# Patient Record
Sex: FEMALE | Race: WHITE | NOT HISPANIC OR LATINO | Employment: OTHER | URBAN - NONMETROPOLITAN AREA
[De-identification: names, ages, dates, MRNs, and addresses within clinical notes are randomized per-mention and may not be internally consistent; named-entity substitution may affect disease eponyms.]

---

## 2021-01-14 ENCOUNTER — IMMUNIZATION (OUTPATIENT)
Dept: VACCINE CLINIC | Facility: HOSPITAL | Age: 81
End: 2021-01-14

## 2021-01-14 PROCEDURE — 91301 HC SARSCO02 VAC 100MCG/0.5ML IM: CPT | Performed by: INTERNAL MEDICINE

## 2021-01-14 PROCEDURE — 0011A: CPT | Performed by: INTERNAL MEDICINE

## 2021-02-09 ENCOUNTER — IMMUNIZATION (OUTPATIENT)
Dept: VACCINE CLINIC | Facility: HOSPITAL | Age: 81
End: 2021-02-09

## 2021-02-09 PROCEDURE — 0012A: CPT | Performed by: INTERNAL MEDICINE

## 2021-02-09 PROCEDURE — 91301 HC SARSCO02 VAC 100MCG/0.5ML IM: CPT | Performed by: INTERNAL MEDICINE

## 2021-06-30 ENCOUNTER — HOSPITAL ENCOUNTER (OUTPATIENT)
Dept: GENERAL RADIOLOGY | Facility: HOSPITAL | Age: 81
Discharge: HOME | End: 2021-06-30
Admitting: NURSE PRACTITIONER

## 2021-06-30 ENCOUNTER — OFFICE VISIT (OUTPATIENT)
Dept: INTERNAL MEDICINE | Facility: CLINIC | Age: 81
End: 2021-06-30

## 2021-06-30 VITALS
BODY MASS INDEX: 25.4 KG/M2 | HEIGHT: 62 IN | DIASTOLIC BLOOD PRESSURE: 80 MMHG | SYSTOLIC BLOOD PRESSURE: 132 MMHG | WEIGHT: 138 LBS | OXYGEN SATURATION: 98 % | TEMPERATURE: 97.8 F | HEART RATE: 78 BPM

## 2021-06-30 DIAGNOSIS — Z76.89 ENCOUNTER TO ESTABLISH CARE: Primary | ICD-10-CM

## 2021-06-30 DIAGNOSIS — R53.83 FATIGUE, UNSPECIFIED TYPE: ICD-10-CM

## 2021-06-30 DIAGNOSIS — E78.5 DYSLIPIDEMIA: ICD-10-CM

## 2021-06-30 DIAGNOSIS — R06.02 SOB (SHORTNESS OF BREATH): ICD-10-CM

## 2021-06-30 DIAGNOSIS — Z13.29 SCREENING FOR ENDOCRINE, METABOLIC AND IMMUNITY DISORDER: ICD-10-CM

## 2021-06-30 DIAGNOSIS — Z13.0 SCREENING FOR DISORDER OF BLOOD AND BLOOD-FORMING ORGANS: ICD-10-CM

## 2021-06-30 DIAGNOSIS — R06.2 WHEEZING: ICD-10-CM

## 2021-06-30 DIAGNOSIS — M10.9 GOUT OF RIGHT KNEE, UNSPECIFIED CAUSE, UNSPECIFIED CHRONICITY: ICD-10-CM

## 2021-06-30 DIAGNOSIS — Z13.228 SCREENING FOR ENDOCRINE, METABOLIC AND IMMUNITY DISORDER: ICD-10-CM

## 2021-06-30 DIAGNOSIS — Z13.0 SCREENING FOR ENDOCRINE, METABOLIC AND IMMUNITY DISORDER: ICD-10-CM

## 2021-06-30 PROCEDURE — 99203 OFFICE O/P NEW LOW 30 MIN: CPT | Performed by: NURSE PRACTITIONER

## 2021-06-30 PROCEDURE — 71046 X-RAY EXAM CHEST 2 VIEWS: CPT

## 2021-06-30 RX ORDER — CYCLOSPORINE 0.5 MG/ML
EMULSION OPHTHALMIC
COMMUNITY
End: 2022-10-17

## 2021-06-30 RX ORDER — TRIAMTERENE AND HYDROCHLOROTHIAZIDE 37.5; 25 MG/1; MG/1
TABLET ORAL
COMMUNITY
End: 2021-09-17 | Stop reason: SDUPTHER

## 2021-06-30 RX ORDER — OMEPRAZOLE 20 MG/1
20 CAPSULE, DELAYED RELEASE ORAL DAILY
COMMUNITY

## 2021-07-01 LAB
ALBUMIN SERPL-MCNC: 4.6 G/DL (ref 3.5–5.2)
ALBUMIN/GLOB SERPL: 1.8 G/DL
ALP SERPL-CCNC: 55 U/L (ref 39–117)
ALT SERPL-CCNC: 9 U/L (ref 1–33)
AST SERPL-CCNC: 13 U/L (ref 1–32)
BASOPHILS # BLD AUTO: 0.06 10*3/MM3 (ref 0–0.2)
BASOPHILS NFR BLD AUTO: 0.7 % (ref 0–1.5)
BILIRUB SERPL-MCNC: 0.6 MG/DL (ref 0–1.2)
BUN SERPL-MCNC: 13 MG/DL (ref 8–23)
BUN/CREAT SERPL: 17.8 (ref 7–25)
CALCIUM SERPL-MCNC: 10 MG/DL (ref 8.6–10.5)
CHLORIDE SERPL-SCNC: 97 MMOL/L (ref 98–107)
CHOLEST SERPL-MCNC: 254 MG/DL (ref 0–200)
CO2 SERPL-SCNC: 26.2 MMOL/L (ref 22–29)
CREAT SERPL-MCNC: 0.73 MG/DL (ref 0.57–1)
EOSINOPHIL # BLD AUTO: 0.12 10*3/MM3 (ref 0–0.4)
EOSINOPHIL NFR BLD AUTO: 1.3 % (ref 0.3–6.2)
ERYTHROCYTE [DISTWIDTH] IN BLOOD BY AUTOMATED COUNT: 13.9 % (ref 12.3–15.4)
GLOBULIN SER CALC-MCNC: 2.6 GM/DL
GLUCOSE SERPL-MCNC: 100 MG/DL (ref 65–99)
HCT VFR BLD AUTO: 41.4 % (ref 34–46.6)
HDLC SERPL-MCNC: 67 MG/DL (ref 40–60)
HGB BLD-MCNC: 13.7 G/DL (ref 12–15.9)
IMM GRANULOCYTES # BLD AUTO: 0.12 10*3/MM3 (ref 0–0.05)
IMM GRANULOCYTES NFR BLD AUTO: 1.3 % (ref 0–0.5)
LDLC SERPL CALC-MCNC: 155 MG/DL (ref 0–100)
LYMPHOCYTES # BLD AUTO: 1.72 10*3/MM3 (ref 0.7–3.1)
LYMPHOCYTES NFR BLD AUTO: 18.7 % (ref 19.6–45.3)
MCH RBC QN AUTO: 28.7 PG (ref 26.6–33)
MCHC RBC AUTO-ENTMCNC: 33.1 G/DL (ref 31.5–35.7)
MCV RBC AUTO: 86.6 FL (ref 79–97)
MONOCYTES # BLD AUTO: 0.57 10*3/MM3 (ref 0.1–0.9)
MONOCYTES NFR BLD AUTO: 6.2 % (ref 5–12)
NEUTROPHILS # BLD AUTO: 6.62 10*3/MM3 (ref 1.7–7)
NEUTROPHILS NFR BLD AUTO: 71.8 % (ref 42.7–76)
NRBC BLD AUTO-RTO: 0 /100 WBC (ref 0–0.2)
PLATELET # BLD AUTO: 375 10*3/MM3 (ref 140–450)
POTASSIUM SERPL-SCNC: 4.1 MMOL/L (ref 3.5–5.2)
PROT SERPL-MCNC: 7.2 G/DL (ref 6–8.5)
RBC # BLD AUTO: 4.78 10*6/MM3 (ref 3.77–5.28)
SODIUM SERPL-SCNC: 137 MMOL/L (ref 136–145)
TRIGL SERPL-MCNC: 180 MG/DL (ref 0–150)
TSH SERPL DL<=0.005 MIU/L-ACNC: 3.15 UIU/ML (ref 0.27–4.2)
URATE SERPL-MCNC: 4.4 MG/DL (ref 2.4–5.7)
VLDLC SERPL CALC-MCNC: 32 MG/DL (ref 5–40)
WBC # BLD AUTO: 9.21 10*3/MM3 (ref 3.4–10.8)

## 2021-08-02 ENCOUNTER — TELEPHONE (OUTPATIENT)
Dept: INTERNAL MEDICINE | Facility: CLINIC | Age: 81
End: 2021-08-02

## 2021-08-02 NOTE — TELEPHONE ENCOUNTER
Caller: Jose Nicole    Relationship: Self    Best call back number: [phone]    What form or medical record are you requesting: RECORDS     Who is requesting this form or medical record from you: N/A     How would you like to receive the form or medical records (pick-up, mail, fax): N/A   If fax, what is the fax number:   If mail, what is the address:   If pick-up, provide patient with address and location details    Timeframe paperwork needed: N/A     Additional notes: PATIENT IS REQUESTING THE OFFICE REQUEST HER MEDICAL RECORDS FROM HER PREVIOUS PROVIDER. SHE STATES THAT SHE WAS ADVISED SHE COULD CALL WITH HER PREVIOUS PROVIDERS NAME AND THE OFFICE COULD REQUEST THIS INFORMATION (PREVIOUS PROVIDER IS MIRZA CARVALHO) WITHOUT HER COMING INTO THE OFFICE.

## 2021-09-17 RX ORDER — TRIAMTERENE AND HYDROCHLOROTHIAZIDE 37.5; 25 MG/1; MG/1
1 TABLET ORAL DAILY
Qty: 90 TABLET | Refills: 2 | Status: SHIPPED | OUTPATIENT
Start: 2021-09-17 | End: 2022-08-12

## 2021-09-17 NOTE — TELEPHONE ENCOUNTER
Caller: Nicole Garcia    Relationship: Self    Best call back number: [phone]  Medication needed:  triamterene-hydrochlorothiazide     When do you need the refill by: ASAP    What additional details did the patient provide when requesting the medication: N/A    Does the patient have less than a 3 day supply:  [x] Yes  [] No    What is the patient's preferred pharmacy: [facility] [address] [phone] [facility] [phone]

## 2021-10-11 ENCOUNTER — OFFICE VISIT (OUTPATIENT)
Dept: INTERNAL MEDICINE | Facility: CLINIC | Age: 81
End: 2021-10-11

## 2021-10-11 VITALS
SYSTOLIC BLOOD PRESSURE: 112 MMHG | DIASTOLIC BLOOD PRESSURE: 68 MMHG | HEIGHT: 62 IN | WEIGHT: 138 LBS | TEMPERATURE: 97.3 F | BODY MASS INDEX: 25.4 KG/M2 | HEART RATE: 73 BPM | OXYGEN SATURATION: 96 %

## 2021-10-11 DIAGNOSIS — E78.5 DYSLIPIDEMIA: ICD-10-CM

## 2021-10-11 DIAGNOSIS — E66.3 OVERWEIGHT WITH BODY MASS INDEX (BMI) OF 25 TO 25.9 IN ADULT: ICD-10-CM

## 2021-10-11 DIAGNOSIS — Z78.0 POSTMENOPAUSAL: ICD-10-CM

## 2021-10-11 DIAGNOSIS — Z00.00 ENCOUNTER FOR SUBSEQUENT ANNUAL WELLNESS VISIT (AWV) IN MEDICARE PATIENT: Primary | ICD-10-CM

## 2021-10-11 DIAGNOSIS — Z13.228 SCREENING FOR ENDOCRINE, METABOLIC AND IMMUNITY DISORDER: ICD-10-CM

## 2021-10-11 DIAGNOSIS — Z13.0 SCREENING FOR ENDOCRINE, METABOLIC AND IMMUNITY DISORDER: ICD-10-CM

## 2021-10-11 DIAGNOSIS — I10 PRIMARY HYPERTENSION: ICD-10-CM

## 2021-10-11 DIAGNOSIS — Z13.0 SCREENING FOR DISORDER OF BLOOD AND BLOOD-FORMING ORGANS: ICD-10-CM

## 2021-10-11 DIAGNOSIS — K21.9 GASTROESOPHAGEAL REFLUX DISEASE, UNSPECIFIED WHETHER ESOPHAGITIS PRESENT: ICD-10-CM

## 2021-10-11 DIAGNOSIS — Z13.29 SCREENING FOR ENDOCRINE, METABOLIC AND IMMUNITY DISORDER: ICD-10-CM

## 2021-10-11 PROCEDURE — 1170F FXNL STATUS ASSESSED: CPT | Performed by: NURSE PRACTITIONER

## 2021-10-11 PROCEDURE — 1159F MED LIST DOCD IN RCRD: CPT | Performed by: NURSE PRACTITIONER

## 2021-10-11 PROCEDURE — G0439 PPPS, SUBSEQ VISIT: HCPCS | Performed by: NURSE PRACTITIONER

## 2021-10-11 NOTE — PROGRESS NOTES
The ABCs of the Annual Wellness Visit  Subsequent Medicare Wellness Visit    Chief Complaint   Patient presents with   • Medicare Wellness-subsequent      Subjective    History of Present Illness:  Nicole Garcia is a 81 y.o. female who presents for a Subsequent Medicare Wellness Visit.    The following portions of the patient's history were reviewed and   updated as appropriate: allergies, current medications, past family history, past medical history, past social history, past surgical history and problem list.    Compared to one year ago, the patient feels her physical   health is the same.    Compared to one year ago, the patient feels her mental   health is the same.    Recent Hospitalizations:  She was not admitted to the hospital during the last year.       Current Medical Providers:  Patient Care Team:  Zaida Marrero APRN as PCP - General (Family Medicine)    Outpatient Medications Prior to Visit   Medication Sig Dispense Refill   • cycloSPORINE (Restasis) 0.05 % ophthalmic emulsion Restasis 0.05 % eye drops in a dropperette   PLACE ONE DROP IN EACH IN THE AFFECTED EYE TWICE DAILY     • omeprazole (priLOSEC) 20 MG capsule Take 20 mg by mouth Daily.     • triamterene-hydrochlorothiazide (MAXZIDE-25) 37.5-25 MG per tablet Take 1 tablet by mouth Daily. 90 tablet 2     No facility-administered medications prior to visit.       No opioid medication identified on active medication list. I have reviewed chart for other potential  high risk medication/s and harmful drug interactions in the elderly.          Aspirin is not on active medication list.  Aspirin use is not indicated based on review of current medical condition/s. Risk of harm outweighs potential benefits.  .    Patient Active Problem List   Diagnosis   • Allergic rhinitis due to pollen   • Bilateral sensorineural hearing loss   • Impacted cerumen   • Otitis externa of left ear   • Hypertension     Advance Care Planning  Advance Directive is not on  "file.  ACP discussion was held with the patient during this visit. Patient does not have an advance directive, information provided.    Review of Systems   All other systems reviewed and are negative.       Objective    Vitals:    10/11/21 1320   BP: 112/68   Pulse: 73   Temp: 97.3 °F (36.3 °C)   TempSrc: Infrared   SpO2: 96%   Weight: 62.6 kg (138 lb)   Height: 157.5 cm (62\")   PainSc: 0-No pain     BMI Readings from Last 1 Encounters:   10/11/21 25.24 kg/m²   BMI is above normal parameters. Recommendations include: exercise counseling and nutrition counseling    Does the patient have evidence of cognitive impairment? No    Physical Exam  Constitutional:       Appearance: She is well-developed. She is not ill-appearing.   HENT:      Head: Normocephalic.      Right Ear: Tympanic membrane, ear canal and external ear normal. Decreased hearing noted.      Left Ear: Tympanic membrane, ear canal and external ear normal. Decreased hearing noted.      Ears:      Comments: Bilateral hearing aids removed for exam     Nose: Nose normal.      Mouth/Throat:      Mouth: Mucous membranes are moist.      Pharynx: Oropharynx is clear. Uvula midline.   Eyes:      Extraocular Movements: Extraocular movements intact.      Conjunctiva/sclera: Conjunctivae normal.      Pupils: Pupils are equal, round, and reactive to light.   Neck:      Thyroid: No thyromegaly.      Vascular: No carotid bruit.   Cardiovascular:      Rate and Rhythm: Normal rate and regular rhythm.      Pulses: Normal pulses.      Heart sounds: Normal heart sounds.   Pulmonary:      Effort: Pulmonary effort is normal.      Breath sounds: Normal breath sounds.   Abdominal:      General: Bowel sounds are normal.      Palpations: Abdomen is soft.      Tenderness: There is no abdominal tenderness.   Musculoskeletal:         General: No tenderness or deformity. Normal range of motion.      Cervical back: Full passive range of motion without pain, normal range of motion and " neck supple.   Lymphadenopathy:      Cervical: No cervical adenopathy.   Skin:     General: Skin is warm.      Capillary Refill: Capillary refill takes less than 2 seconds.   Neurological:      Mental Status: She is alert and oriented to person, place, and time.      Sensory: No sensory deficit.      Coordination: Coordination normal.      Gait: Gait normal.      Comments: CN II-XII normal   Psychiatric:         Attention and Perception: Attention normal.         Mood and Affect: Mood and affect normal.         Speech: Speech normal.         Behavior: Behavior normal.         Thought Content: Thought content normal.              HEALTH RISK ASSESSMENT    Smoking Status:  Social History     Tobacco Use   Smoking Status Never Smoker   Smokeless Tobacco Never Used     Alcohol Consumption:  Social History     Substance and Sexual Activity   Alcohol Use Never     Fall Risk Screen:    [facility] Fall Risk Assessment was completed, and patient is at LOW risk for falls.Assessment completed on:10/11/2021    Depression Screening:  PHQ-2/PHQ-9 Depression Screening 10/11/2021   Little interest or pleasure in doing things 0   Feeling down, depressed, or hopeless 0   Total Score 0       Health Habits and Functional and Cognitive Screening:  Functional & Cognitive Status 10/11/2021   Do you have difficulty preparing food and eating? No   Do you have difficulty bathing yourself, getting dressed or grooming yourself? No   Do you have difficulty using the toilet? No   Do you have difficulty moving around from place to place? No   Do you have trouble with steps or getting out of a bed or a chair? No   Current Diet Well Balanced Diet   Dental Exam Up to date   Eye Exam Up to date   Exercise (times per week) 0 times per week   Current Exercises Include No Regular Exercise   Do you need help using the phone?  No   Are you deaf or do you have serious difficulty hearing?  Yes   Do you need help with transportation? No   Do you need help  shopping? No   Do you need help preparing meals?  No   Do you need help with housework?  No   Do you need help with laundry? No   Do you need help taking your medications? No   Do you need help managing money? No   Do you ever drive or ride in a car without wearing a seat belt? No   Have you felt unusual stress, anger or loneliness in the last month? No   Who do you live with? Alone   If you need help, do you have trouble finding someone available to you? No   Have you been bothered in the last four weeks by sexual problems? No   Do you have difficulty concentrating, remembering or making decisions? No       Age-appropriate Screening Schedule:  Refer to the list below for future screening recommendations based on patient's age, sex and/or medical conditions. Orders for these recommended tests are listed in the plan section. The patient has been provided with a written plan.    Health Maintenance   Topic Date Due   • DXA SCAN  06/16/2019   • TDAP/TD VACCINES (1 - Tdap) 10/15/2021 (Originally 8/2/1959)   • ZOSTER VACCINE (1 of 2) 10/15/2021 (Originally 8/2/1990)   • INFLUENZA VACCINE  Completed              Assessment/Plan   CMS Preventative Services Quick Reference  Risk Factors Identified During Encounter  Cardiovascular Disease  Depression/Dysphoria  Glaucoma or Family History of Glaucoma  Hearing Problem  Immunizations Discussed/Encouraged (specific Immunizations; Tdap and Shingrix  Inadequate Social Support, Isolation, Loneliness, Lack of Transportation, Financial Difficulties, or Caregiver Stress   Obesity/Overweight   The above risks/problems have been discussed with the patient.  Follow up actions/plans if indicated are seen below in the Assessment/Plan Section.  Pertinent information has been shared with the patient in the After Visit Summary.    Diagnoses and all orders for this visit:    1. Encounter for subsequent annual wellness visit (AWV) in Medicare patient (Primary)    2. Primary hypertension         - Follow heart healthy diet.  Advised to reduce daily sodium intake to < 1500 mg per day.  Avoid processed & fast foods.        - Monitor blood pressure as discussed, keep log and bring to next appointment.          - Exercise as tolerated, with a goal of 30 minutes of moderate exercise most days.         - Take medications as prescribed.  3. Dyslipidemia  -     Lipid Panel  - Heart healthy diet    4. Gastroesophageal reflux disease, unspecified whether esophagitis present        - Avoid triggers such as spicy or greasy food, alcohol, chocolate, caffeine, carbonated beverages, tomatoes and tomato sauces, onions, smoking        - May raise HOB 30 degrees with risers or blocks, if desired        - Do not eat within 2-3 hours of lying down        - Avoid clothing, belts that constrict midsection        - Losing weight may reduce symptoms        - Continue taking prilosec daily     5. Postmenopausal  -     dexa bone density axial; Future    6. Screening for disorder of blood and blood-forming organs  -     CBC & Differential    7. Screening for endocrine, metabolic and immunity disorder  -     Comprehensive Metabolic Panel    8. Overweight with BMI of 25.0-25.9         - Continue heart healthy diet, be physically active daily as tolerated    Follow Up:   Return in about 1 year (around 10/11/2022) for Medicare Wellness.     An After Visit Summary and PPPS were made available to the patient.

## 2021-10-12 LAB
ALBUMIN SERPL-MCNC: 5 G/DL (ref 3.5–5.2)
ALBUMIN/GLOB SERPL: 2 G/DL
ALP SERPL-CCNC: 51 U/L (ref 39–117)
ALT SERPL-CCNC: 11 U/L (ref 1–33)
AST SERPL-CCNC: 18 U/L (ref 1–32)
BASOPHILS # BLD AUTO: 0.05 10*3/MM3 (ref 0–0.2)
BASOPHILS NFR BLD AUTO: 0.6 % (ref 0–1.5)
BILIRUB SERPL-MCNC: 0.5 MG/DL (ref 0–1.2)
BUN SERPL-MCNC: 10 MG/DL (ref 8–23)
BUN/CREAT SERPL: 12.8 (ref 7–25)
CALCIUM SERPL-MCNC: 10.4 MG/DL (ref 8.6–10.5)
CHLORIDE SERPL-SCNC: 98 MMOL/L (ref 98–107)
CHOLEST SERPL-MCNC: 282 MG/DL (ref 0–200)
CO2 SERPL-SCNC: 26.8 MMOL/L (ref 22–29)
CREAT SERPL-MCNC: 0.78 MG/DL (ref 0.57–1)
EOSINOPHIL # BLD AUTO: 0.18 10*3/MM3 (ref 0–0.4)
EOSINOPHIL NFR BLD AUTO: 2 % (ref 0.3–6.2)
ERYTHROCYTE [DISTWIDTH] IN BLOOD BY AUTOMATED COUNT: 14 % (ref 12.3–15.4)
GLOBULIN SER CALC-MCNC: 2.5 GM/DL
GLUCOSE SERPL-MCNC: 95 MG/DL (ref 65–99)
HCT VFR BLD AUTO: 43.4 % (ref 34–46.6)
HDLC SERPL-MCNC: 65 MG/DL (ref 40–60)
HGB BLD-MCNC: 13.6 G/DL (ref 12–15.9)
IMM GRANULOCYTES # BLD AUTO: 0.03 10*3/MM3 (ref 0–0.05)
IMM GRANULOCYTES NFR BLD AUTO: 0.3 % (ref 0–0.5)
LDLC SERPL CALC-MCNC: 176 MG/DL (ref 0–100)
LYMPHOCYTES # BLD AUTO: 1.6 10*3/MM3 (ref 0.7–3.1)
LYMPHOCYTES NFR BLD AUTO: 18 % (ref 19.6–45.3)
MCH RBC QN AUTO: 27.8 PG (ref 26.6–33)
MCHC RBC AUTO-ENTMCNC: 31.3 G/DL (ref 31.5–35.7)
MCV RBC AUTO: 88.6 FL (ref 79–97)
MONOCYTES # BLD AUTO: 0.61 10*3/MM3 (ref 0.1–0.9)
MONOCYTES NFR BLD AUTO: 6.9 % (ref 5–12)
NEUTROPHILS # BLD AUTO: 6.42 10*3/MM3 (ref 1.7–7)
NEUTROPHILS NFR BLD AUTO: 72.2 % (ref 42.7–76)
NRBC BLD AUTO-RTO: 0 /100 WBC (ref 0–0.2)
PLATELET # BLD AUTO: 317 10*3/MM3 (ref 140–450)
POTASSIUM SERPL-SCNC: 4 MMOL/L (ref 3.5–5.2)
PROT SERPL-MCNC: 7.5 G/DL (ref 6–8.5)
RBC # BLD AUTO: 4.9 10*6/MM3 (ref 3.77–5.28)
SODIUM SERPL-SCNC: 141 MMOL/L (ref 136–145)
TRIGL SERPL-MCNC: 219 MG/DL (ref 0–150)
VLDLC SERPL CALC-MCNC: 41 MG/DL (ref 5–40)
WBC # BLD AUTO: 8.89 10*3/MM3 (ref 3.4–10.8)

## 2022-08-12 RX ORDER — TRIAMTERENE AND HYDROCHLOROTHIAZIDE 37.5; 25 MG/1; MG/1
TABLET ORAL
Qty: 90 TABLET | Refills: 2 | Status: SHIPPED | OUTPATIENT
Start: 2022-08-12

## 2022-10-17 ENCOUNTER — OFFICE VISIT (OUTPATIENT)
Dept: INTERNAL MEDICINE | Facility: CLINIC | Age: 82
End: 2022-10-17

## 2022-10-17 ENCOUNTER — HOSPITAL ENCOUNTER (OUTPATIENT)
Dept: GENERAL RADIOLOGY | Facility: HOSPITAL | Age: 82
Discharge: HOME | End: 2022-10-17
Admitting: NURSE PRACTITIONER

## 2022-10-17 VITALS
DIASTOLIC BLOOD PRESSURE: 78 MMHG | BODY MASS INDEX: 25.58 KG/M2 | HEART RATE: 81 BPM | HEIGHT: 62 IN | OXYGEN SATURATION: 96 % | TEMPERATURE: 97.3 F | WEIGHT: 139 LBS | SYSTOLIC BLOOD PRESSURE: 126 MMHG

## 2022-10-17 DIAGNOSIS — K21.9 GASTROESOPHAGEAL REFLUX DISEASE, UNSPECIFIED WHETHER ESOPHAGITIS PRESENT: ICD-10-CM

## 2022-10-17 DIAGNOSIS — Z91.81 AT LOW RISK FOR FALL: ICD-10-CM

## 2022-10-17 DIAGNOSIS — Z13.220 SCREENING FOR LIPID DISORDERS: ICD-10-CM

## 2022-10-17 DIAGNOSIS — R82.90 ABNORMAL URINE ODOR: ICD-10-CM

## 2022-10-17 DIAGNOSIS — Z13.0 SCREENING FOR DISORDER OF BLOOD AND BLOOD-FORMING ORGANS: ICD-10-CM

## 2022-10-17 DIAGNOSIS — M54.50 CHRONIC MIDLINE LOW BACK PAIN WITHOUT SCIATICA: ICD-10-CM

## 2022-10-17 DIAGNOSIS — I10 PRIMARY HYPERTENSION: ICD-10-CM

## 2022-10-17 DIAGNOSIS — Z13.0 SCREENING FOR ENDOCRINE, METABOLIC AND IMMUNITY DISORDER: ICD-10-CM

## 2022-10-17 DIAGNOSIS — Z13.228 SCREENING FOR ENDOCRINE, METABOLIC AND IMMUNITY DISORDER: ICD-10-CM

## 2022-10-17 DIAGNOSIS — E73.9 LACTOSE INTOLERANCE: ICD-10-CM

## 2022-10-17 DIAGNOSIS — R73.09 OTHER ABNORMAL GLUCOSE: ICD-10-CM

## 2022-10-17 DIAGNOSIS — M54.6 CHRONIC MIDLINE THORACIC BACK PAIN: ICD-10-CM

## 2022-10-17 DIAGNOSIS — E78.5 DYSLIPIDEMIA: ICD-10-CM

## 2022-10-17 DIAGNOSIS — Z78.0 POSTMENOPAUSAL: ICD-10-CM

## 2022-10-17 DIAGNOSIS — Z00.00 ANNUAL PHYSICAL EXAM: ICD-10-CM

## 2022-10-17 DIAGNOSIS — G89.29 CHRONIC MIDLINE LOW BACK PAIN WITHOUT SCIATICA: ICD-10-CM

## 2022-10-17 DIAGNOSIS — Z13.29 SCREENING FOR ENDOCRINE, METABOLIC AND IMMUNITY DISORDER: ICD-10-CM

## 2022-10-17 DIAGNOSIS — R53.83 OTHER FATIGUE: ICD-10-CM

## 2022-10-17 DIAGNOSIS — Z86.2 HISTORY OF ANEMIA: ICD-10-CM

## 2022-10-17 DIAGNOSIS — G89.29 CHRONIC MIDLINE THORACIC BACK PAIN: ICD-10-CM

## 2022-10-17 DIAGNOSIS — Z23 INFLUENZA VACCINE NEEDED: ICD-10-CM

## 2022-10-17 DIAGNOSIS — Z00.00 MEDICARE ANNUAL WELLNESS VISIT, SUBSEQUENT: Primary | ICD-10-CM

## 2022-10-17 LAB
BILIRUB BLD-MCNC: NEGATIVE MG/DL
CLARITY, POC: CLEAR
COLOR UR: YELLOW
EXPIRATION DATE: ABNORMAL
GLUCOSE UR STRIP-MCNC: NEGATIVE MG/DL
KETONES UR QL: NEGATIVE
LEUKOCYTE EST, POC: ABNORMAL
Lab: ABNORMAL
NITRITE UR-MCNC: NEGATIVE MG/ML
PH UR: 6 [PH] (ref 5–8)
PROT UR STRIP-MCNC: NEGATIVE MG/DL
RBC # UR STRIP: NEGATIVE /UL
SP GR UR: 1.03 (ref 1–1.03)
UROBILINOGEN UR QL: NORMAL

## 2022-10-17 PROCEDURE — 72072 X-RAY EXAM THORAC SPINE 3VWS: CPT

## 2022-10-17 PROCEDURE — 99397 PER PM REEVAL EST PAT 65+ YR: CPT | Performed by: NURSE PRACTITIONER

## 2022-10-17 PROCEDURE — 1159F MED LIST DOCD IN RCRD: CPT | Performed by: NURSE PRACTITIONER

## 2022-10-17 PROCEDURE — 90662 IIV NO PRSV INCREASED AG IM: CPT | Performed by: NURSE PRACTITIONER

## 2022-10-17 PROCEDURE — G0008 ADMIN INFLUENZA VIRUS VAC: HCPCS | Performed by: NURSE PRACTITIONER

## 2022-10-17 PROCEDURE — G0439 PPPS, SUBSEQ VISIT: HCPCS | Performed by: NURSE PRACTITIONER

## 2022-10-17 PROCEDURE — 81003 URINALYSIS AUTO W/O SCOPE: CPT | Performed by: NURSE PRACTITIONER

## 2022-10-17 PROCEDURE — 1170F FXNL STATUS ASSESSED: CPT | Performed by: NURSE PRACTITIONER

## 2022-10-17 RX ORDER — LACTASE 3000 UNIT
3000 TABLET ORAL
Qty: 90 TABLET | Refills: 11 | Status: SHIPPED | OUTPATIENT
Start: 2022-10-17

## 2022-10-17 RX ORDER — LIFITEGRAST 50 MG/ML
SOLUTION/ DROPS OPHTHALMIC
COMMUNITY

## 2022-10-17 NOTE — PROGRESS NOTES
The ABCs of the Annual Wellness Visit  Subsequent Medicare Wellness Visit    Chief Complaint   Patient presents with   • Medicare Wellness-subsequent      Subjective    History of Present Illness:  Nicole Garcia is a 82 y.o. female who presents for a Subsequent Medicare Wellness Visit.    Feels tired all the time, easily fatigued.  Has noted swelling on the right side of her neck.  No stamina.      Lumbar back pain that has been present for years. Increasing in intensity and frequency.  She has begun to have pain in her mid back.  Does not recall recent or remote injury.  Walking increases pain, interferes with her ADL's.  Has taken acetaminophen, not effective.      Lactose intolerant. Not currently taking calcium or vitamin d supplement.       Hearing loss has worsened, required change in hearing aid settings.  Still feels she does not hear from her left ear well.      Has noticed her urine smells strong. Stays well hydrated, drinks plenty of water daily.  No dysuria, difficulty initiating her urine stream, hematuria, increased urinary frequency or urgency.      The following portions of the patient's history were reviewed and   updated as appropriate: allergies, current medications, past family history, past medical history, past social history, past surgical history and problem list.    Compared to one year ago, the patient feels her physical   health is the same.    Compared to one year ago, the patient feels her mental   health is the same.    Recent Hospitalizations:  She was not admitted to the hospital during the last year.       Current Medical Providers:  Patient Care Team:  Zaida Marrero APRN as PCP - General (Family Medicine)    Outpatient Medications Prior to Visit   Medication Sig Dispense Refill   • Lifitegrast (Xiidra) 5 % ophthalmic solution Xiidra 5 % eye drops in a dropperette     • omeprazole (priLOSEC) 20 MG capsule Take 20 mg by mouth Daily.     • triamterene-hydrochlorothiazide  "(MAXZIDE-25) 37.5-25 MG per tablet TAKE ONE TABLET BY MOUTH EVERY DAY 90 tablet 2   • cycloSPORINE (Restasis) 0.05 % ophthalmic emulsion Restasis 0.05 % eye drops in a dropperette   PLACE ONE DROP IN EACH IN THE AFFECTED EYE TWICE DAILY       No facility-administered medications prior to visit.       No opioid medication identified on active medication list. I have reviewed chart for other potential  high risk medication/s and harmful drug interactions in the elderly.          Aspirin is not on active medication list.  Aspirin use is not indicated based on review of current medical condition/s. Risk of harm outweighs potential benefits.  .    Patient Active Problem List   Diagnosis   • Allergic rhinitis due to pollen   • Bilateral sensorineural hearing loss   • Impacted cerumen   • Otitis externa of left ear   • Hypertension     Advance Care Planning  Advance Directive is not on file.  ACP discussion was held with the patient during this visit. Patient has an advance directive (not in EMR), copy requested.    Review of Systems   All other systems reviewed and are negative.       Objective    Vitals:    10/17/22 1303   BP: 126/78   Pulse: 81   Temp: 97.3 °F (36.3 °C)   SpO2: 96%   Weight: 63 kg (139 lb)   Height: 157.5 cm (62.01\")   PainSc: 0-No pain     Estimated body mass index is 25.42 kg/m² as calculated from the following:    Height as of this encounter: 157.5 cm (62.01\").    Weight as of this encounter: 63 kg (139 lb).    BMI is >= 25 and <30. (Overweight) The following options were offered after discussion;: exercise counseling/recommendations and nutrition counseling/recommendations      Does the patient have evidence of cognitive impairment? No    Physical Exam  Constitutional:       Appearance: She is well-developed. She is not ill-appearing.   HENT:      Head: Normocephalic.      Right Ear: Tympanic membrane, ear canal and external ear normal.      Left Ear: Tympanic membrane, ear canal and external ear " normal.      Nose: Nose normal.      Mouth/Throat:      Mouth: Mucous membranes are moist.      Pharynx: Oropharynx is clear. Uvula midline.   Eyes:      Extraocular Movements: Extraocular movements intact.      Conjunctiva/sclera: Conjunctivae normal.      Pupils: Pupils are equal, round, and reactive to light.   Neck:      Thyroid: No thyromegaly.      Vascular: No carotid bruit.   Cardiovascular:      Rate and Rhythm: Normal rate and regular rhythm.      Pulses:           Radial pulses are 2+ on the right side and 2+ on the left side.        Dorsalis pedis pulses are 2+ on the right side and 2+ on the left side.      Heart sounds: Normal heart sounds.   Pulmonary:      Effort: Pulmonary effort is normal.      Breath sounds: Normal breath sounds.   Abdominal:      General: Bowel sounds are normal.      Palpations: Abdomen is soft.      Tenderness: There is no abdominal tenderness.   Musculoskeletal:         General: No tenderness or deformity. Normal range of motion.      Cervical back: Full passive range of motion without pain, normal range of motion and neck supple.      Right lower leg: No edema.      Left lower leg: No edema.   Lymphadenopathy:      Cervical: No cervical adenopathy.   Skin:     General: Skin is warm.      Capillary Refill: Capillary refill takes less than 2 seconds.   Neurological:      Mental Status: She is alert and oriented to person, place, and time.      Sensory: No sensory deficit.      Coordination: Coordination normal.      Gait: Gait normal.      Comments: CN II-XII normal   Psychiatric:         Attention and Perception: Attention normal.         Mood and Affect: Mood and affect normal.         Speech: Speech normal.         Behavior: Behavior normal.         Thought Content: Thought content normal.                 HEALTH RISK ASSESSMENT    Smoking Status:  Social History     Tobacco Use   Smoking Status Never   Smokeless Tobacco Never     Alcohol Consumption:  Social History      Substance and Sexual Activity   Alcohol Use Never     Fall Risk Screen:    [facility] Fall Risk Assessment was completed, and patient is at MODERATE risk for falls. Assessment completed on:10/17/2022    Depression Screening:  PHQ-2/PHQ-9 Depression Screening 10/17/2022   Retired PHQ-9 Total Score -   Retired Total Score -   Little Interest or Pleasure in Doing Things 0-->not at all   Feeling Down, Depressed or Hopeless 0-->not at all   PHQ-9: Brief Depression Severity Measure Score 0       Health Habits and Functional and Cognitive Screening:  Functional & Cognitive Status 10/17/2022   Do you have difficulty preparing food and eating? No   Do you have difficulty bathing yourself, getting dressed or grooming yourself? No   Do you have difficulty using the toilet? No   Do you have difficulty moving around from place to place? No   Do you have trouble with steps or getting out of a bed or a chair? Yes   Current Diet Well Balanced Diet   Dental Exam Up to date   Eye Exam Up to date   Exercise (times per week) 0 times per week   Current Exercises Include No Regular Exercise   Do you need help using the phone?  No   Are you deaf or do you have serious difficulty hearing?  Yes   Do you need help with transportation? No   Do you need help shopping? No   Do you need help preparing meals?  No   Do you need help with housework?  No   Do you need help with laundry? No   Do you need help taking your medications? No   Do you need help managing money? No   Do you ever drive or ride in a car without wearing a seat belt? No   Have you felt unusual stress, anger or loneliness in the last month? No   Who do you live with? Alone   If you need help, do you have trouble finding someone available to you? No   Have you been bothered in the last four weeks by sexual problems? No   Do you have difficulty concentrating, remembering or making decisions? No       Age-appropriate Screening Schedule:  Refer to the list below for future screening  recommendations based on patient's age, sex and/or medical conditions. Orders for these recommended tests are listed in the plan section. The patient has been provided with a written plan.    Health Maintenance   Topic Date Due   • TDAP/TD VACCINES (1 - Tdap) Never done   • ZOSTER VACCINE (1 of 2) Never done   • DXA SCAN  06/16/2019   • INFLUENZA VACCINE  Completed              Assessment & Plan   CMS Preventative Services Quick Reference  Risk Factors Identified During Encounter  Chronic Pain   Hearing Problem  Immunizations Discussed/Encouraged (specific Immunizations; Influenza  Obesity/Overweight   The above risks/problems have been discussed with the patient.  Follow up actions/plans if indicated are seen below in the Assessment/Plan Section.  Pertinent information has been shared with the patient in the After Visit Summary.    Diagnoses and all orders for this visit:    1. Medicare annual wellness visit, subsequent (Primary)    2. Annual physical exam    3. Primary hypertension    4. Dyslipidemia  -     Lipid Panel    5. Other fatigue  -     CBC & Differential  -     Comprehensive Metabolic Panel  -     TSH  -     Thyroid Peroxidase Antibody  -     T4, Free  -     Iron  -     Vitamin B12 and Folate  -     Hemoglobin A1c    6. History of anemia  -     CBC & Differential  -     Iron  -     Vitamin B12 and Folate    7. Lactose intolerance  -     lactase (Lactaid) 3000 units tablet; Take 1 tablet by mouth 3 (Three) Times a Day With Meals.  Dispense: 90 tablet; Refill: 11  -     calcium-vitamin D (Oscal 500/200 D-3) 500-200 MG-UNIT per tablet; Take 1 tablet by mouth Daily.  Dispense: 90 tablet; Refill: 3    8. Gastroesophageal reflux disease, unspecified whether esophagitis present    9. Chronic midline low back pain without sciatica  -     Ambulatory Referral to Physical Therapy Evaluate and treat; Heat; Moist heat, Ultrasound; Cross Fiber, Soft Tissue Mobilizaton; Stretching, Strengthening    10. Chronic midline  thoracic back pain  -     Ambulatory Referral to Physical Therapy Evaluate and treat; Heat; Moist heat, Ultrasound; Cross Fiber, Soft Tissue Mobilizaton; Stretching, Strengthening  -     XR Spine Thoracic 3 View    11. Postmenopausal  -     calcium-vitamin D (Oscal 500/200 D-3) 500-200 MG-UNIT per tablet; Take 1 tablet by mouth Daily.  Dispense: 90 tablet; Refill: 3  -     XR Spine Thoracic 3 View    12. Screening for lipid disorders    13. Screening for endocrine, metabolic and immunity disorder  -     Comprehensive Metabolic Panel  -     TSH  -     Thyroid Peroxidase Antibody  -     T4, Free    14. Screening for disorder of blood and blood-forming organs  -     CBC & Differential  -     Iron  -     Vitamin B12 and Folate    15. Other abnormal glucose  -     Hemoglobin A1c    16. Abnormal urine odor  -     Cancel: Urinalysis With Microscopic - Urine, Clean Catch  -     Urine Culture - , Urine, Clean Catch  -     POCT urinalysis dipstick, automated    17. Influenza vaccine needed  -     Fluzone High-Dose 65+yrs ([id number])    18. At low risk for fall          Follow Up:   Return in 1 year (on 10/17/2023).     An After Visit Summary and PPPS were made available to the patient.

## 2022-10-18 RX ORDER — BACLOFEN 10 MG/1
10 TABLET ORAL 2 TIMES DAILY PRN
Qty: 30 TABLET | Refills: 0 | Status: SHIPPED | OUTPATIENT
Start: 2022-10-18 | End: 2022-11-02

## 2022-10-19 LAB
BACTERIA UR CULT: NORMAL
BACTERIA UR CULT: NORMAL

## 2022-10-30 NOTE — PATIENT INSTRUCTIONS
Fall Prevention in the Home, Adult  Falls can cause injuries and affect people of all ages. There are many simple things that you can do to make your home safe and to help prevent falls. Ask for help when making these changes, if needed.  What actions can I take to prevent falls?  General instructions  • Use good lighting in all rooms. Replace any light bulbs that burn out, turn on lights if it is dark, and use night-lights.  • Place frequently used items in easy-to-reach places. Lower the shelves around your home if necessary.  • Set up furniture so that there are clear paths around it. Avoid moving your furniture around.  • Remove throw rugs and other tripping hazards from the floor.  • Avoid walking on wet floors.  • Fix any uneven floor surfaces.  • Add color or contrast paint or tape to grab bars and handrails in your home. Place contrasting color strips on the first and last steps of staircases.  • When you use a stepladder, make sure that it is completely opened and that the sides and supports are firmly locked. Have someone hold the ladder while you are using it. Do not climb a closed stepladder.  • Know where your pets are when moving through your home.  What can I do in the bathroom?     • Keep the floor dry. Immediately clean up any water that is on the floor.  • Remove soap buildup in the tub or shower regularly.  • Use nonskid mats or decals on the floor of the tub or shower.  • Attach bath mats securely with double-sided, nonslip rug tape.  • If you need to sit down while you are in the shower, use a plastic, nonslip stool.  • Install grab bars by the toilet and in the tub and shower. Do not use towel bars as grab bars.  What can I do in the bedroom?  • Make sure that a bedside light is easy to reach.  • Do not use oversized bedding that reaches the floor.  • Have a firm chair that has side arms to use for getting dressed.  What can I do in the kitchen?  • Clean up any spills right away.  • If you  need to reach for something above you, use a sturdy step stool that has a grab bar.  • Keep electrical cables out of the way.  • Do not use floor polish or wax that makes floors slippery. If you must use wax, make sure that it is non-skid floor wax.  What can I do with my stairs?  • Do not leave any items on the stairs.  • Make sure that you have a light switch at the top and the bottom of the stairs. Have them installed if you do not have them.  • Make sure that there are handrails on both sides of the stairs. Fix handrails that are broken or loose. Make sure that handrails are as long as the staircases.  • Install non-slip stair treads on all stairs in your home.  • Avoid having throw rugs at the top or bottom of stairs, or secure the rugs with carpet tape to prevent them from moving.  • Choose a carpet design that does not hide the edge of steps on the stairs.  • Check any carpeting to make sure that it is firmly attached to the stairs. Fix any carpet that is loose or worn.  What can I do on the outside of my home?  • Use bright outdoor lighting.  • Regularly repair the edges of walkways and driveways and fix any cracks.  • Remove high doorway thresholds.  • Trim any shrubbery on the main path into your home.  • Regularly check that handrails are securely fastened and in good repair. Both sides of all steps should have handrails.  • Install guardrails along the edges of any raised decks or porches.  • Clear walkways of debris and clutter, including tools and rocks.  • Have leaves, snow, and ice cleared regularly.  • Use sand or salt on walkways during winter months.  • In the garage, clean up any spills right away, including grease or oil spills.  What other actions can I take?  • Wear closed-toe shoes that fit well and support your feet. Wear shoes that have rubber soles or low heels.  • Use mobility aids as needed, such as canes, walkers, scooters, and crutches.  • Review your medicines with your health care  provider. Some medicines can cause dizziness or changes in blood pressure, which increase your risk of falling.  Talk with your health care provider about other ways that you can decrease your risk of falls. This may include working with a physical therapist or  to improve your strength, balance, and endurance.  Where to find more information  • Centers for Disease Control and Prevention, [place]AD[employer]: www.cdc.gov  • National [employer] Aging: www.prasad.nih.gov  Contact a health care provider if:  • You are afraid of falling at home.  • You feel weak, drowsy, or dizzy at home.  • You fall at home.  Summary  • There are many simple things that you can do to make your home safe and to help prevent falls.  • Ways to make your home safe include removing tripping hazards and installing grab bars in the bathroom.  • Ask for help when making these changes in your home.  This information is not intended to replace advice given to you by your health care provider. Make sure you discuss any questions you have with your health care provider.  Document Revised: 07/21/2021 Document Reviewed: 07/21/2021  Elsevier Patient Education © 2022 [employer] Inc.

## 2022-11-10 ENCOUNTER — TREATMENT (OUTPATIENT)
Dept: PHYSICAL THERAPY | Facility: CLINIC | Age: 82
End: 2022-11-10

## 2022-11-10 DIAGNOSIS — M54.6 CHRONIC BILATERAL THORACIC BACK PAIN: Primary | ICD-10-CM

## 2022-11-10 DIAGNOSIS — G89.29 CHRONIC BILATERAL THORACIC BACK PAIN: Primary | ICD-10-CM

## 2022-11-10 PROCEDURE — 97161 PT EVAL LOW COMPLEX 20 MIN: CPT | Performed by: PHYSICAL THERAPIST

## 2022-11-10 PROCEDURE — 97140 MANUAL THERAPY 1/> REGIONS: CPT | Performed by: PHYSICAL THERAPIST

## 2022-11-10 NOTE — PROGRESS NOTES
Physical Therapy Initial Evaluation and Plan of Care      Patient: Nicole Garcia   : 1940  Diagnosis/ICD-10 Code:  Chronic bilateral thoracic back pain [M54.6, G89.29]  Referring practitioner: ERICH Morillo*    Subjective Evaluation    History of Present Illness  Date of onset: 11/10/2021  Mechanism of injury: Pt reports that she has had mid back for 3 years. Pt reports that she has had worsening pain over the last year. Pt states that her back hurts with sweeping, standing, and walking. Pt reports that sitting and lying down makes pain immediately better. Pt reports that she has high cholesterol, BP, and history of skin cancer.   Pt reports that she has a lot of meetings and has to go socialize which is limited due to back pain. Pt reports taking tylenol which eases the pain.       Patient Occupation: retired Pain  Current pain ratin  At best pain ratin  At worst pain ratin  Quality: dull ache  Relieving factors: medications  Aggravating factors: lifting and standing  Progression: worsening    Social Support  Lives with: alone    Hand dominance: right    Diagnostic Tests  X-ray: normal    Treatments  Previous treatment: medication  Current treatment: medication  Patient Goals  Patient goals for therapy: decreased pain, increased motion, increased strength and return to sport/leisure activities             Objective          Palpation   Left   No palpable tenderness to the cervical paraspinals.     Right   No palpable tenderness to the cervical paraspinals.     Tenderness   Cervical Spine   Tenderness in the spinous process.     Additional Tenderness Details  Pt tender at T6-7    Active Range of Motion     Lumbar   Flexion: WFL  Left lateral flexion: WFL  Right lateral flexion: WFL    Additional Active Range of Motion Details  Pt with limited cervical rotation as well as thoracic motion.     Lack of mobility is not uncommon but pt is dealing with pain which needs to be addressed for  improved function.   Pt limited in extension and rotation but no pain with movements.           Assessment & Plan     Assessment  Impairments: abnormal or restricted ROM, activity intolerance, impaired physical strength and pain with function  Functional Limitations: walking, uncomfortable because of pain and standing  Assessment details: Patient is a 82 year old female who comes to physical therapy with worsening, chronic, insidious onset of mid back pain. Signs and symptoms are consistent with back pain with mobility deficits resulting in pain, decreased ROM, decreased strength, and inability to perform all essential functional activities. Pt will benefit from skilled PT services to address the above issues.       Goals  Plan Goals: Short term Goals (2 weeks)  1. Pt will demonstrate independence with HEP  2. Pt will be able to walk for 5 mins without pain in the mid back.   3. Pt will be able to complete 30 mins of activities in the clinic without mid back pain    Long Term Goals (6 weeks)  1. Pt will be able to walk and perform daily activities without pain in the thoracic spine  2. Pt will be able to perform 1 hour of activities in the clinic without pain in the thoracic spine  3. Pt will be able to return to her normal social life without pain in the thoracic spine.     Plan  Therapy options: will be seen for skilled therapy services  Planned modality interventions: dry needling, thermotherapy (hydrocollator packs) and ultrasound  Planned therapy interventions: body mechanics training, fine motor coordination training, flexibility, functional ROM exercises, home exercise program, joint mobilization, manual therapy, motor coordination training, neuromuscular re-education, spinal/joint mobilization, strengthening, therapeutic activities, stretching and soft tissue mobilization  Frequency: 2x week  Treatment plan discussed with: patient        Manual Therapy:    14     mins  37007;  Therapeutic Exercise:          mins  60365;     Neuromuscular Keila:        mins  69057;    Therapeutic Activity:          mins  91424;     Gait Training:           mins  10575;     Ultrasound:          mins  59632;    Electrical Stimulation:         mins  46254 ( );  Dry Needling          mins self-pay    Timed Treatment:   14   mins   Total Treatment:     47   mins    PT SIGNATURE: HINA Simons License: [id number]  DATE TREATMENT INITIATED: 11/10/2022    Initial Certification  Certification Period: 2/7/2023  I certify that the therapy services are furnished while this patient is under my care.  The services outlined above are required by this patient, and will be reviewed every 90 days.     PHYSICIAN: Zaida Marrero, JAYDEN      DATE:     Please sign and return via fax to [phone].. Thank you, [facility].

## 2022-11-15 ENCOUNTER — TREATMENT (OUTPATIENT)
Dept: PHYSICAL THERAPY | Facility: CLINIC | Age: 82
End: 2022-11-15

## 2022-11-15 DIAGNOSIS — G89.29 CHRONIC BILATERAL THORACIC BACK PAIN: Primary | ICD-10-CM

## 2022-11-15 DIAGNOSIS — M54.6 CHRONIC BILATERAL THORACIC BACK PAIN: Primary | ICD-10-CM

## 2022-11-15 PROCEDURE — 97110 THERAPEUTIC EXERCISES: CPT | Performed by: PHYSICAL THERAPIST

## 2022-11-15 PROCEDURE — 97140 MANUAL THERAPY 1/> REGIONS: CPT | Performed by: PHYSICAL THERAPIST

## 2022-11-15 NOTE — PROGRESS NOTES
Physical Therapy Daily Treatment Note      Visit #: 2    Nicole Garcia reports 0/10 pain today at rest.  Pt reports that she has been doing her HEP and that she felt pretty good after the mobilizations and STM on the back last session.         Objective Pt present to PT today with no distress at rest.     Pt with hypomobility and kyphosis noted in the thoracic spine today.     Pt responded well to all activities today and light mobilization of the thoracic spine.       See Exercise, Manual, and Modality Logs for complete treatment.     Assessment/Plan  Pt continues to have middle back pain with activities in the community and at home. Pt would benefit from back mobilization and strengthening to help improve activity tolerance and pain free function.       Progress per Plan of Care      Visit Diagnosis:    ICD-10-CM ICD-9-CM   1. Chronic bilateral thoracic back pain  M54.6 724.1    G89.29 338.29            Manual Therapy:    14     mins  18416;  Therapeutic Exercise:    25     mins  90826;     Neuromuscular Keila:        mins  83968;    Therapeutic Activity:          mins  07220;     Gait Training:           mins  82847;     Ultrasound:          mins  53458;    Electrical Stimulation:         mins  15324 ( );  Dry Needling          mins self-pay  Iontophoresis          mins [id number]      Timed Treatment:   39   mins   Total Treatment:     39   mins    Froylan Mendoza, PT  Physical Therapist

## 2022-11-26 ENCOUNTER — TELEMEDICINE (OUTPATIENT)
Dept: FAMILY MEDICINE CLINIC | Facility: TELEHEALTH | Age: 82
End: 2022-11-26

## 2022-11-26 DIAGNOSIS — M17.0 ARTHRITIS OF BOTH KNEES: Primary | ICD-10-CM

## 2022-11-26 PROCEDURE — 99213 OFFICE O/P EST LOW 20 MIN: CPT | Performed by: NURSE PRACTITIONER

## 2022-11-26 RX ORDER — METHYLPREDNISOLONE 4 MG/1
TABLET ORAL
Qty: 21 TABLET | Refills: 0 | Status: SHIPPED | OUTPATIENT
Start: 2022-11-26 | End: 2023-01-10 | Stop reason: HOSPADM

## 2022-11-26 NOTE — PATIENT INSTRUCTIONS
Take prednisone with food as early in the day as possible  Do not take prednisone with NAIDS such as ibuprofen (Advil), naproxen (Aleve), or aspirin  May take Tylenol (acetaminophen) for pain or fever  Follow up with your primary care provider to discuss cause of knee pain and swelling. The presentation is not typical of gout.     Arthritis  Arthritis means joint pain. It can also mean joint disease. A joint is a place where bones come together. There are more than 100 types of arthritis.  What are the causes?  This condition may be caused by:  Wear and tear of a joint. This is the most common cause.  A lot of acid in the blood, which leads to pain in the joint (gout).  Pain and swelling (inflammation) in a joint.  Infection of a joint.  Injuries in the joint.  A reaction to medicines (allergy).  In some cases, the cause may not be known.  What are the signs or symptoms?  Symptoms of this condition include:  Redness at a joint.  Swelling at a joint.  Stiffness at a joint.  Warmth coming from the joint.  A fever.  A feeling of being sick.  How is this treated?  This condition may be treated with:  Treating the cause, if it is known.  Rest.  Raising (elevating) the joint.  Putting cold or hot packs on the joint.  Medicines to treat symptoms and reduce pain and swelling.  Shots of medicines (cortisone) into the joint.  You may also be told to make changes in your life, such as doing exercises and losing weight.  Follow these instructions at home:  Medicines  Take over-the-counter and prescription medicines only as told by your doctor.  Do not take aspirin for pain if your doctor says that you may have gout.  Activity  Rest your joint if your doctor tells you to.  Avoid activities that make the pain worse.  Exercise your joint regularly as told by your doctor. Try doing exercises like:  Swimming.  Water aerobics.  Biking.  Walking.  Managing pain, stiffness, and swelling       If told, put ice on the affected area.  Put  ice in a plastic bag.  Place a towel between your skin and the bag.  Leave the ice on for 20 minutes, 2-3 times per day.  If your joint is swollen, raise (elevate) it above the level of your heart if told by your doctor.  If your joint feels stiff in the morning, try taking a warm shower.  If told, put heat on the affected area. Do this as often as told by your doctor. Use the heat source that your doctor recommends, such as a moist heat pack or a heating pad. If you have diabetes, do not apply heat without asking your doctor. To apply heat:  Place a towel between your skin and the heat source.  Leave the heat on for 20-30 minutes.  Remove the heat if your skin turns bright red. This is very important if you are unable to feel pain, heat, or cold. You may have a greater risk of getting burned.  General instructions  Do not use any products that contain nicotine or tobacco, such as cigarettes, e-cigarettes, and chewing tobacco. If you need help quitting, ask your doctor.  Keep all follow-up visits as told by your doctor. This is important.  Contact a doctor if:  The pain gets worse.  You have a fever.  Get help right away if:  You have very bad pain in your joint.  You have swelling in your joint.  Your joint is red.  Many joints become painful and swollen.  You have very bad back pain.  Your leg is very weak.  You cannot control your pee (urine) or poop (stool).  Summary  Arthritis means joint pain. It can also mean joint disease. A joint is a place where bones come together.  The most common cause of this condition is wear and tear of a joint.  Symptoms of this condition include redness, swelling, or stiffness of the joint.  This condition is treated with rest, raising the joint, medicines, and putting cold or hot packs on the joint.  Follow your doctor's instructions about medicines, activity, exercises, and other home care treatments.  This information is not intended to replace advice given to you by your health  care provider. Make sure you discuss any questions you have with your health care provider.  Document Revised: 11/25/2019 Document Reviewed: 11/25/2019  Elsevier Patient Education © 2022 Elsevier Inc.

## 2022-11-26 NOTE — PROGRESS NOTES
CHIEF COMPLAINT  Chief Complaint   Patient presents with   • Gout         HPI  Nicole Garcia is a 82 y.o. female  presents with her daughter-in-law with complaints of attack in bilateral Knees. Her last bout was in June of 30, 2021.   She has given me permission to discuss her symptoms and treatment with her daughter-in-law present. Her daugher-in-law did call the pharmacy while on the visit to verify treatment June 30, 2021 for her knee pain and is was a MDP.     Review of Systems   Constitutional: Negative for chills, diaphoresis, fatigue and fever.   Musculoskeletal: Positive for arthralgias.       Past Medical History:   Diagnosis Date   • Asthma    • Hypertension        Family History   Problem Relation Age of Onset   • Kidney disease Mother    • Asthma Mother    • Kidney disease Father    • Kidney disease Sister    • Asthma Sister    • Kidney disease Brother    • Asthma Brother    • Asthma Son        Social History     Socioeconomic History   • Marital status:    Tobacco Use   • Smoking status: Never   • Smokeless tobacco: Never   Vaping Use   • Vaping Use: Never used   Substance and Sexual Activity   • Alcohol use: Never   • Drug use: Never       Nicole Garcia  reports that she has never smoked. She has never used smokeless tobacco.         There were no vitals taken for this visit.    PHYSICAL EXAM  Physical Exam   Constitutional: She is oriented to person, place, and time. She appears well-developed and well-nourished. She does not have a sickly appearance. She does not appear ill. No distress.   HENT:   Head: Normocephalic and atraumatic.   Eyes: EOM are normal.   Neck: Neck normal appearance.  Pulmonary/Chest: Effort normal.  No respiratory distress.  Musculoskeletal:      Comments: Bilateral knees with decreased AROM and edema.  No warmth (per patient and daughter-in-law) or erythema noted.    Neurological: She is alert and oriented to person, place, and time.   Skin: Skin is dry.   Psychiatric: She  has a normal mood and affect.         Diagnoses and all orders for this visit:    1. Arthritis of both knees (Primary)    Other orders  -     methylPREDNISolone (MEDROL) 4 MG dose pack; Take as directed on package instructions.  Dispense: 21 tablet; Refill: 0    Not sure this is gout. I have ask her to talk with her primary care provider at next visit.   She was treated for swelling and pain in her knees in June 30, 2021 with MDP and she reports improvement.       The use of a video visit has been reviewed with the patient and verbal informd consent has een obtained. Myself and Nicole Garcia participated in this visit. The patient is located in [address] [place] [address]. I am located in [place], [place]. Mychart and Zoom were utilized.      Note Disclaimer: At [facility], we believe that sharing information builds trust and better   relationships. You are receiving this note because you recently visited [facility]. It is possible you   will see health information before a provider has talked with you about it. This kind of information can   be easy to misunderstand. To help you fully understand what it means for your health, we urge you to   discuss this note with your provider.    Staci Webster, JAYDEN  11/26/2022  10:12 EST

## 2022-11-29 ENCOUNTER — TELEPHONE (OUTPATIENT)
Dept: PHYSICAL THERAPY | Facility: CLINIC | Age: 82
End: 2022-11-29

## 2022-12-02 ENCOUNTER — TREATMENT (OUTPATIENT)
Dept: PHYSICAL THERAPY | Facility: CLINIC | Age: 82
End: 2022-12-02

## 2022-12-02 DIAGNOSIS — M54.6 CHRONIC BILATERAL THORACIC BACK PAIN: Primary | ICD-10-CM

## 2022-12-02 DIAGNOSIS — G89.29 CHRONIC BILATERAL THORACIC BACK PAIN: Primary | ICD-10-CM

## 2022-12-02 PROCEDURE — 97140 MANUAL THERAPY 1/> REGIONS: CPT | Performed by: PHYSICAL THERAPIST

## 2022-12-02 PROCEDURE — 97112 NEUROMUSCULAR REEDUCATION: CPT | Performed by: PHYSICAL THERAPIST

## 2022-12-02 PROCEDURE — 97110 THERAPEUTIC EXERCISES: CPT | Performed by: PHYSICAL THERAPIST

## 2022-12-02 NOTE — PROGRESS NOTES
Physical Therapy Daily Treatment Note      Visit #: 3    Nicole Garcia reports 3/10 pain today at rest.  Pt reports that she has not felt well the last couple of visits and had to miss. Pt reports that she has not felt up to doing anything so she has not kept her HEP. Pt reports that she has not been doing any of her regular activities so does not know how her back has been doing.         Objective Pt present to PT today with no distress at rest.     Pt with no increased pain in the upper back with return to activities in the clinic.       See Exercise, Manual, and Modality Logs for complete treatment.     Assessment/Plan  Pt continues to feel better following activities in the clinic to help mobilize and strengthen the thoracic spine. Pt to continue with PT as tolerated to improve upper back mobility, stability, and pain free function.       Progress per Plan of Care      Visit Diagnosis:    ICD-10-CM ICD-9-CM   1. Chronic bilateral thoracic back pain  M54.6 724.1    G89.29 338.29            Manual Therapy:    12     mins  37597;  Therapeutic Exercise:    20     mins  12088;     Neuromuscular Keila:    10    mins  74432;    Therapeutic Activity:          mins  76336;     Gait Training:           mins  04952;     Ultrasound:          mins  81451;    Electrical Stimulation:         mins  30357 ( );  Dry Needling          mins self-pay  Iontophoresis          mins 72598      Timed Treatment:   42   mins   Total Treatment:     42   mins    Froylan Mendoza, PT  Physical Therapist

## 2022-12-06 ENCOUNTER — TELEPHONE (OUTPATIENT)
Dept: PHYSICAL THERAPY | Facility: OTHER | Age: 82
End: 2022-12-06

## 2022-12-15 ENCOUNTER — TELEPHONE (OUTPATIENT)
Dept: PHYSICAL THERAPY | Facility: OTHER | Age: 82
End: 2022-12-15

## 2023-01-07 ENCOUNTER — HOSPITAL ENCOUNTER (EMERGENCY)
Facility: HOSPITAL | Age: 83
Discharge: ANOTHER HOSPITAL | DRG: 445 | End: 2023-01-07
Attending: EMERGENCY MEDICINE | Admitting: EMERGENCY MEDICINE
Payer: MEDICARE

## 2023-01-07 ENCOUNTER — APPOINTMENT (OUTPATIENT)
Dept: CT IMAGING | Facility: HOSPITAL | Age: 83
DRG: 445 | End: 2023-01-07
Payer: MEDICARE

## 2023-01-07 ENCOUNTER — HOSPITAL ENCOUNTER (INPATIENT)
Facility: HOSPITAL | Age: 83
LOS: 2 days | Discharge: HOME HEALTH | DRG: 445 | End: 2023-01-10
Attending: INTERNAL MEDICINE | Admitting: INTERNAL MEDICINE
Payer: MEDICARE

## 2023-01-07 ENCOUNTER — APPOINTMENT (OUTPATIENT)
Dept: GENERAL RADIOLOGY | Facility: HOSPITAL | Age: 83
DRG: 445 | End: 2023-01-07
Payer: MEDICARE

## 2023-01-07 VITALS
OXYGEN SATURATION: 96 % | BODY MASS INDEX: 23.92 KG/M2 | WEIGHT: 130 LBS | TEMPERATURE: 97.6 F | HEIGHT: 62 IN | SYSTOLIC BLOOD PRESSURE: 129 MMHG | HEART RATE: 77 BPM | DIASTOLIC BLOOD PRESSURE: 63 MMHG | RESPIRATION RATE: 18 BRPM

## 2023-01-07 DIAGNOSIS — R93.5 ABNORMAL CT SCAN, PELVIS: ICD-10-CM

## 2023-01-07 DIAGNOSIS — R17 PAINLESS JAUNDICE: ICD-10-CM

## 2023-01-07 DIAGNOSIS — R17 JAUNDICE: ICD-10-CM

## 2023-01-07 DIAGNOSIS — K83.1 BILIARY OBSTRUCTION: Primary | ICD-10-CM

## 2023-01-07 DIAGNOSIS — N39.0 URINARY TRACT INFECTION WITHOUT HEMATURIA, SITE UNSPECIFIED: ICD-10-CM

## 2023-01-07 DIAGNOSIS — R74.8 ELEVATED LIVER ENZYMES: Primary | ICD-10-CM

## 2023-01-07 DIAGNOSIS — I10 PRIMARY HYPERTENSION: ICD-10-CM

## 2023-01-07 DIAGNOSIS — E87.1 HYPONATREMIA: ICD-10-CM

## 2023-01-07 DIAGNOSIS — E80.6 HYPERBILIRUBINEMIA: ICD-10-CM

## 2023-01-07 DIAGNOSIS — N39.0 ACUTE UTI: ICD-10-CM

## 2023-01-07 DIAGNOSIS — R74.01 TRANSAMINITIS: ICD-10-CM

## 2023-01-07 DIAGNOSIS — K86.89 PANCREATIC MASS: ICD-10-CM

## 2023-01-07 LAB
ALBUMIN SERPL-MCNC: 4 G/DL (ref 3.5–5.2)
ALBUMIN/GLOB SERPL: 1 G/DL
ALP SERPL-CCNC: 544 U/L (ref 39–117)
ALT SERPL W P-5'-P-CCNC: 311 U/L (ref 1–33)
ANION GAP SERPL CALCULATED.3IONS-SCNC: 15.4 MMOL/L (ref 5–15)
AST SERPL-CCNC: 468 U/L (ref 1–32)
BACTERIA UR QL AUTO: ABNORMAL /HPF
BASOPHILS # BLD AUTO: 0.05 10*3/MM3 (ref 0–0.2)
BASOPHILS NFR BLD AUTO: 0.4 % (ref 0–1.5)
BILIRUB SERPL-MCNC: 9.7 MG/DL (ref 0–1.2)
BILIRUB UR QL STRIP: ABNORMAL
BUN SERPL-MCNC: 11 MG/DL (ref 8–23)
BUN/CREAT SERPL: 12.9 (ref 7–25)
CALCIUM SPEC-SCNC: 10.1 MG/DL (ref 8.6–10.5)
CHLORIDE SERPL-SCNC: 91 MMOL/L (ref 98–107)
CLARITY UR: ABNORMAL
CO2 SERPL-SCNC: 25.6 MMOL/L (ref 22–29)
COLOR UR: ABNORMAL
CREAT SERPL-MCNC: 0.85 MG/DL (ref 0.57–1)
D-LACTATE SERPL-SCNC: 1.5 MMOL/L (ref 0.5–2)
DEPRECATED RDW RBC AUTO: 50.4 FL (ref 37–54)
EGFRCR SERPLBLD CKD-EPI 2021: 67.7 ML/MIN/1.73
EOSINOPHIL # BLD AUTO: 0.08 10*3/MM3 (ref 0–0.4)
EOSINOPHIL NFR BLD AUTO: 0.7 % (ref 0.3–6.2)
ERYTHROCYTE [DISTWIDTH] IN BLOOD BY AUTOMATED COUNT: 16.7 % (ref 12.3–15.4)
GLOBULIN UR ELPH-MCNC: 4.1 GM/DL
GLUCOSE SERPL-MCNC: 115 MG/DL (ref 65–99)
GLUCOSE UR STRIP-MCNC: NEGATIVE MG/DL
HCT VFR BLD AUTO: 40.7 % (ref 34–46.6)
HGB BLD-MCNC: 13.9 G/DL (ref 12–15.9)
HGB UR QL STRIP.AUTO: NEGATIVE
HOLD SPECIMEN: NORMAL
HOLD SPECIMEN: NORMAL
HYALINE CASTS UR QL AUTO: ABNORMAL /LPF
IMM GRANULOCYTES # BLD AUTO: 0.16 10*3/MM3 (ref 0–0.05)
IMM GRANULOCYTES NFR BLD AUTO: 1.3 % (ref 0–0.5)
KETONES UR QL STRIP: NEGATIVE
LEUKOCYTE ESTERASE UR QL STRIP.AUTO: ABNORMAL
LYMPHOCYTES # BLD AUTO: 1.16 10*3/MM3 (ref 0.7–3.1)
LYMPHOCYTES NFR BLD AUTO: 9.7 % (ref 19.6–45.3)
MAGNESIUM SERPL-MCNC: 1.5 MG/DL (ref 1.6–2.4)
MCH RBC QN AUTO: 28.5 PG (ref 26.6–33)
MCHC RBC AUTO-ENTMCNC: 34.2 G/DL (ref 31.5–35.7)
MCV RBC AUTO: 83.4 FL (ref 79–97)
MONOCYTES # BLD AUTO: 0.48 10*3/MM3 (ref 0.1–0.9)
MONOCYTES NFR BLD AUTO: 4 % (ref 5–12)
NEUTROPHILS NFR BLD AUTO: 10.01 10*3/MM3 (ref 1.7–7)
NEUTROPHILS NFR BLD AUTO: 83.9 % (ref 42.7–76)
NITRITE UR QL STRIP: POSITIVE
NRBC BLD AUTO-RTO: 0 /100 WBC (ref 0–0.2)
PH UR STRIP.AUTO: <=5 [PH] (ref 5–8)
PLATELET # BLD AUTO: 501 10*3/MM3 (ref 140–450)
PMV BLD AUTO: 10 FL (ref 6–12)
POTASSIUM SERPL-SCNC: 3 MMOL/L (ref 3.5–5.2)
PROCALCITONIN SERPL-MCNC: 0.45 NG/ML (ref 0–0.25)
PROT SERPL-MCNC: 8.1 G/DL (ref 6–8.5)
PROT UR QL STRIP: ABNORMAL
RBC # BLD AUTO: 4.88 10*6/MM3 (ref 3.77–5.28)
RBC # UR STRIP: ABNORMAL /HPF
REF LAB TEST METHOD: ABNORMAL
SODIUM SERPL-SCNC: 132 MMOL/L (ref 136–145)
SP GR UR STRIP: >=1.03 (ref 1–1.03)
SQUAMOUS #/AREA URNS HPF: ABNORMAL /HPF
TROPONIN T SERPL-MCNC: <0.01 NG/ML (ref 0–0.03)
UROBILINOGEN UR QL STRIP: ABNORMAL
WBC # UR STRIP: ABNORMAL /HPF
WBC NRBC COR # BLD: 11.94 10*3/MM3 (ref 3.4–10.8)
WHOLE BLOOD HOLD COAG: NORMAL
WHOLE BLOOD HOLD SPECIMEN: NORMAL

## 2023-01-07 PROCEDURE — 96366 THER/PROPH/DIAG IV INF ADDON: CPT

## 2023-01-07 PROCEDURE — 85025 COMPLETE CBC W/AUTO DIFF WBC: CPT

## 2023-01-07 PROCEDURE — 81001 URINALYSIS AUTO W/SCOPE: CPT

## 2023-01-07 PROCEDURE — 84145 PROCALCITONIN (PCT): CPT | Performed by: EMERGENCY MEDICINE

## 2023-01-07 PROCEDURE — 96365 THER/PROPH/DIAG IV INF INIT: CPT

## 2023-01-07 PROCEDURE — 96368 THER/DIAG CONCURRENT INF: CPT

## 2023-01-07 PROCEDURE — 0 POTASSIUM CHLORIDE 10 MEQ/100ML SOLUTION: Performed by: EMERGENCY MEDICINE

## 2023-01-07 PROCEDURE — 96367 TX/PROPH/DG ADDL SEQ IV INF: CPT

## 2023-01-07 PROCEDURE — 87040 BLOOD CULTURE FOR BACTERIA: CPT | Performed by: EMERGENCY MEDICINE

## 2023-01-07 PROCEDURE — 36415 COLL VENOUS BLD VENIPUNCTURE: CPT

## 2023-01-07 PROCEDURE — 74177 CT ABD & PELVIS W/CONTRAST: CPT

## 2023-01-07 PROCEDURE — 71045 X-RAY EXAM CHEST 1 VIEW: CPT

## 2023-01-07 PROCEDURE — 25010000002 PIPERACILLIN SOD-TAZOBACTAM PER 1 G: Performed by: EMERGENCY MEDICINE

## 2023-01-07 PROCEDURE — 84484 ASSAY OF TROPONIN QUANT: CPT

## 2023-01-07 PROCEDURE — 99285 EMERGENCY DEPT VISIT HI MDM: CPT

## 2023-01-07 PROCEDURE — 25010000002 IOPAMIDOL 61 % SOLUTION: Performed by: EMERGENCY MEDICINE

## 2023-01-07 PROCEDURE — 83605 ASSAY OF LACTIC ACID: CPT | Performed by: EMERGENCY MEDICINE

## 2023-01-07 PROCEDURE — 80053 COMPREHEN METABOLIC PANEL: CPT

## 2023-01-07 PROCEDURE — 25010000002 MAGNESIUM SULFATE 2 GM/50ML SOLUTION: Performed by: EMERGENCY MEDICINE

## 2023-01-07 PROCEDURE — 93005 ELECTROCARDIOGRAM TRACING: CPT

## 2023-01-07 PROCEDURE — 83735 ASSAY OF MAGNESIUM: CPT

## 2023-01-07 RX ORDER — POTASSIUM CHLORIDE 7.45 MG/ML
10 INJECTION INTRAVENOUS ONCE
Status: COMPLETED | OUTPATIENT
Start: 2023-01-07 | End: 2023-01-07

## 2023-01-07 RX ORDER — POTASSIUM CHLORIDE 7.45 MG/ML
10 INJECTION INTRAVENOUS ONCE
Status: DISCONTINUED | OUTPATIENT
Start: 2023-01-07 | End: 2023-01-07 | Stop reason: HOSPADM

## 2023-01-07 RX ORDER — SODIUM CHLORIDE 0.9 % (FLUSH) 0.9 %
10 SYRINGE (ML) INJECTION AS NEEDED
Status: DISCONTINUED | OUTPATIENT
Start: 2023-01-07 | End: 2023-01-07 | Stop reason: HOSPADM

## 2023-01-07 RX ORDER — MAGNESIUM SULFATE HEPTAHYDRATE 40 MG/ML
2 INJECTION, SOLUTION INTRAVENOUS ONCE
Status: COMPLETED | OUTPATIENT
Start: 2023-01-07 | End: 2023-01-07

## 2023-01-07 RX ORDER — SODIUM CHLORIDE 9 MG/ML
125 INJECTION, SOLUTION INTRAVENOUS CONTINUOUS
Status: DISCONTINUED | OUTPATIENT
Start: 2023-01-07 | End: 2023-01-07 | Stop reason: HOSPADM

## 2023-01-07 RX ADMIN — POTASSIUM CHLORIDE 10 MEQ: 7.46 INJECTION, SOLUTION INTRAVENOUS at 18:38

## 2023-01-07 RX ADMIN — TAZOBACTAM SODIUM AND PIPERACILLIN SODIUM 3.38 G: 375; 3 INJECTION, SOLUTION INTRAVENOUS at 16:13

## 2023-01-07 RX ADMIN — POTASSIUM CHLORIDE 10 MEQ: 7.46 INJECTION, SOLUTION INTRAVENOUS at 19:47

## 2023-01-07 RX ADMIN — SODIUM CHLORIDE 1000 ML: 9 INJECTION, SOLUTION INTRAVENOUS at 14:47

## 2023-01-07 RX ADMIN — SODIUM CHLORIDE 125 ML/HR: 9 INJECTION, SOLUTION INTRAVENOUS at 18:39

## 2023-01-07 RX ADMIN — MAGNESIUM SULFATE HEPTAHYDRATE 2 G: 40 INJECTION, SOLUTION INTRAVENOUS at 18:37

## 2023-01-07 RX ADMIN — POTASSIUM CHLORIDE 10 MEQ: 7.46 INJECTION, SOLUTION INTRAVENOUS at 20:56

## 2023-01-07 RX ADMIN — IOPAMIDOL 100 ML: 612 INJECTION, SOLUTION INTRAVENOUS at 14:38

## 2023-01-07 NOTE — ED PROVIDER NOTES
"  HPI: Nicole Garcia is a 82 y.o. female who presents to the emergency department complaining of \"I'm yellow\".  Patient states that for the last few days she has noticed that she is yellow.  She also notes she has had poor appetite and fatigue for about 10 days.  She thinks that she may have had some low-grade fevers but has not checked her temperature.  She denies any abdominal pain, vomiting, diarrhea.      REVIEW OF SYSTEMS: All other systems reviewed and are negative     PAST MEDICAL HISTORY:   Past Medical History:   Diagnosis Date   • Arthritis    • Asthma    • Hypertension         FAMILY HISTORY:   Family History   Problem Relation Age of Onset   • Kidney disease Mother    • Asthma Mother    • Kidney disease Father    • Kidney disease Sister    • Asthma Sister    • Kidney disease Brother    • Asthma Brother    • Asthma Son         SOCIAL HISTORY:   Social History     Socioeconomic History   • Marital status:    Tobacco Use   • Smoking status: Never   • Smokeless tobacco: Never   Vaping Use   • Vaping Use: Never used   Substance and Sexual Activity   • Alcohol use: Never   • Drug use: Never   • Sexual activity: Defer        SURGICAL HISTORY:   Past Surgical History:   Procedure Laterality Date   • APPENDECTOMY     • BREAST SURGERY      cyst removed   • SKIN CANCER EXCISION     • TONSILLECTOMY     • TUBAL ABDOMINAL LIGATION          ALLERGIES: Lactose intolerance (gi), Aspirin, and Procaine       PHYSICAL EXAM:   VITAL SIGNS:   Vitals:    01/07/23 2139   BP: 129/63   Pulse: 77   Resp: 18   Temp: 97.6 °F (36.4 °C)   SpO2: 96%      CONSTITUTIONAL: Awake, well appearing, nontoxic   HENT: Atraumatic, normocephalic, oral mucosa moist, airway patent. Nares patent without drainage. External ears normal.   EYES: Icterus, EOMI, PERRL   NECK: Trachea midline, nontender, supple   CARDIOVASCULAR: Normal heart rate, Normal rhythm.  PULMONARY/CHEST: Normal work of breathing. Clear to auscultation, no rhonchi, wheezes, " or rales.  ABDOMINAL: Nondistended, soft, nontender, no rebound or guarding.  NEUROLOGIC: Nonfocal, moves all four extremities, no gross sensory or motor deficits.   EXTREMITIES: No clubbing, cyanosis, or edema   SKIN: Warm, Dry, No erythema, jaundiced      ED COURSE / MEDICAL DECISION MAKING:     Nicole Garcia is a 82 y.o. female who presents to the emergency department for evaluation of jaundice, fatigue.  Well-developed, well-nourished elderly lady in no distress with exam as above.  Her vital signs are normal.  Her oxygen saturation is normal at 96% on room air.  Her exam is notable for jaundice.  Her abdominal exam is benign.  We will obtain basic labs and CT imaging.  Disposition pending.    Differential diagnosis includes pancreatic mass, cholangitis, cholelithiasis, cholecystitis among other etiologies.    EKG interpreted by me: Sinus rhythm, normal rate, left bundle branch block, no acute ST/T changes, this is an abnormal EKG    1714  Initial labs notable for leukocytosis, transaminitis, hyperbilirubinemia and elevated procalcitonin.  CT imaging reveals soft tissue density at the distal CBD, intrahepatic and extrahepatic biliary ductal dilatation.  There is also some abnormal focal gallbladder wall thickening.  There was also note made of extra peritoneal air in the pelvis inferior to the bladder.  Patient and family updated on all findings.  I did evaluate her perineum with STONEY Shay, no evidence of any erythema, ulcerations or necrosis.  Patient ultimately needs evaluation by GI, general surgery and likely oncology.  I did discuss case with our surgeon, Dr. Curtis, she is in agreement with transfer.  Patient and family would like to go to [facility].  Have placed call for possible transfer.    1738  Discussed with Dr. Quintana, [facility] hospitalist, recommended continuing Zosyn and correcting electrolytes. Patient has been accepted and placed on wait list.     35 minutes of critical care provided.  This time excludes other billable procedures. Time does include preparation of documents, medical consultations, review of old records, and direct bedside care. Patient is at high risk for life-threatening deterioration due to biliary obstruction, UTI, extraperitoneal air.       Final diagnoses:   Biliary obstruction   Transaminitis   Urinary tract infection without hematuria, site unspecified   Hyperbilirubinemia   Jaundice   Abnormal CT scan, pelvis        Mahesh García MD  01/08/23 0709

## 2023-01-08 ENCOUNTER — APPOINTMENT (OUTPATIENT)
Dept: GENERAL RADIOLOGY | Facility: HOSPITAL | Age: 83
DRG: 445 | End: 2023-01-08
Payer: MEDICARE

## 2023-01-08 ENCOUNTER — APPOINTMENT (OUTPATIENT)
Dept: CARDIOLOGY | Facility: HOSPITAL | Age: 83
DRG: 445 | End: 2023-01-08
Payer: MEDICARE

## 2023-01-08 ENCOUNTER — ANESTHESIA (OUTPATIENT)
Dept: GASTROENTEROLOGY | Facility: HOSPITAL | Age: 83
DRG: 445 | End: 2023-01-08
Payer: MEDICARE

## 2023-01-08 ENCOUNTER — ANESTHESIA EVENT (OUTPATIENT)
Dept: GASTROENTEROLOGY | Facility: HOSPITAL | Age: 83
DRG: 445 | End: 2023-01-08
Payer: MEDICARE

## 2023-01-08 PROBLEM — J45.909 ASTHMA: Status: ACTIVE | Noted: 2023-01-08

## 2023-01-08 PROBLEM — E87.1 HYPONATREMIA: Status: ACTIVE | Noted: 2023-01-08

## 2023-01-08 PROBLEM — E87.6 HYPOKALEMIA: Status: ACTIVE | Noted: 2023-01-08

## 2023-01-08 PROBLEM — R17 PAINLESS JAUNDICE: Status: ACTIVE | Noted: 2023-01-08

## 2023-01-08 PROBLEM — R74.8 ELEVATED LIVER ENZYMES: Status: ACTIVE | Noted: 2023-01-08

## 2023-01-08 PROBLEM — N39.0 ACUTE UTI: Status: ACTIVE | Noted: 2023-01-08

## 2023-01-08 LAB
ALBUMIN SERPL-MCNC: 3.1 G/DL (ref 3.5–5.2)
ALBUMIN/GLOB SERPL: 1.1 G/DL
ALP SERPL-CCNC: 424 U/L (ref 39–117)
ALT SERPL W P-5'-P-CCNC: 233 U/L (ref 1–33)
ANION GAP SERPL CALCULATED.3IONS-SCNC: 14 MMOL/L (ref 5–15)
AST SERPL-CCNC: 380 U/L (ref 1–32)
BH CV ECHO MEAS - AI P1/2T: 367.3 MSEC
BH CV ECHO MEAS - AO MAX PG: 8.6 MMHG
BH CV ECHO MEAS - AO MEAN PG: 5 MMHG
BH CV ECHO MEAS - AO ROOT DIAM: 2.8 CM
BH CV ECHO MEAS - AO V2 MAX: 147 CM/SEC
BH CV ECHO MEAS - AO V2 VTI: 30.9 CM
BH CV ECHO MEAS - AVA(I,D): 2.32 CM2
BH CV ECHO MEAS - EDV(CUBED): 50.7 ML
BH CV ECHO MEAS - EDV(MOD-SP2): 44.2 ML
BH CV ECHO MEAS - EDV(MOD-SP4): 53 ML
BH CV ECHO MEAS - EF(MOD-BP): 58.4 %
BH CV ECHO MEAS - EF(MOD-SP2): 59.3 %
BH CV ECHO MEAS - EF(MOD-SP4): 57 %
BH CV ECHO MEAS - ESV(CUBED): 13.8 ML
BH CV ECHO MEAS - ESV(MOD-SP2): 18 ML
BH CV ECHO MEAS - ESV(MOD-SP4): 22.8 ML
BH CV ECHO MEAS - FS: 35.1 %
BH CV ECHO MEAS - IVS/LVPW: 1 CM
BH CV ECHO MEAS - IVSD: 1.2 CM
BH CV ECHO MEAS - LA DIMENSION: 3.2 CM
BH CV ECHO MEAS - LAT PEAK E' VEL: 7.8 CM/SEC
BH CV ECHO MEAS - LV MASS(C)D: 147.3 GRAMS
BH CV ECHO MEAS - LV MAX PG: 4.5 MMHG
BH CV ECHO MEAS - LV MEAN PG: 2.5 MMHG
BH CV ECHO MEAS - LV V1 MAX: 105.5 CM/SEC
BH CV ECHO MEAS - LV V1 VTI: 22.8 CM
BH CV ECHO MEAS - LVIDD: 3.7 CM
BH CV ECHO MEAS - LVIDS: 2.4 CM
BH CV ECHO MEAS - LVOT AREA: 3.1 CM2
BH CV ECHO MEAS - LVOT DIAM: 2 CM
BH CV ECHO MEAS - LVPWD: 1.2 CM
BH CV ECHO MEAS - MED PEAK E' VEL: 5.7 CM/SEC
BH CV ECHO MEAS - MR MAX PG: 75.6 MMHG
BH CV ECHO MEAS - MR MAX VEL: 431 CM/SEC
BH CV ECHO MEAS - MV A MAX VEL: 102 CM/SEC
BH CV ECHO MEAS - MV DEC SLOPE: 364 CM/SEC2
BH CV ECHO MEAS - MV DEC TIME: 0.27 MSEC
BH CV ECHO MEAS - MV E MAX VEL: 66.8 CM/SEC
BH CV ECHO MEAS - MV E/A: 0.65
BH CV ECHO MEAS - MV MAX PG: 8.3 MMHG
BH CV ECHO MEAS - MV MEAN PG: 2 MMHG
BH CV ECHO MEAS - MV P1/2T: 82.9 MSEC
BH CV ECHO MEAS - MV V2 VTI: 34.4 CM
BH CV ECHO MEAS - MVA(P1/2T): 2.7 CM2
BH CV ECHO MEAS - MVA(VTI): 2.08 CM2
BH CV ECHO MEAS - PA ACC TIME: 0.11 SEC
BH CV ECHO MEAS - PA PR(ACCEL): 30 MMHG
BH CV ECHO MEAS - PA V2 MAX: 117 CM/SEC
BH CV ECHO MEAS - PI END-D VEL: 85.9 CM/SEC
BH CV ECHO MEAS - RAP SYSTOLE: 3 MMHG
BH CV ECHO MEAS - RVSP: 41 MMHG
BH CV ECHO MEAS - SV(LVOT): 71.6 ML
BH CV ECHO MEAS - SV(MOD-SP2): 26.2 ML
BH CV ECHO MEAS - SV(MOD-SP4): 30.2 ML
BH CV ECHO MEAS - TAPSE (>1.6): 1.57 CM
BH CV ECHO MEAS - TR MAX PG: 38 MMHG
BH CV ECHO MEAS - TR MAX VEL: 286 CM/SEC
BH CV ECHO MEASUREMENTS AVERAGE E/E' RATIO: 9.9
BH CV XLRA - RV BASE: 3.2 CM
BH CV XLRA - RV LENGTH: 6.5 CM
BH CV XLRA - RV MID: 2.6 CM
BH CV XLRA - TDI S': 13.2 CM/SEC
BILIRUB SERPL-MCNC: 9.9 MG/DL (ref 0–1.2)
BUN SERPL-MCNC: 9 MG/DL (ref 8–23)
BUN/CREAT SERPL: 12.3 (ref 7–25)
CALCIUM SPEC-SCNC: 8.7 MG/DL (ref 8.6–10.5)
CANCER AG19-9 SERPL-ACNC: 1653 U/ML
CHLORIDE SERPL-SCNC: 98 MMOL/L (ref 98–107)
CO2 SERPL-SCNC: 25 MMOL/L (ref 22–29)
CREAT SERPL-MCNC: 0.73 MG/DL (ref 0.57–1)
DEPRECATED RDW RBC AUTO: 52.1 FL (ref 37–54)
EGFRCR SERPLBLD CKD-EPI 2021: 81.2 ML/MIN/1.73
ERYTHROCYTE [DISTWIDTH] IN BLOOD BY AUTOMATED COUNT: 16.9 % (ref 12.3–15.4)
GLOBULIN UR ELPH-MCNC: 2.7 GM/DL
GLUCOSE SERPL-MCNC: 106 MG/DL (ref 65–99)
HCT VFR BLD AUTO: 32.9 % (ref 34–46.6)
HGB BLD-MCNC: 11.3 G/DL (ref 12–15.9)
LEFT ATRIUM VOLUME INDEX: 28.2 ML/M2
MAXIMAL PREDICTED HEART RATE: 136 BPM
MCH RBC QN AUTO: 29.2 PG (ref 26.6–33)
MCHC RBC AUTO-ENTMCNC: 34.3 G/DL (ref 31.5–35.7)
MCV RBC AUTO: 85 FL (ref 79–97)
PLATELET # BLD AUTO: 400 10*3/MM3 (ref 140–450)
PMV BLD AUTO: 10.3 FL (ref 6–12)
POTASSIUM SERPL-SCNC: 3.6 MMOL/L (ref 3.5–5.2)
PROT SERPL-MCNC: 5.8 G/DL (ref 6–8.5)
RBC # BLD AUTO: 3.87 10*6/MM3 (ref 3.77–5.28)
SODIUM SERPL-SCNC: 137 MMOL/L (ref 136–145)
STRESS TARGET HR: 116 BPM
WBC NRBC COR # BLD: 10.47 10*3/MM3 (ref 3.4–10.8)

## 2023-01-08 PROCEDURE — 99223 1ST HOSP IP/OBS HIGH 75: CPT | Performed by: INTERNAL MEDICINE

## 2023-01-08 PROCEDURE — 86301 IMMUNOASSAY TUMOR CA 19-9: CPT | Performed by: INTERNAL MEDICINE

## 2023-01-08 PROCEDURE — 25010000002 MAGNESIUM SULFATE 2 GM/50ML SOLUTION: Performed by: PHYSICIAN ASSISTANT

## 2023-01-08 PROCEDURE — 25010000002 IOPAMIDOL 61 % SOLUTION: Performed by: INTERNAL MEDICINE

## 2023-01-08 PROCEDURE — 25010000002 PIPERACILLIN SOD-TAZOBACTAM PER 1 G: Performed by: PHYSICIAN ASSISTANT

## 2023-01-08 PROCEDURE — 80053 COMPREHEN METABOLIC PANEL: CPT | Performed by: PHYSICIAN ASSISTANT

## 2023-01-08 PROCEDURE — 85027 COMPLETE CBC AUTOMATED: CPT | Performed by: PHYSICIAN ASSISTANT

## 2023-01-08 PROCEDURE — 43274 ERCP DUCT STENT PLACEMENT: CPT | Performed by: INTERNAL MEDICINE

## 2023-01-08 PROCEDURE — C1874 STENT, COATED/COV W/DEL SYS: HCPCS | Performed by: INTERNAL MEDICINE

## 2023-01-08 PROCEDURE — 25010000002 MAGNESIUM SULFATE 2 GM/50ML SOLUTION: Performed by: INTERNAL MEDICINE

## 2023-01-08 PROCEDURE — 25010000002 DEXAMETHASONE PER 1 MG: Performed by: NURSE ANESTHETIST, CERTIFIED REGISTERED

## 2023-01-08 PROCEDURE — 0F798DZ DILATION OF COMMON BILE DUCT WITH INTRALUMINAL DEVICE, VIA NATURAL OR ARTIFICIAL OPENING ENDOSCOPIC: ICD-10-PCS | Performed by: INTERNAL MEDICINE

## 2023-01-08 PROCEDURE — 93306 TTE W/DOPPLER COMPLETE: CPT

## 2023-01-08 PROCEDURE — 74330 X-RAY BILE/PANC ENDOSCOPY: CPT

## 2023-01-08 PROCEDURE — 25010000002 PIPERACILLIN SOD-TAZOBACTAM PER 1 G: Performed by: INTERNAL MEDICINE

## 2023-01-08 PROCEDURE — C1769 GUIDE WIRE: HCPCS | Performed by: INTERNAL MEDICINE

## 2023-01-08 PROCEDURE — 0FD98ZX EXTRACTION OF COMMON BILE DUCT, VIA NATURAL OR ARTIFICIAL OPENING ENDOSCOPIC, DIAGNOSTIC: ICD-10-PCS | Performed by: INTERNAL MEDICINE

## 2023-01-08 PROCEDURE — 99223 1ST HOSP IP/OBS HIGH 75: CPT | Performed by: NURSE PRACTITIONER

## 2023-01-08 PROCEDURE — 25010000002 ONDANSETRON PER 1 MG: Performed by: NURSE ANESTHETIST, CERTIFIED REGISTERED

## 2023-01-08 PROCEDURE — 25010000002 PROPOFOL 10 MG/ML EMULSION: Performed by: NURSE ANESTHETIST, CERTIFIED REGISTERED

## 2023-01-08 PROCEDURE — 25010000002 ONDANSETRON PER 1 MG: Performed by: INTERNAL MEDICINE

## 2023-01-08 DEVICE — STENT SYSTEM RMV
Type: IMPLANTABLE DEVICE | Site: BILE DUCT | Status: FUNCTIONAL
Brand: WALLFLEX BILIARY

## 2023-01-08 RX ORDER — MAGNESIUM SULFATE HEPTAHYDRATE 40 MG/ML
2 INJECTION, SOLUTION INTRAVENOUS AS NEEDED
Status: DISCONTINUED | OUTPATIENT
Start: 2023-01-08 | End: 2023-01-10 | Stop reason: HOSPADM

## 2023-01-08 RX ORDER — SODIUM CHLORIDE, SODIUM LACTATE, POTASSIUM CHLORIDE, CALCIUM CHLORIDE 600; 310; 30; 20 MG/100ML; MG/100ML; MG/100ML; MG/100ML
75 INJECTION, SOLUTION INTRAVENOUS CONTINUOUS
Status: ACTIVE | OUTPATIENT
Start: 2023-01-08 | End: 2023-01-08

## 2023-01-08 RX ORDER — CHOLECALCIFEROL (VITAMIN D3) 125 MCG
5 CAPSULE ORAL NIGHTLY PRN
Status: DISCONTINUED | OUTPATIENT
Start: 2023-01-08 | End: 2023-01-10 | Stop reason: HOSPADM

## 2023-01-08 RX ORDER — SODIUM CHLORIDE 0.9 % (FLUSH) 0.9 %
10 SYRINGE (ML) INJECTION AS NEEDED
Status: DISCONTINUED | OUTPATIENT
Start: 2023-01-08 | End: 2023-01-10 | Stop reason: HOSPADM

## 2023-01-08 RX ORDER — ONDANSETRON 2 MG/ML
INJECTION INTRAMUSCULAR; INTRAVENOUS AS NEEDED
Status: DISCONTINUED | OUTPATIENT
Start: 2023-01-08 | End: 2023-01-08 | Stop reason: SURG

## 2023-01-08 RX ORDER — SODIUM CHLORIDE 9 MG/ML
40 INJECTION, SOLUTION INTRAVENOUS AS NEEDED
Status: CANCELLED | OUTPATIENT
Start: 2023-01-08

## 2023-01-08 RX ORDER — LIDOCAINE HYDROCHLORIDE 10 MG/ML
INJECTION, SOLUTION EPIDURAL; INFILTRATION; INTRACAUDAL; PERINEURAL AS NEEDED
Status: DISCONTINUED | OUTPATIENT
Start: 2023-01-08 | End: 2023-01-08 | Stop reason: SURG

## 2023-01-08 RX ORDER — HYDROCODONE BITARTRATE AND ACETAMINOPHEN 5; 325 MG/1; MG/1
1 TABLET ORAL EVERY 6 HOURS PRN
Status: DISCONTINUED | OUTPATIENT
Start: 2023-01-08 | End: 2023-01-10 | Stop reason: HOSPADM

## 2023-01-08 RX ORDER — SODIUM CHLORIDE 0.9 % (FLUSH) 0.9 %
10 SYRINGE (ML) INJECTION EVERY 12 HOURS SCHEDULED
Status: CANCELLED | OUTPATIENT
Start: 2023-01-08

## 2023-01-08 RX ORDER — MIDAZOLAM HYDROCHLORIDE 1 MG/ML
0.5 INJECTION INTRAMUSCULAR; INTRAVENOUS
Status: CANCELLED | OUTPATIENT
Start: 2023-01-08

## 2023-01-08 RX ORDER — SODIUM CHLORIDE 0.9 % (FLUSH) 0.9 %
10 SYRINGE (ML) INJECTION EVERY 12 HOURS SCHEDULED
Status: DISCONTINUED | OUTPATIENT
Start: 2023-01-08 | End: 2023-01-10 | Stop reason: HOSPADM

## 2023-01-08 RX ORDER — SODIUM CHLORIDE, SODIUM LACTATE, POTASSIUM CHLORIDE, CALCIUM CHLORIDE 600; 310; 30; 20 MG/100ML; MG/100ML; MG/100ML; MG/100ML
INJECTION, SOLUTION INTRAVENOUS CONTINUOUS PRN
Status: DISCONTINUED | OUTPATIENT
Start: 2023-01-08 | End: 2023-01-08 | Stop reason: SURG

## 2023-01-08 RX ORDER — ROCURONIUM BROMIDE 10 MG/ML
INJECTION, SOLUTION INTRAVENOUS AS NEEDED
Status: DISCONTINUED | OUTPATIENT
Start: 2023-01-08 | End: 2023-01-08 | Stop reason: SURG

## 2023-01-08 RX ORDER — SODIUM CHLORIDE 9 MG/ML
40 INJECTION, SOLUTION INTRAVENOUS AS NEEDED
Status: DISCONTINUED | OUTPATIENT
Start: 2023-01-08 | End: 2023-01-10 | Stop reason: HOSPADM

## 2023-01-08 RX ORDER — PROPOFOL 10 MG/ML
VIAL (ML) INTRAVENOUS AS NEEDED
Status: DISCONTINUED | OUTPATIENT
Start: 2023-01-08 | End: 2023-01-08 | Stop reason: SURG

## 2023-01-08 RX ORDER — LIDOCAINE HYDROCHLORIDE 10 MG/ML
0.5 INJECTION, SOLUTION EPIDURAL; INFILTRATION; INTRACAUDAL; PERINEURAL ONCE AS NEEDED
Status: CANCELLED | OUTPATIENT
Start: 2023-01-08

## 2023-01-08 RX ORDER — SODIUM CHLORIDE, SODIUM LACTATE, POTASSIUM CHLORIDE, CALCIUM CHLORIDE 600; 310; 30; 20 MG/100ML; MG/100ML; MG/100ML; MG/100ML
9 INJECTION, SOLUTION INTRAVENOUS CONTINUOUS
Status: CANCELLED | OUTPATIENT
Start: 2023-01-08

## 2023-01-08 RX ORDER — ONDANSETRON 2 MG/ML
4 INJECTION INTRAMUSCULAR; INTRAVENOUS EVERY 6 HOURS PRN
Status: DISCONTINUED | OUTPATIENT
Start: 2023-01-08 | End: 2023-01-10 | Stop reason: HOSPADM

## 2023-01-08 RX ORDER — IPRATROPIUM BROMIDE AND ALBUTEROL SULFATE 2.5; .5 MG/3ML; MG/3ML
3 SOLUTION RESPIRATORY (INHALATION) ONCE AS NEEDED
Status: DISCONTINUED | OUTPATIENT
Start: 2023-01-08 | End: 2023-01-08 | Stop reason: HOSPADM

## 2023-01-08 RX ORDER — ACETAMINOPHEN 325 MG/1
650 TABLET ORAL EVERY 6 HOURS PRN
Status: DISCONTINUED | OUTPATIENT
Start: 2023-01-08 | End: 2023-01-10 | Stop reason: HOSPADM

## 2023-01-08 RX ORDER — FAMOTIDINE 20 MG/1
20 TABLET, FILM COATED ORAL ONCE
Status: CANCELLED | OUTPATIENT
Start: 2023-01-08 | End: 2023-01-08

## 2023-01-08 RX ORDER — ONDANSETRON 2 MG/ML
4 INJECTION INTRAMUSCULAR; INTRAVENOUS ONCE AS NEEDED
Status: DISCONTINUED | OUTPATIENT
Start: 2023-01-08 | End: 2023-01-08 | Stop reason: HOSPADM

## 2023-01-08 RX ORDER — SODIUM CHLORIDE 0.9 % (FLUSH) 0.9 %
10 SYRINGE (ML) INJECTION AS NEEDED
Status: CANCELLED | OUTPATIENT
Start: 2023-01-08

## 2023-01-08 RX ORDER — MAGNESIUM SULFATE HEPTAHYDRATE 40 MG/ML
4 INJECTION, SOLUTION INTRAVENOUS AS NEEDED
Status: DISCONTINUED | OUTPATIENT
Start: 2023-01-08 | End: 2023-01-10 | Stop reason: HOSPADM

## 2023-01-08 RX ORDER — DEXAMETHASONE SODIUM PHOSPHATE 4 MG/ML
INJECTION, SOLUTION INTRA-ARTICULAR; INTRALESIONAL; INTRAMUSCULAR; INTRAVENOUS; SOFT TISSUE AS NEEDED
Status: DISCONTINUED | OUTPATIENT
Start: 2023-01-08 | End: 2023-01-08 | Stop reason: SURG

## 2023-01-08 RX ORDER — FAMOTIDINE 10 MG/ML
20 INJECTION, SOLUTION INTRAVENOUS ONCE
Status: CANCELLED | OUTPATIENT
Start: 2023-01-08 | End: 2023-01-08

## 2023-01-08 RX ADMIN — PROPOFOL 150 MG: 10 INJECTION, EMULSION INTRAVENOUS at 11:14

## 2023-01-08 RX ADMIN — HYDROCODONE BITARTRATE AND ACETAMINOPHEN 1 TABLET: 5; 325 TABLET ORAL at 23:30

## 2023-01-08 RX ADMIN — ONDANSETRON 4 MG: 2 INJECTION INTRAMUSCULAR; INTRAVENOUS at 11:28

## 2023-01-08 RX ADMIN — ONDANSETRON 4 MG: 2 INJECTION INTRAMUSCULAR; INTRAVENOUS at 23:29

## 2023-01-08 RX ADMIN — TAZOBACTAM SODIUM AND PIPERACILLIN SODIUM 3.38 G: 375; 3 INJECTION, SOLUTION INTRAVENOUS at 13:10

## 2023-01-08 RX ADMIN — MAGNESIUM SULFATE HEPTAHYDRATE 2 G: 2 INJECTION, SOLUTION INTRAVENOUS at 19:13

## 2023-01-08 RX ADMIN — HYDROCODONE BITARTRATE AND ACETAMINOPHEN 1 TABLET: 5; 325 TABLET ORAL at 17:34

## 2023-01-08 RX ADMIN — SUGAMMADEX 200 MG: 100 INJECTION, SOLUTION INTRAVENOUS at 12:25

## 2023-01-08 RX ADMIN — MAGNESIUM SULFATE HEPTAHYDRATE 2 G: 2 INJECTION, SOLUTION INTRAVENOUS at 16:00

## 2023-01-08 RX ADMIN — SODIUM CHLORIDE, POTASSIUM CHLORIDE, SODIUM LACTATE AND CALCIUM CHLORIDE 75 ML/HR: 600; 310; 30; 20 INJECTION, SOLUTION INTRAVENOUS at 13:11

## 2023-01-08 RX ADMIN — LIDOCAINE HYDROCHLORIDE 50 MG: 10 INJECTION, SOLUTION EPIDURAL; INFILTRATION; INTRACAUDAL; PERINEURAL at 11:14

## 2023-01-08 RX ADMIN — SODIUM CHLORIDE, POTASSIUM CHLORIDE, SODIUM LACTATE AND CALCIUM CHLORIDE: 600; 310; 30; 20 INJECTION, SOLUTION INTRAVENOUS at 11:30

## 2023-01-08 RX ADMIN — ROCURONIUM BROMIDE 30 MG: 10 INJECTION, SOLUTION INTRAVENOUS at 11:14

## 2023-01-08 RX ADMIN — TAZOBACTAM SODIUM AND PIPERACILLIN SODIUM 3.38 G: 375; 3 INJECTION, SOLUTION INTRAVENOUS at 23:29

## 2023-01-08 RX ADMIN — DEXAMETHASONE SODIUM PHOSPHATE 8 MG: 4 INJECTION, SOLUTION INTRAMUSCULAR; INTRAVENOUS at 11:28

## 2023-01-08 RX ADMIN — ONDANSETRON 4 MG: 2 INJECTION INTRAMUSCULAR; INTRAVENOUS at 17:34

## 2023-01-08 RX ADMIN — TAZOBACTAM SODIUM AND PIPERACILLIN SODIUM 3.38 G: 375; 3 INJECTION, SOLUTION INTRAVENOUS at 18:02

## 2023-01-08 RX ADMIN — Medication 10 ML: at 01:37

## 2023-01-08 RX ADMIN — SODIUM CHLORIDE, POTASSIUM CHLORIDE, SODIUM LACTATE AND CALCIUM CHLORIDE: 600; 310; 30; 20 INJECTION, SOLUTION INTRAVENOUS at 11:10

## 2023-01-08 RX ADMIN — MAGNESIUM SULFATE HEPTAHYDRATE 2 G: 2 INJECTION, SOLUTION INTRAVENOUS at 13:10

## 2023-01-08 RX ADMIN — TAZOBACTAM SODIUM AND PIPERACILLIN SODIUM 3.38 G: 375; 3 INJECTION, SOLUTION INTRAVENOUS at 01:37

## 2023-01-08 RX ADMIN — Medication 5 MG: at 23:30

## 2023-01-08 RX ADMIN — Medication 10 ML: at 09:11

## 2023-01-08 RX ADMIN — SODIUM CHLORIDE, POTASSIUM CHLORIDE, SODIUM LACTATE AND CALCIUM CHLORIDE 75 ML/HR: 600; 310; 30; 20 INJECTION, SOLUTION INTRAVENOUS at 01:46

## 2023-01-08 NOTE — PROGRESS NOTES
"                    Clinical Nutrition     Patient Name: Nicole Garcia  YOB: 1940  MRN: [id number]  Date of Encounter: 01/08/23 14:13 EST  Admission date: 1/7/2023    Reason for Visit   MST=2 (eating poorly, 2-13# wt loss)    EMR Reviewed: Yes     Diet Nutrition Related History:      Pt admitted d/t jaundice. S/p ERCP. Pt reports UBW of 135# a couple of months ago. Per EMR pt has had a wt loss of 9# (6.4%) within ~ 3 months (insigificant to malnutrition timeframe criteria). Pt reports eating ~75% of usual intakes over the past 2 months. NKFA or difficulty chewing/swallowing were reported. Epic had lactose intolerance listed in food allergies. Will add Boost Plus BID.    Anthropometrics   Height: Height: 157.5 cm (62\")  Admission Weight:   Flowsheet Rows    Flowsheet Row First Filed Value   Admission Height 157.5 cm (62\") Documented at 01/07/2023 2352   Admission Weight 59.1 kg (130 lb 3.2 oz) Documented at 01/07/2023 2352        Last Filed Weight:Weight: 59 kg (130 lb) (01/08/23 0958) (no wt method)  BMI: BMI (Calculated): 23.8  BMI classification: Normal: 18.5-24.9kg/m2   IBW: Ideal Body Weight (IBW) (kg): 50.43  EMR wts:  Weight Weight (kg) Weight (lbs) Weight Method VISIT REPORT   1/8/2023 58.968 kg 130 lb - -   1/7/2023 59.058 kg 130 lb 3.2 oz Standing scale -   1/7/2023 58.968 kg 130 lb Stated -   1/7/2023 58.968 kg 130 lb Standing scale -   10/17/2022 63.05 kg 139 lb - Report   10/11/2021 62.596 kg 138 lb - Report   6/30/2021 62.596 kg 138 lb - Report     UBW: 135# per pt a couple of months ago  Weight change: 9# wt loss  % change:  6.4%  Time frame:  Within ~ 3 months     Current Nutrition Prescription     NPO Diet NPO Type: Ice Chips  Diet: Liquid Diets; Clear Liquid; Texture: Regular Texture (IDDSI 7); Fluid Consistency: Thin (IDDSI 0)  Diet: Regular/House Diet; Texture: Regular Texture (IDDSI 7); Fluid Consistency: Thin (IDDSI 0)    ONS: Will order Boost Plus BID (Chocolate)    Average Intake " from Charting: No intakes recorded at this time    Intake History:     Intake Category  <=75%     >= 1 MONTH    Actions   Appropriateness for MSA:  Criteria for further malnutrition exam not met at this time. Follow per protocol.     Interventions:   Follow treatment progress, Care plan reviewed, Interview for preferences, Menu provided, Encourage intake, Supplement provided    Karo Arthur,   Time Spent: 20 min

## 2023-01-08 NOTE — PLAN OF CARE
Problem: Adult Inpatient Plan of Care  Goal: Plan of Care Review  Outcome: Ongoing, Progressing  Flowsheets (Taken 1/8/2023 0337)  Plan of Care Reviewed With: patient  Outcome Evaluation: Pt arrived to the unit VSS. NPO possible ERCP 1/8. No c/o pain.  Goal: Patient-Specific Goal (Individualized)  Outcome: Ongoing, Progressing  Goal: Absence of Hospital-Acquired Illness or Injury  Outcome: Ongoing, Progressing  Intervention: Identify and Manage Fall Risk  Recent Flowsheet Documentation  Taken 1/8/2023 0000 by Sheri Carrillo RN  Safety Promotion/Fall Prevention:   assistive device/personal items within reach   clutter free environment maintained   safety round/check completed  Intervention: Prevent Skin Injury  Recent Flowsheet Documentation  Taken 1/8/2023 0000 by Sheri Carrillo RN  Skin Protection:   adhesive use limited   tubing/devices free from skin contact  Intervention: Prevent and Manage VTE (Venous Thromboembolism) Risk  Recent Flowsheet Documentation  Taken 1/8/2023 0000 by Sheri Carrillo RN  Range of Motion: active ROM (range of motion) encouraged  Intervention: Prevent Infection  Recent Flowsheet Documentation  Taken 1/8/2023 0000 by Sheri Carrillo RN  Infection Prevention: rest/sleep promoted  Goal: Optimal Comfort and Wellbeing  Outcome: Ongoing, Progressing  Intervention: Provide Person-Centered Care  Recent Flowsheet Documentation  Taken 1/8/2023 0000 by Sheri Carrillo RN  Trust Relationship/Rapport:   care explained   thoughts/feelings acknowledged   questions answered  Goal: Readiness for Transition of Care  Outcome: Ongoing, Progressing  Intervention: Mutually Develop Transition Plan  Recent Flowsheet Documentation  Taken 1/8/2023 0004 by Sheri Carrillo RN  Transportation Anticipated: family or friend will provide  Patient/Family Anticipated Services at Transition: none  Patient/Family Anticipates Transition to: home  Taken 1/8/2023 0002 by Sheri Carrillo RN  Equipment  Currently Used at Home: walker, standard   Goal Outcome Evaluation:  Plan of Care Reviewed With: patient           Outcome Evaluation: Pt arrived to the unit VSS. NPO possible ERCP 1/8. No c/o pain.

## 2023-01-08 NOTE — ANESTHESIA PROCEDURE NOTES
Airway  Urgency: elective    Date/Time: 1/8/2023 11:21 AM  Airway not difficult    General Information and Staff    Patient location during procedure: OR  CRNA/CAA: Linda Elkins CRNA    Indications and Patient Condition  Indications for airway management: airway protection    Preoxygenated: yes  MILS not maintained throughout  Mask difficulty assessment: 1 - vent by mask    Final Airway Details  Final airway type: endotracheal airway      Successful airway: ETT  Cuffed: yes   Successful intubation technique: video laryngoscopy  Facilitating devices/methods: Bougie and intubating stylet  Endotracheal tube insertion site: oral  Blade: Grande  Blade size: 3  ETT size (mm): 6.5  Cormack-Lehane Classification: grade III - view of epiglottis only  Placement verified by: chest auscultation and capnometry   Cuff volume (mL): 5  Measured from: lips  ETT/EBT  to lips (cm): 20  Number of attempts at approach: 3 or more  Assessment: lips, teeth, and gum same as pre-op and atraumatic intubation    Additional Comments  Negative epigastric sounds, Breath sound equal bilaterally with symmetric chest rise and fall  PATIENT EXTREMELY ANTERIOR WITH LITTLE TO NONE NECK MOVEMENT

## 2023-01-08 NOTE — ANESTHESIA POSTPROCEDURE EVALUATION
Patient: Nicole Garcia    Procedure Summary     Date: 01/08/23 Room / Location: [facility] ENDOSCOPY 3 /  [facility]    Anesthesia Start: 1109 Anesthesia Stop: 1240    Procedure: ENDOSCOPIC RETROGRADE CHOLANGIOPANCREATOGRAPHY Diagnosis:     Surgeons: Darius Obrien MD Provider: Denis Yen MD    Anesthesia Type: general ASA Status: 3 - Emergent          Anesthesia Type: general    Vitals  Vitals Value Taken Time   BP     Temp     Pulse 91 01/08/23 1239   Resp     SpO2 95 % 01/08/23 1239   Vitals shown include unvalidated device data.        Post Anesthesia Care and Evaluation    Patient location during evaluation: PACU  Patient participation: complete - patient participated  Level of consciousness: awake and alert  Pain management: adequate    Airway patency: patent  Anesthetic complications: No anesthetic complications  PONV Status: none  Cardiovascular status: hemodynamically stable and acceptable  Respiratory status: nonlabored ventilation, acceptable and nasal cannula  Hydration status: acceptable

## 2023-01-08 NOTE — POST-PROCEDURE NOTE
ERCP- difficult access due to limited mobility and position.  Dr Bennett was called to assist.  He found a 3 cm malignant appearing stricture with dilation of CBD above. He gained access, obtained brushings and placed a 10 mm X 60 mm Wallstent.    REC Await brushings  Consult Hem onc in am

## 2023-01-08 NOTE — OP NOTE
ERCP summary  See ERCP report for details    Distal common bile duct stricture identified.  Brushed for cytology.  After sphincterotomy, 10 mm x 60 mm fully covered biliary stent was deployed with excellent drainage of bile.    Impression  Distal common bile duct stricture, consistent with malignancy, status post sphincterotomy, brushing and stent placement    Recommendation  Await cytology report

## 2023-01-08 NOTE — PROGRESS NOTES
[facility]  ADMISSION FOLLOW-UP NOTE          Patient admitted after midnight, H&P by my partner performed earlier on today's date reviewed.  Interim findings, labs, and charting also reviewed.        [facility] Problem List has been managed and updated to include any new diagnoses:  Active Hospital Problems    Diagnosis  POA   • **Painless jaundice [R17]  Yes   • Asthma [J45.909]  Yes   • Hypokalemia [E87.6]  Yes   • Elevated liver enzymes [R74.8]  Yes   • Acute UTI [N39.0]  Yes   • Hyponatremia [E87.1]  Yes   • Hypertension [I10]  Yes      Resolved Hospital Problems   No resolved problems to display.     Discussed patient's care with Dr. Obrien who is on-call today.  She is n.p.o. for possible ERCP today.  Reviewed labs.  Will order echo in anticipation of pending procedure.  She may need anesthesia.    ADDITIONAL PLAN:  - detailed assessment and plan from admission reviewed  -Echo ordered    Expected Discharge pending procedure       Shaina Frank MD  01/08/23

## 2023-01-08 NOTE — ANESTHESIA PREPROCEDURE EVALUATION
Anesthesia Evaluation                  Airway   Mallampati: I  TM distance: >3 FB  Neck ROM: full  No difficulty expected  Dental      Pulmonary    (+) asthma,  Cardiovascular     ECG reviewed    (+) hypertension,       Neuro/Psych  GI/Hepatic/Renal/Endo      Musculoskeletal     Abdominal    Substance History      OB/GYN          Other   arthritis,                      Anesthesia Plan    ASA 3 - emergent     general     intravenous induction     Anesthetic plan, risks, benefits, and alternatives have been provided, discussed and informed consent has been obtained with: patient.    Plan discussed with CRNA.        CODE STATUS:    Level Of Support Discussed With: Patient  Code Status (Patient has no pulse and is not breathing): CPR (Attempt to Resuscitate)  Medical Interventions (Patient has pulse or is breathing): Full Support

## 2023-01-08 NOTE — CONSULTS
[facility] [place] [facility] Consult    Referring Provider: Rona Quintana MD    PCP: Zaida Marrero APRN    Reason for Consultation: ERCP    Chief complaint: Painless jaundice    History of present illness:    Nicole Garcia is a 82 y.o. female who is admitted with painless jaundice.  GI consult received for need of ERCP.  Patient notes that she has felt generally poor for the past 2 weeks with worsening weakness and fatigue and loss of appetite with unintentional weight loss of approximately 5 or 6 pounds patient notes that on Thursday she noticed she had return slightly yellow prompting her to seek medical attention outside facility.  Does not report any N/V/D, ABD pain, SOB, CP, or F/C, the patient thinks she may have had a fever initially but did not take her temperature.  At time of admission, CT of abdomen and pelvis is concerning for pancreatic head mass and possible cholangitis with LFTs elevated in cholestatic/obstructive pattern.  WBC is elevated 11.94 at time of admission. Past medical, surgical, social, and family histories are reviewed for accuracy.  No documented alleviating or exacerbating factors.  Does not endorse pain at time of exam.    Allergies:  Lactose intolerance (gi), Aspirin, and Procaine    Scheduled Meds:  piperacillin-tazobactam, 3.375 g, Intravenous, Q8H  sodium chloride, 10 mL, Intravenous, Q12H         Infusions:  lactated ringers, 75 mL/hr, Last Rate: 75 mL/hr (01/08/23 0146)        PRN Meds:  •  ipratropium-albuterol  •  magnesium sulfate **OR** magnesium sulfate **OR** magnesium sulfate  •  melatonin  •  ondansetron  •  ondansetron  •  sodium chloride  •  sodium chloride    Home Meds:  Medications Prior to Admission   Medication Sig Dispense Refill Last Dose   • Lifitegrast (Xiidra) 5 % ophthalmic solution Xiidra 5 % eye drops in a dropperette   1/7/2023 at 0900   • Calcium Carb-Cholecalciferol (Oyster Shell Calcium w/D) 500-5 MG-MCG tablet Take 1 tablet by mouth Daily.   More  than a month   • lactase (Lactaid) 3000 units tablet Take 1 tablet by mouth 3 (Three) Times a Day With Meals. 90 tablet 11 More than a month   • methylPREDNISolone (MEDROL) 4 MG dose pack Take as directed on package instructions. 21 tablet 0 More than a month   • omeprazole (priLOSEC) 20 MG capsule Take 20 mg by mouth Daily.   1/6/2023 at 1400   • triamterene-hydrochlorothiazide (MAXZIDE-25) 37.5-25 MG per tablet TAKE ONE TABLET BY MOUTH EVERY DAY 90 tablet 2 1/6/2023 at 1400       ROS: Review of Systems   Constitutional: Positive for activity change, appetite change, fever and unexpected weight change. Negative for chills, diaphoresis and fatigue.   HENT: Negative for sore throat, trouble swallowing and voice change.    Eyes: Negative.    Respiratory: Negative for apnea, cough, choking, chest tightness, shortness of breath, wheezing and stridor.    Cardiovascular: Negative for chest pain, palpitations and leg swelling.   Gastrointestinal: Negative for abdominal distention, abdominal pain, anal bleeding, blood in stool, constipation, diarrhea, nausea, rectal pain and vomiting.   Endocrine: Negative.    Genitourinary:        +dark urine   Musculoskeletal: Negative.    Skin: Positive for color change.   Allergic/Immunologic: Negative.    Neurological: Negative.    Hematological: Negative.    Psychiatric/Behavioral: Negative.    All other systems reviewed and are negative.      PAST MED HX:  Past Medical History:   Diagnosis Date   • Arthritis    • Asthma    • Hypertension        PAST SURG HX:  Past Surgical History:   Procedure Laterality Date   • APPENDECTOMY     • BREAST SURGERY      cyst removed   • SKIN CANCER EXCISION     • TONSILLECTOMY     • TUBAL ABDOMINAL LIGATION         FAM HX:  Family History   Problem Relation Age of Onset   • Kidney disease Mother    • Asthma Mother    • Kidney disease Father    • Kidney disease Sister    • Asthma Sister    • Kidney disease Brother    • Asthma Brother    • Asthma Son   "      SOC HX:  Social History     Socioeconomic History   • Marital status:    Tobacco Use   • Smoking status: Never   • Smokeless tobacco: Never   Vaping Use   • Vaping Use: Never used   Substance and Sexual Activity   • Alcohol use: Never   • Drug use: Never   • Sexual activity: Defer       PHYSICAL EXAM  /63 (BP Location: Right arm, Patient Position: Lying)   Pulse 71   Temp 99.2 °F (37.3 °C) (Temporal)   Resp 18   Ht 157.5 cm (62\")   Wt 59 kg (130 lb)   SpO2 93%   BMI 23.78 kg/m²   Wt Readings from Last 3 Encounters:   01/08/23 59 kg (130 lb)   01/07/23 59 kg (130 lb)   10/17/22 63 kg (139 lb)   ,body mass index is 23.78 kg/m².  Physical Exam  Vitals and nursing note reviewed.   Constitutional:       General: She is not in acute distress.     Appearance: Normal appearance. She is normal weight. She is ill-appearing. She is not toxic-appearing.   HENT:      Head: Normocephalic and atraumatic.      Mouth/Throat:      Mouth: Mucous membranes are moist.      Pharynx: Oropharynx is clear. No oropharyngeal exudate.   Eyes:      General: Scleral icterus present.      Extraocular Movements: Extraocular movements intact.      Pupils: Pupils are equal, round, and reactive to light.   Cardiovascular:      Rate and Rhythm: Normal rate and regular rhythm.      Pulses: Normal pulses.      Heart sounds: Normal heart sounds.   Pulmonary:      Effort: Pulmonary effort is normal. No respiratory distress.      Breath sounds: Normal breath sounds.   Abdominal:      General: Abdomen is flat. Bowel sounds are normal. There is no distension.      Palpations: Abdomen is soft. There is no mass.      Tenderness: There is no abdominal tenderness. There is no guarding or rebound.      Hernia: No hernia is present.   Genitourinary:     Comments: defer  Musculoskeletal:         General: Normal range of motion.      Cervical back: Normal range of motion and neck supple. No rigidity or tenderness.      Right lower leg: No " edema.      Left lower leg: No edema.   Lymphadenopathy:      Cervical: No cervical adenopathy.   Skin:     General: Skin is warm and dry.      Capillary Refill: Capillary refill takes less than 2 seconds.      Coloration: Skin is jaundiced. Skin is not pale.   Neurological:      General: No focal deficit present.      Mental Status: She is alert and oriented to person, place, and time.   Psychiatric:         Mood and Affect: Mood normal.         Behavior: Behavior normal.         Thought Content: Thought content normal.         Judgment: Judgment normal.         Results Review:   I reviewed the patient's new clinical results.  I reviewed the patient's new imaging results and agree with the interpretation.  I personally viewed and interpreted the patient's EKG/Telemetry data    Lab Results   Component Value Date    WBC 10.47 01/08/2023    HGB 11.3 (L) 01/08/2023    HGB 13.9 01/07/2023    HGB 13.6 10/11/2021    HCT 32.9 (L) 01/08/2023    MCV 85.0 01/08/2023     01/08/2023       No results found for: INR    Lab Results   Component Value Date    GLUCOSE 106 (H) 01/08/2023    BUN 9 01/08/2023    CREATININE 0.73 01/08/2023    EGFRIFNONA 71 10/11/2021    EGFRIFAFRI 85 10/11/2021    BCR 12.3 01/08/2023     01/08/2023    K 3.6 01/08/2023    CO2 25.0 01/08/2023    CALCIUM 8.7 01/08/2023    PROTENTOTREF 7.5 10/11/2021    ALBUMIN 3.1 (L) 01/08/2023    ALKPHOS 424 (H) 01/08/2023    BILITOT 9.9 (H) 01/08/2023     (H) 01/08/2023     (H) 01/08/2023       Adult Transthoracic Echo Complete W/ Cont if Necessary Per Protocol    Result Date: 1/8/2023  •  Left ventricular wall thickness is consistent with mild concentric hypertrophy. •  There is calcification of the aortic valve. •  Moderate tricuspid valve regurgitation is present. •  Estimated right ventricular systolic pressure from tricuspid regurgitation is mildly elevated (35-45 mmHg). •  Normal LV systolic function •  Mild AI; no AS     CT Abdomen  Pelvis With Contrast    Result Date: 1/7/2023  EXAM: CT ABDOMEN PELVIS W CONTRAST-  INDICATION: painless jaundice  TECHNIQUE:  Thin section axial images were obtained from the lung bases to the pubic symphysis following IV contrast administration.  Coronal reconstruction images were obtained from the axial data.  COMPARISON: None.  FINDINGS:  ABDOMEN: The lung bases are clear.   No focal liver lesion is identified. There is intrahepatic biliary ductal dilatation.  The gallbladder is distended. There may be small gallstones. There is focal thickening of the fundus of the gallbladder. The common bile duct is dilated to 16 mm (coronal image 41). There is increased density in the distal duct on CT. Discrete stones are not identified. This increased density could be due to abnormal soft tissue, small layering stones, or a stricture.   The spleen, right adrenal gland, and pancreas are without acute abnormality. There is a small left adrenal nodule.   The kidneys are within normal limits.  There is no hydronephrosis, mass or perinephric stranding.   There is a large hiatal hernia. A small duodenal diverticulum is present. There is no small bowel obstruction.  There is no lymphadenopathy or ascites. Atherosclerotic disease is noted.  PELVIS: The uterus is present.   The appendix is not identified. There is a small round collection of air along the posterior wall of the cecum. Given that there is pandiverticulosis, this is favored to be a diverticulum with a very thin wall.. There are areas of colonic wall thickening which are favored to be related to incomplete distention. There is no pelvic lymphadenopathy or pelvic ascites. However, there is soft tissue air in the extraperitoneal region of the pelvis, inferior to the urinary bladder. There is a small amount of subcutaneous air in both groins. Etiology for this air is unclear. Mild compression deformity of L2 is of age indeterminant..  Scratch      1. Intrahepatic and  extrahepatic biliary ductal dilatation to the level of the distal common duct where there is abnormal soft tissue density which could be related to a stricture, neoplastic process, or subtle small stones in the distal duct. ERCP is recommended.  2. Abnormal gallbladder with focal thickening of the fundus of the gallbladder as well as probable small layering stones. Adenomyomatosis or neoplasm of the gallbladder not excluded.  3. Abnormal extraperitoneal air in the pelvis of uncertain etiology. No history of recent surgery was provided. Please correlate with any recent instrumentation. No associated free intraperitoneal air.        351.30 mGy.cm    This study was performed with techniques to keep radiation doses as low as reasonably achievable (ALARA). Individualized dose reduction techniques using automated exposure control or adjustment of mA and/or kV according to the patient size were employed.  This report was signed and finalized on 1/7/2023 3:07 PM by Alexandria Raygoza MD.    XR Chest 1 View    Result Date: 1/7/2023  PROCEDURE: XR CHEST 1 VW-  INDICATION:  Weak/Dizzy/AMS triage protocol  FINDINGS:  A portable view of the chest was obtained.  Comparison is made to a prior exam dated 06/30/2021.   Cardiac and mediastinal silhouettes are normal. Retrocardiac opacity is likely a hiatal hernia. The lungs are clear. There is no pleural effusion or pneumothorax.      No acute cardiac or pulmonary disease on this portable exam. Hiatal hernia.  This report was signed and finalized on 1/7/2023 3:21 PM by Alexandria Raygoza MD.    No results found for: COVID19    ASSESSMENTS/PLANS  1.  Pancreatic head mass, noted on CT imaging  2.  Painless jaundice, related to above  3.  Elevated LFTs, hyperbilirubinemia, related to above  4.  UTI, POA  5.  Anorexia and change in appetite, unintentional weight loss    Nicole Garcia is a 82 y.o. female presenting to hospital after noticing significant jaundice.  CT imaging reviewed which is  concerning for a pancreatic head mass with LFTs and obstructive pattern.  Recommend ERCP with brushings and biliary Wallstent today; if unable to access biliary system via ERCP, will need PTC tomorrow.  We will also obtain a CA 19-9.    >>> N.p.o.  >>> Obtain informed consent for endoscopic retrograde cholangiopancreatography with biliary brushings and biliary Wallstent  >>> Risk and benefits explained including incomplete cannulation, 10%; Perforation, 1/ 500; Bleeding, 1/500:  Infection; Pancreatitis, 5 to 10% mild to moderate; and 5% severe with 1/1000 risk of death.  >>> Order placed for preprocedural indomethacin  >>> Order placed for intraprocedural fluoroscopy  >>> Is currently on Zosyn which provides adequate antimicrobial coverage for planned procedure  >>> CA 19-9 pending    I discussed the patient's findings and my recommendations with patient, family and nursing staff.    Franco Amezcua, APRN  01/08/23  11:06 EST

## 2023-01-08 NOTE — H&P
"    [facility] Medicine Services  HISTORY AND PHYSICAL    Patient Name: Nicole Garcia  : 1940  MRN: [id number]  Primary Care Physician: Zaida Marrero APRN  Date of admission: 2023    Subjective   Subjective     Chief Complaint:  jaundice    HPI:  Nicole Garcia is an 82 y.o. female with a past medical history significant for hypertension, asthma, and arthritis. She presents today as a transfer from [facility] after being evaluated for color change. Patient volunteers that she has been \"yellow\" for approximately 4-5 days. Increasingly worse. Notes associated loss off appetite with decreased PO intake and unintentional weight loss of approximately 5 pounds over the past week. Also reports subjective fever although she has not taken her temperature. Was concerned and presented to ED for further evaluation and treatment. Labs at OSH demonstrated hypokalemia, elevated liver enzymes, elevated procalcitonin, and possible UTI. CT A/P shows  pancreatic mass, cholangitis, cholelithiasis, cholecystitis among other etiologies. Patient was subsequently transferred to [facility] for higher level of care.  Currently there are no complaints cough, congestion, SOB, or chest pain. No abdominal pain or N/V/D. No diarrhea, constipation. No dysuria, or flank pain.        Review of Systems   Constitutional: Negative for chills, fatigue and fever.   HENT: Negative for congestion and trouble swallowing.    Eyes: Negative for photophobia and visual disturbance.   Respiratory: Negative for cough and shortness of breath.    Cardiovascular: Negative for chest pain and leg swelling.   Gastrointestinal: Negative for abdominal pain, diarrhea and nausea.   Endocrine: Negative for cold intolerance and heat intolerance.   Genitourinary: Negative for dysuria and flank pain.   Musculoskeletal: Negative for back pain and gait problem.   Skin: Positive for color change. Negative for pallor and rash. "   Allergic/Immunologic: Positive for immunocompromised state.   Neurological: Negative for weakness and headaches.   Hematological: Negative for adenopathy.   Psychiatric/Behavioral: Negative for agitation and confusion.        All other systems reviewed and are negative.     Personal History     Past Medical History:   Diagnosis Date   • Arthritis    • Asthma    • Hypertension              Past Surgical History:   Procedure Laterality Date   • APPENDECTOMY     • BREAST SURGERY      cyst removed   • SKIN CANCER EXCISION     • TONSILLECTOMY     • TUBAL ABDOMINAL LIGATION         Family History:  family history includes Asthma in her brother, mother, sister, and son; Kidney disease in her brother, father, mother, and sister. Otherwise pertinent FHx was reviewed and unremarkable.     Social History:  reports that she has never smoked. She has never used smokeless tobacco. She reports that she does not drink alcohol and does not use drugs.  Social History     Social History Narrative   • Not on file       Medications:  Lifitegrast, Oyster Shell Calcium w/D, lactase, methylPREDNISolone, omeprazole, and triamterene-hydrochlorothiazide    Allergies   Allergen Reactions   • Lactose Intolerance (Gi) GI Intolerance   • Aspirin Hives   • Procaine Hives       Objective   Objective     Vital Signs:   Temp:  [97.2 °F (36.2 °C)-99.2 °F (37.3 °C)] 99.2 °F (37.3 °C)  Heart Rate:  [71-87] 79  Resp:  [18] 18  BP: (129-177)/() 134/84    Physical Exam   Constitutional: Awake, alert  Eyes: PERRLA, sclerae anicteric, no conjunctival injection  HENT: NCAT, mucous membranes moist  Neck: Supple, no thyromegaly, no lymphadenopathy, trachea midline  Respiratory: Clear to auscultation bilaterally, nonlabored respirations   Cardiovascular: RRR, no murmurs, rubs, or gallops, palpable pedal pulses bilaterally  Gastrointestinal: Positive bowel sounds, soft, nontender, nondistended  Musculoskeletal: No bilateral ankle edema, no clubbing or  cyanosis to extremities  Psychiatric: Appropriate affect, cooperative  Neurologic: Oriented x 3, strength symmetric in all extremities, Cranial Nerves grossly intact to confrontation, speech clear  Skin: jaundice      Result Review:  I have personally reviewed the results from the time of this admission to 1/8/2023 00:26 EST and agree with these findings:  [x]  Laboratory list / accordion  []  Microbiology  []  Radiology  []  EKG/Telemetry   []  Cardiology/Vascular   []  Pathology  [x]  Old records  []  Other:  Most notable findings include: vitals stable. Sodium 132. Potassium 3.0. alk phos 544. . . WBC 11.9. ua concerning for possible UTI. Imaging noted above    LAB RESULTS:      Lab 01/07/23  1540 01/07/23  1352   WBC  --  11.94*   HEMOGLOBIN  --  13.9   HEMATOCRIT  --  40.7   PLATELETS  --  501*   NEUTROS ABS  --  10.01*   IMMATURE GRANS (ABS)  --  0.16*   LYMPHS ABS  --  1.16   MONOS ABS  --  0.48   EOS ABS  --  0.08   MCV  --  83.4   PROCALCITONIN  --  0.45*   LACTATE 1.5  --          Lab 01/07/23  1352   SODIUM 132*   POTASSIUM 3.0*   CHLORIDE 91*   CO2 25.6   ANION GAP 15.4*   BUN 11   CREATININE 0.85   EGFR 67.7   GLUCOSE 115*   CALCIUM 10.1   MAGNESIUM 1.5*         Lab 01/07/23  1352   TOTAL PROTEIN 8.1   ALBUMIN 4.0   GLOBULIN 4.1   ALT (SGPT) 311*   AST (SGOT) 468*   BILIRUBIN 9.7*   ALK PHOS 544*         Lab 01/07/23  1352   TROPONIN T <0.010                 Brief Urine Lab Results  (Last result in the past 365 days)      Color   Clarity   Blood   Leuk Est   Nitrite   Protein   CREAT   Urine HCG        01/07/23 1622 Dark Yellow   Cloudy   Negative   Small (1+)   Positive   30 mg/dL (1+)               Microbiology Results (last 10 days)     ** No results found for the last 240 hours. **          CT Abdomen Pelvis With Contrast    Result Date: 1/7/2023  EXAM: CT ABDOMEN PELVIS W CONTRAST-  INDICATION: painless jaundice  TECHNIQUE:  Thin section axial images were obtained from the lung bases  to the pubic symphysis following IV contrast administration.  Coronal reconstruction images were obtained from the axial data.  COMPARISON: None.  FINDINGS:  ABDOMEN: The lung bases are clear.   No focal liver lesion is identified. There is intrahepatic biliary ductal dilatation.  The gallbladder is distended. There may be small gallstones. There is focal thickening of the fundus of the gallbladder. The common bile duct is dilated to 16 mm (coronal image 41). There is increased density in the distal duct on CT. Discrete stones are not identified. This increased density could be due to abnormal soft tissue, small layering stones, or a stricture.   The spleen, right adrenal gland, and pancreas are without acute abnormality. There is a small left adrenal nodule.   The kidneys are within normal limits.  There is no hydronephrosis, mass or perinephric stranding.   There is a large hiatal hernia. A small duodenal diverticulum is present. There is no small bowel obstruction.  There is no lymphadenopathy or ascites. Atherosclerotic disease is noted.  PELVIS: The uterus is present.   The appendix is not identified. There is a small round collection of air along the posterior wall of the cecum. Given that there is pandiverticulosis, this is favored to be a diverticulum with a very thin wall.. There are areas of colonic wall thickening which are favored to be related to incomplete distention. There is no pelvic lymphadenopathy or pelvic ascites. However, there is soft tissue air in the extraperitoneal region of the pelvis, inferior to the urinary bladder. There is a small amount of subcutaneous air in both groins. Etiology for this air is unclear. Mild compression deformity of L2 is of age indeterminant..  Scratch      Impression: 1. Intrahepatic and extrahepatic biliary ductal dilatation to the level of the distal common duct where there is abnormal soft tissue density which could be related to a stricture, neoplastic  process, or subtle small stones in the distal duct. ERCP is recommended.  2. Abnormal gallbladder with focal thickening of the fundus of the gallbladder as well as probable small layering stones. Adenomyomatosis or neoplasm of the gallbladder not excluded.  3. Abnormal extraperitoneal air in the pelvis of uncertain etiology. No history of recent surgery was provided. Please correlate with any recent instrumentation. No associated free intraperitoneal air.        351.30 mGy.cm    This study was performed with techniques to keep radiation doses as low as reasonably achievable (ALARA). Individualized dose reduction techniques using automated exposure control or adjustment of mA and/or kV according to the patient size were employed.  This report was signed and finalized on 1/7/2023 3:07 PM by Alexandria Raygoza MD.    XR Chest 1 View    Result Date: 1/7/2023  PROCEDURE: XR CHEST 1 VW-  INDICATION:  Weak/Dizzy/AMS triage protocol  FINDINGS:  A portable view of the chest was obtained.  Comparison is made to a prior exam dated 06/30/2021.   Cardiac and mediastinal silhouettes are normal. Retrocardiac opacity is likely a hiatal hernia. The lungs are clear. There is no pleural effusion or pneumothorax.      Impression: No acute cardiac or pulmonary disease on this portable exam. Hiatal hernia.  This report was signed and finalized on 1/7/2023 3:21 PM by Alexandria Raygoza MD.          Assessment & Plan   Assessment & Plan       Painless jaundice    Acute UTI    Hyponatremia    Hypertension    Hypokalemia    Elevated liver enzymes    Asthma     82 y.o. female with a past medical history significant for hypertension, asthma, and arthritis. She presents today as a transfer from [facility] after being evaluated for color change/jaundice.     Painless Jaundice  - elevated liver enzymes  - imaging concerning for pancreatic mass, cholangitis, cholelithiasis, cholecystitis among other etiologies.  - NPO  - IVF overnight  -  "blood cultures  - started on Zosyn. continue  - consult to GI for ERCP  - avoid hepatotoxins  - close monitor on telemetry  - pain and nausea control  - am labs    Acute UTI  - urine culture  - continue with zosyn  - no history of resistant tomás    Hypokalemia  - no acute EKG changes  - replace as needed per protocol  - check magnesium    Asthma  - duo nebs with additional pulmonary toilet as needed    DVT prophylaxis: mehanical    CODE STATUS:  Full code  Level Of Support Discussed With: Patient  Code Status (Patient has no pulse and is not breathing): CPR (Attempt to Resuscitate)  Medical Interventions (Patient has pulse or is breathing): Full Support      Expected Discharge   TBD    Electronically signed by Francheska White PA-C, 01/08/23, 1:38 AM EST.      This note has been completed as part of a split-shared workflow.       Attending   Admission Attestation       I have performed an independent face-to-face diagnostic evaluation including performing an independent physical examination as documented here.  The documented plan of care above was reviewed and developed with the advanced practice clinician (APC).      Brief Summary Statement:   Nicole Garcia is a 82 y.o. female who was sent here as a direct admission from [facility] (Saturday).  The patient states she has noticed yellow coloration of her face and upper extremities over the last 5 days.  She states it is gotten worse over that time frame.  She also adds that she has lost approximately 5 pounds over the last 7 days, but she also confirms decreased oral intake of food and fluids, noting that she \"has had no appetite at all.\"  At the facility in [place], she was hypokalemic with elevated LFTs, and imaging obtained showed a pancreatic mass, leading to her transfer here [facility].  She states she is not in any pain and has not had any pain over the last week.  She denies chest pain, shortness of breath, fever/chills, nausea/emesis, " bowel habit change, focal neurologic deficit, or syncope.    Remainder of detailed HPI is as noted by APC and has been reviewed and/or edited by me for completeness.    Attending Physical Exam:  Temp:  [97.2 °F (36.2 °C)-99.2 °F (37.3 °C)] 99.2 °F (37.3 °C)  Heart Rate:  [71-87] 79  Resp:  [18] 18  BP: (129-177)/() 134/84    Constitutional: Awake, alert, NAD, very pleasant.  Eyes: PERRLA, sclerae anicteric, no conjunctival injection  HENT: NCAT, mucous membranes moist  Neck: Supple, no thyromegaly, no lymphadenopathy, trachea midline  Respiratory: Clear to auscultation bilaterally, nonlabored respirations   Cardiovascular: RRR, no murmurs, rubs, or gallops, palpable pedal pulses bilaterally  Gastrointestinal: Positive bowel sounds, soft, nontender, nondistended  Musculoskeletal: No bilateral ankle edema, no clubbing or cyanosis to extremities  Psychiatric: Appropriate affect, cooperative  Neurologic: Oriented x 3, strength symmetric in all extremities, Cranial Nerves grossly intact to confrontation, speech clear  Skin: No rashes, normal turgor.  There is slight jaundice of the face, more noticeable in the anterior and lateral aspects of her neck as well as the proximal aspect of the bilateral upper extremities.    Brief Assessment/Plan :  See detailed assessment and plan developed with APC which I have reviewed and/or edited for completeness.            Yuri Traore III, DO  01/08/23

## 2023-01-09 ENCOUNTER — APPOINTMENT (OUTPATIENT)
Dept: CT IMAGING | Facility: HOSPITAL | Age: 83
DRG: 445 | End: 2023-01-09
Payer: MEDICARE

## 2023-01-09 PROBLEM — K86.89 PANCREATIC MASS: Status: ACTIVE | Noted: 2023-01-09

## 2023-01-09 LAB
ALBUMIN SERPL-MCNC: 3.2 G/DL (ref 3.5–5.2)
ALBUMIN/GLOB SERPL: 1 G/DL
ALP SERPL-CCNC: 402 U/L (ref 39–117)
ALT SERPL W P-5'-P-CCNC: 188 U/L (ref 1–33)
ANION GAP SERPL CALCULATED.3IONS-SCNC: 14 MMOL/L (ref 5–15)
AST SERPL-CCNC: 189 U/L (ref 1–32)
BACTERIA UR QL AUTO: ABNORMAL /HPF
BILIRUB SERPL-MCNC: 4.8 MG/DL (ref 0–1.2)
BILIRUB UR QL STRIP: ABNORMAL
BUN SERPL-MCNC: 10 MG/DL (ref 8–23)
BUN/CREAT SERPL: 13.7 (ref 7–25)
CALCIUM SPEC-SCNC: 8.6 MG/DL (ref 8.6–10.5)
CHLORIDE SERPL-SCNC: 96 MMOL/L (ref 98–107)
CLARITY UR: ABNORMAL
CO2 SERPL-SCNC: 27 MMOL/L (ref 22–29)
COLOR UR: ABNORMAL
CREAT SERPL-MCNC: 0.73 MG/DL (ref 0.57–1)
DEPRECATED RDW RBC AUTO: 54.5 FL (ref 37–54)
EGFRCR SERPLBLD CKD-EPI 2021: 81.2 ML/MIN/1.73
ERYTHROCYTE [DISTWIDTH] IN BLOOD BY AUTOMATED COUNT: 17.3 % (ref 12.3–15.4)
GLOBULIN UR ELPH-MCNC: 3.2 GM/DL
GLUCOSE SERPL-MCNC: 136 MG/DL (ref 65–99)
GLUCOSE UR STRIP-MCNC: NEGATIVE MG/DL
HCT VFR BLD AUTO: 31.4 % (ref 34–46.6)
HGB BLD-MCNC: 10.4 G/DL (ref 12–15.9)
HGB UR QL STRIP.AUTO: NEGATIVE
HYALINE CASTS UR QL AUTO: ABNORMAL /LPF
KETONES UR QL STRIP: ABNORMAL
LEUKOCYTE ESTERASE UR QL STRIP.AUTO: ABNORMAL
LIPASE SERPL-CCNC: 47 U/L (ref 13–60)
MAGNESIUM SERPL-MCNC: 2.9 MG/DL (ref 1.6–2.4)
MCH RBC QN AUTO: 28.8 PG (ref 26.6–33)
MCHC RBC AUTO-ENTMCNC: 33.1 G/DL (ref 31.5–35.7)
MCV RBC AUTO: 87 FL (ref 79–97)
NITRITE UR QL STRIP: POSITIVE
PH UR STRIP.AUTO: 5.5 [PH] (ref 5–8)
PLATELET # BLD AUTO: 402 10*3/MM3 (ref 140–450)
PMV BLD AUTO: 10.5 FL (ref 6–12)
POTASSIUM SERPL-SCNC: 3.2 MMOL/L (ref 3.5–5.2)
POTASSIUM SERPL-SCNC: 4.4 MMOL/L (ref 3.5–5.2)
PROT SERPL-MCNC: 6.4 G/DL (ref 6–8.5)
PROT UR QL STRIP: NEGATIVE
RBC # BLD AUTO: 3.61 10*6/MM3 (ref 3.77–5.28)
RBC # UR STRIP: ABNORMAL /HPF
REF LAB TEST METHOD: ABNORMAL
SODIUM SERPL-SCNC: 137 MMOL/L (ref 136–145)
SP GR UR STRIP: 1.04 (ref 1–1.03)
SQUAMOUS #/AREA URNS HPF: ABNORMAL /HPF
UROBILINOGEN UR QL STRIP: ABNORMAL
WBC # UR STRIP: ABNORMAL /HPF
WBC NRBC COR # BLD: 13.13 10*3/MM3 (ref 3.4–10.8)

## 2023-01-09 PROCEDURE — 85027 COMPLETE CBC AUTOMATED: CPT | Performed by: INTERNAL MEDICINE

## 2023-01-09 PROCEDURE — 84132 ASSAY OF SERUM POTASSIUM: CPT | Performed by: FAMILY MEDICINE

## 2023-01-09 PROCEDURE — 97165 OT EVAL LOW COMPLEX 30 MIN: CPT

## 2023-01-09 PROCEDURE — 83735 ASSAY OF MAGNESIUM: CPT | Performed by: FAMILY MEDICINE

## 2023-01-09 PROCEDURE — 99223 1ST HOSP IP/OBS HIGH 75: CPT | Performed by: INTERNAL MEDICINE

## 2023-01-09 PROCEDURE — 71260 CT THORAX DX C+: CPT

## 2023-01-09 PROCEDURE — 99233 SBSQ HOSP IP/OBS HIGH 50: CPT | Performed by: FAMILY MEDICINE

## 2023-01-09 PROCEDURE — 25010000002 PIPERACILLIN SOD-TAZOBACTAM PER 1 G: Performed by: INTERNAL MEDICINE

## 2023-01-09 PROCEDURE — 99232 SBSQ HOSP IP/OBS MODERATE 35: CPT | Performed by: PHYSICIAN ASSISTANT

## 2023-01-09 PROCEDURE — 97535 SELF CARE MNGMENT TRAINING: CPT

## 2023-01-09 PROCEDURE — 81001 URINALYSIS AUTO W/SCOPE: CPT | Performed by: FAMILY MEDICINE

## 2023-01-09 PROCEDURE — 80053 COMPREHEN METABOLIC PANEL: CPT | Performed by: INTERNAL MEDICINE

## 2023-01-09 PROCEDURE — 25010000002 IOPAMIDOL 61 % SOLUTION: Performed by: FAMILY MEDICINE

## 2023-01-09 PROCEDURE — 83690 ASSAY OF LIPASE: CPT | Performed by: INTERNAL MEDICINE

## 2023-01-09 PROCEDURE — 25010000002 MORPHINE PER 10 MG: Performed by: INTERNAL MEDICINE

## 2023-01-09 RX ORDER — ALUMINA, MAGNESIA, AND SIMETHICONE 2400; 2400; 240 MG/30ML; MG/30ML; MG/30ML
30 SUSPENSION ORAL EVERY 12 HOURS PRN
Status: DISCONTINUED | OUTPATIENT
Start: 2023-01-09 | End: 2023-01-09

## 2023-01-09 RX ORDER — POTASSIUM CHLORIDE 1.5 G/1.77G
40 POWDER, FOR SOLUTION ORAL AS NEEDED
Status: DISCONTINUED | OUTPATIENT
Start: 2023-01-09 | End: 2023-01-10 | Stop reason: HOSPADM

## 2023-01-09 RX ORDER — LIDOCAINE HYDROCHLORIDE 20 MG/ML
15 SOLUTION OROPHARYNGEAL EVERY 12 HOURS PRN
Status: DISCONTINUED | OUTPATIENT
Start: 2023-01-09 | End: 2023-01-09

## 2023-01-09 RX ORDER — LIDOCAINE HYDROCHLORIDE 20 MG/ML
15 SOLUTION OROPHARYNGEAL EVERY 12 HOURS PRN
Status: DISCONTINUED | OUTPATIENT
Start: 2023-01-09 | End: 2023-01-10 | Stop reason: HOSPADM

## 2023-01-09 RX ORDER — ALUMINA, MAGNESIA, AND SIMETHICONE 2400; 2400; 240 MG/30ML; MG/30ML; MG/30ML
30 SUSPENSION ORAL EVERY 12 HOURS PRN
Status: DISCONTINUED | OUTPATIENT
Start: 2023-01-09 | End: 2023-01-10 | Stop reason: HOSPADM

## 2023-01-09 RX ORDER — MORPHINE SULFATE 4 MG/ML
3 INJECTION, SOLUTION INTRAMUSCULAR; INTRAVENOUS
Status: DISCONTINUED | OUTPATIENT
Start: 2023-01-09 | End: 2023-01-10 | Stop reason: HOSPADM

## 2023-01-09 RX ORDER — POTASSIUM CHLORIDE 750 MG/1
40 CAPSULE, EXTENDED RELEASE ORAL AS NEEDED
Status: DISCONTINUED | OUTPATIENT
Start: 2023-01-09 | End: 2023-01-10 | Stop reason: HOSPADM

## 2023-01-09 RX ORDER — PANTOPRAZOLE SODIUM 40 MG/1
40 TABLET, DELAYED RELEASE ORAL
Status: DISCONTINUED | OUTPATIENT
Start: 2023-01-09 | End: 2023-01-10 | Stop reason: HOSPADM

## 2023-01-09 RX ADMIN — TAZOBACTAM SODIUM AND PIPERACILLIN SODIUM 3.38 G: 375; 3 INJECTION, SOLUTION INTRAVENOUS at 07:26

## 2023-01-09 RX ADMIN — ALUMINUM HYDROXIDE, MAGNESIUM HYDROXIDE, AND DIMETHICONE 30 ML: 400; 400; 40 SUSPENSION ORAL at 19:30

## 2023-01-09 RX ADMIN — MORPHINE SULFATE 3 MG: 4 INJECTION, SOLUTION INTRAMUSCULAR; INTRAVENOUS at 03:30

## 2023-01-09 RX ADMIN — IOPAMIDOL 85 ML: 612 INJECTION, SOLUTION INTRAVENOUS at 13:54

## 2023-01-09 RX ADMIN — Medication 10 ML: at 08:50

## 2023-01-09 RX ADMIN — POTASSIUM CHLORIDE 40 MEQ: 750 CAPSULE, EXTENDED RELEASE ORAL at 12:51

## 2023-01-09 RX ADMIN — POTASSIUM CHLORIDE 40 MEQ: 750 CAPSULE, EXTENDED RELEASE ORAL at 07:26

## 2023-01-09 RX ADMIN — LIDOCAINE HYDROCHLORIDE 15 ML: 20 SOLUTION ORAL; TOPICAL at 19:29

## 2023-01-09 RX ADMIN — PANTOPRAZOLE SODIUM 40 MG: 40 TABLET, DELAYED RELEASE ORAL at 16:53

## 2023-01-09 NOTE — PROGRESS NOTES
[facility] Medicine Services  PROGRESS NOTE    Patient Name: Nicole Garcia  : 1940  MRN: [id number]    Date of Admission: 2023  Primary Care Physician: Zaida Marrero APRN    Subjective   Subjective     CC:  Pancreatic mass    HPI:  Patient is a 82-year-old who was admitted with painless jaundice and found to have pancreatic mass, cholelithiasis and concern for cholangitis/cholecystitis.  She was seen by Dr. Obrien on 2023.  She underwent ERCP with Dr. Obrien and Dr. Ibarra's.  Findings were-3 cm malignant appearing stricture stricture with dilation of common bile duct.  10 mm x 60 mm stent was placed and brushings were sent.  Heme-onc consult is requested for today.  Post procedure the patient complained of heartburn type pain.  Lipase was normal.  GI has started PPI and ordered GI cocktail.    ROS:  Gen- No fevers, chills  CV- No chest pain, palpitations  Resp- No cough, dyspnea  GI- No N/V/D, positive abd pain post procedure         Objective   Objective     Vital Signs:   Temp:  [97.8 °F (36.6 °C)-98.3 °F (36.8 °C)] 98 °F (36.7 °C)  Heart Rate:  [69-90] 71  Resp:  [16-18] 18  BP: (122-154)/(63-75) 122/63  Flow (L/min):  [2] 2     Physical Exam:  Constitutional: No acute distress, awake, alert  HENT: NCAT, mucous membranes moist  Respiratory: Clear to auscultation bilaterally, respiratory effort normal   Cardiovascular: RRR  Gastrointestinal: Positive bowel sounds, soft, mildly tender, nondistended  Musculoskeletal: No bilateral ankle edema  Psychiatric: Appropriate affect, cooperative  Neurologic: Oriented x 3, strength symmetric in all extremities, Cranial Nerves grossly intact to confrontation, speech clear  Skin: Mild jaundice      Results Reviewed:  LAB RESULTS:      Lab 23  0347 23  0342 23  1540 23  1352   WBC 13.13* 10.47  --  11.94*   HEMOGLOBIN 10.4* 11.3*  --  13.9   HEMATOCRIT 31.4* 32.9*  --  40.7   PLATELETS 402 400  --   501*   NEUTROS ABS  --   --   --  10.01*   IMMATURE GRANS (ABS)  --   --   --  0.16*   LYMPHS ABS  --   --   --  1.16   MONOS ABS  --   --   --  0.48   EOS ABS  --   --   --  0.08   MCV 87.0 85.0  --  83.4   PROCALCITONIN  --   --   --  0.45*   LACTATE  --   --  1.5  --          Lab 01/09/23  0347 01/08/23  0342 01/07/23  1352   SODIUM 137 137 132*   POTASSIUM 3.2* 3.6 3.0*   CHLORIDE 96* 98 91*   CO2 27.0 25.0 25.6   ANION GAP 14.0 14.0 15.4*   BUN 10 9 11   CREATININE 0.73 0.73 0.85   EGFR 81.2 81.2 67.7   GLUCOSE 136* 106* 115*   CALCIUM 8.6 8.7 10.1   MAGNESIUM 2.9*  --  1.5*         Lab 01/09/23  0347 01/08/23  0342 01/07/23  1352   TOTAL PROTEIN 6.4 5.8* 8.1   ALBUMIN 3.2* 3.1* 4.0   GLOBULIN 3.2 2.7 4.1   ALT (SGPT) 188* 233* 311*   AST (SGOT) 189* 380* 468*   BILIRUBIN 4.8* 9.9* 9.7*   ALK PHOS 402* 424* 544*   LIPASE 47  --   --          Lab 01/07/23  1352   TROPONIN T <0.010                 Brief Urine Lab Results  (Last result in the past 365 days)      Color   Clarity   Blood   Leuk Est   Nitrite   Protein   CREAT   Urine HCG        01/07/23 1622 Dark Yellow   Cloudy   Negative   Small (1+)   Positive   30 mg/dL (1+)                 Microbiology Results Abnormal     None          Adult Transthoracic Echo Complete W/ Cont if Necessary Per Protocol    Result Date: 1/8/2023  •  Left ventricular wall thickness is consistent with mild concentric hypertrophy. •  There is calcification of the aortic valve. •  Moderate tricuspid valve regurgitation is present. •  Estimated right ventricular systolic pressure from tricuspid regurgitation is mildly elevated (35-45 mmHg). •  Normal LV systolic function •  Mild AI; no AS     CT Abdomen Pelvis With Contrast    Result Date: 1/7/2023  EXAM: CT ABDOMEN PELVIS W CONTRAST-  INDICATION: painless jaundice  TECHNIQUE:  Thin section axial images were obtained from the lung bases to the pubic symphysis following IV contrast administration.  Coronal reconstruction images were  obtained from the axial data.  COMPARISON: None.  FINDINGS:  ABDOMEN: The lung bases are clear.   No focal liver lesion is identified. There is intrahepatic biliary ductal dilatation.  The gallbladder is distended. There may be small gallstones. There is focal thickening of the fundus of the gallbladder. The common bile duct is dilated to 16 mm (coronal image 41). There is increased density in the distal duct on CT. Discrete stones are not identified. This increased density could be due to abnormal soft tissue, small layering stones, or a stricture.   The spleen, right adrenal gland, and pancreas are without acute abnormality. There is a small left adrenal nodule.   The kidneys are within normal limits.  There is no hydronephrosis, mass or perinephric stranding.   There is a large hiatal hernia. A small duodenal diverticulum is present. There is no small bowel obstruction.  There is no lymphadenopathy or ascites. Atherosclerotic disease is noted.  PELVIS: The uterus is present.   The appendix is not identified. There is a small round collection of air along the posterior wall of the cecum. Given that there is pandiverticulosis, this is favored to be a diverticulum with a very thin wall.. There are areas of colonic wall thickening which are favored to be related to incomplete distention. There is no pelvic lymphadenopathy or pelvic ascites. However, there is soft tissue air in the extraperitoneal region of the pelvis, inferior to the urinary bladder. There is a small amount of subcutaneous air in both groins. Etiology for this air is unclear. Mild compression deformity of L2 is of age indeterminant..  Scratch      Impression: 1. Intrahepatic and extrahepatic biliary ductal dilatation to the level of the distal common duct where there is abnormal soft tissue density which could be related to a stricture, neoplastic process, or subtle small stones in the distal duct. ERCP is recommended.  2. Abnormal gallbladder with  focal thickening of the fundus of the gallbladder as well as probable small layering stones. Adenomyomatosis or neoplasm of the gallbladder not excluded.  3. Abnormal extraperitoneal air in the pelvis of uncertain etiology. No history of recent surgery was provided. Please correlate with any recent instrumentation. No associated free intraperitoneal air.        351.30 mGy.cm    This study was performed with techniques to keep radiation doses as low as reasonably achievable (ALARA). Individualized dose reduction techniques using automated exposure control or adjustment of mA and/or kV according to the patient size were employed.  This report was signed and finalized on 1/7/2023 3:07 PM by Alexandria Raygoza MD.    FL ERCP pancreatic and biliary ducts    Result Date: 1/9/2023  FL ERCP PANCREATIC AND BILIARY DUCTS Date of Exam: 1/8/2023 11:11 AM EST Indication: ERCP. Comparison: None available. Technique:  A series of radiographic digital spot films were obtained in conjunction with a endoscopic catheterization of the biliary and pancreatic ductal system, performed by the gastroenterologist. Fluoroscopic Time: 3 minutes 12 seconds Number of Images: 4 Findings: A total of 4 images obtained show endoscope and side cannula placement, contrast injection into the common duct, and subsequent Wallstent placement into common duct. Please see the procedure report for full details.     Impression: Impression: Fluoroscopy provided during ERCP and stent placement. Electronically Signed: Adonis Saxena  1/9/2023 10:20 AM EST  Workstation ID: [id number]    XR Chest 1 View    Result Date: 1/7/2023  PROCEDURE: XR CHEST 1 VW-  INDICATION:  Weak/Dizzy/AMS triage protocol  FINDINGS:  A portable view of the chest was obtained.  Comparison is made to a prior exam dated 06/30/2021.   Cardiac and mediastinal silhouettes are normal. Retrocardiac opacity is likely a hiatal hernia. The lungs are clear. There is no pleural effusion or pneumothorax.       Impression: No acute cardiac or pulmonary disease on this portable exam. Hiatal hernia.  This report was signed and finalized on 1/7/2023 3:21 PM by Alexandria Raygoza MD.      Results for orders placed during the hospital encounter of 01/07/23    Adult Transthoracic Echo Complete W/ Cont if Necessary Per Protocol    Interpretation Summary  •  Left ventricular wall thickness is consistent with mild concentric hypertrophy.  •  There is calcification of the aortic valve.  •  Moderate tricuspid valve regurgitation is present.  •  Estimated right ventricular systolic pressure from tricuspid regurgitation is mildly elevated (35-45 mmHg).  •  Normal LV systolic function  •  Mild AI; no AS      I have reviewed the medications:  Scheduled Meds:pantoprazole, 40 mg, Oral, BID AC  piperacillin-tazobactam, 3.375 g, Intravenous, Q8H  sodium chloride, 10 mL, Intravenous, Q12H      Continuous Infusions:   PRN Meds:.•  acetaminophen  •  GI cocktail  •  HYDROcodone-acetaminophen  •  magnesium sulfate **OR** magnesium sulfate **OR** magnesium sulfate  •  melatonin  •  Morphine  •  ondansetron  •  phenol  •  potassium chloride  •  potassium chloride  •  sodium chloride  •  sodium chloride    Assessment & Plan   Assessment & Plan     Active Hospital Problems    Diagnosis  POA   • **Painless jaundice [R17]  Yes   • Asthma [J45.909]  Yes   • Hypokalemia [E87.6]  Yes   • Elevated liver enzymes [R74.8]  Yes   • Acute UTI [N39.0]  Yes   • Hyponatremia [E87.1]  Yes   • Hypertension [I10]  Yes      Resolved Hospital Problems   No resolved problems to display.        Brief Hospital Course to date:  Nicole Garcia is a 82 y.o. female admitted with painless jaundice and found to have pancreatic mass, cholelithiasis and concern for cholangitis/cholecystitis.  She was seen by Dr. Obrien on 1/8/2023.  She underwent ERCP with Dr. Obrien and Dr. Ibarra's.  Findings were-3 cm malignant appearing stricture stricture with dilation of common bile  duct.  10 mm x 60 mm stent was placed and brushings were sent.    Painless jaundice with pancreatic mass  Post ERCP abdominal pain  GERD  Initial concern for cholangitis/cholecystitis  Cholelithiasis  -Underwent ERCP on 1/8/2023 as above  -Continue PPI and GI cocktail as needed  -Pending pathology/brushings  -Heme-onc consult requested 1/9/2023  -Needs further work-up but pending heme-onc recs  -CA 19-9 elevated at 1,653  -Discussed findings of ERCP with GI, okay to discontinue Zosyn  -Regular diet    UTI  -Received Zosyn x3 days  -Repeat UA pending  -If UA still consistent with infection we will add oral antibiotic    Hypokalemia  -Replace per protocol    Expected Discharge Location and Transportation: Home, private car  Expected Discharge 1/10/2023 pending work-up from heme-onc       DVT prophylaxis:  Mechanical DVT prophylaxis orders are present.          CODE STATUS:   Code Status and Medical Interventions:   Ordered at: 01/08/23 0024     Level Of Support Discussed With:    Patient     Code Status (Patient has no pulse and is not breathing):    CPR (Attempt to Resuscitate)     Medical Interventions (Patient has pulse or is breathing):    Full Support       Shaina Frank MD  01/09/23

## 2023-01-09 NOTE — PLAN OF CARE
Goal Outcome Evaluation:  Plan of Care Reviewed With: patient, daughter, family        Progress: no change  Outcome Evaluation: OT evaluation completed. Pt presents with mild balance deficits, generalized weakness, and decreased activity tolerance limiting independence with ADLs. Pt ambulated household distance in prep for ADLs with supervision and no LOB. Pt performed sink side grooming with supervision. Rec IP OT and home with home health OT and PT.

## 2023-01-09 NOTE — PLAN OF CARE
Goal Outcome Evaluation:  Plan of Care Reviewed With: patient        Progress: no change  Outcome Evaluation: Patient has had a very hard time this shift. Patient has not rested well and pain has been an issue. New orders received for Morphine 3mg q3. dose given x 1 with no results. Melatonin inaffective for patients sleep. vitals are stable. on room air. patient ambulated to/from bathroom x1 this shift.

## 2023-01-09 NOTE — PROGRESS NOTES
"GI Daily Progress Note  Subjective:    Chief Complaint:  Follow up obstructive jaundice     Ms. Garcia states she had a rough night due to indigestion/\"a pit in her stomach.\"   It felt similar to previous GERD symptoms.  She takes Prilosec daily at home.   Denies abdominal pain this morning and tolerated a regular breakfast.       Objective:    /63 (BP Location: Right arm, Patient Position: Lying)   Pulse 71   Temp 98 °F (36.7 °C) (Oral)   Resp 18   Ht 157.5 cm (62\")   Wt 59 kg (130 lb)   SpO2 97%   BMI 23.78 kg/m²     Physical Exam  Constitutional:       General: She is not in acute distress.  Eyes:      General: Scleral icterus present.   Cardiovascular:      Rate and Rhythm: Normal rate and regular rhythm.   Pulmonary:      Effort: Pulmonary effort is normal. No respiratory distress.   Abdominal:      General: Bowel sounds are normal. There is no distension.      Palpations: Abdomen is soft.      Tenderness: There is no abdominal tenderness.   Skin:     Coloration: Skin is jaundiced.   Neurological:      Mental Status: She is alert and oriented to person, place, and time.   Psychiatric:         Behavior: Behavior normal.      Comments: Very pleasant to speak with          Lab  Lab Results   Component Value Date    WBC 13.13 (H) 01/09/2023    HGB 10.4 (L) 01/09/2023    HGB 11.3 (L) 01/08/2023    HGB 13.9 01/07/2023    MCV 87.0 01/09/2023     01/09/2023       Lab Results   Component Value Date    GLUCOSE 136 (H) 01/09/2023    BUN 10 01/09/2023    CREATININE 0.73 01/09/2023    EGFRIFNONA 71 10/11/2021    EGFRIFAFRI 85 10/11/2021    BCR 13.7 01/09/2023     01/09/2023    K 3.2 (L) 01/09/2023    CO2 27.0 01/09/2023    CALCIUM 8.6 01/09/2023    PROTENTOTREF 7.5 10/11/2021    ALBUMIN 3.2 (L) 01/09/2023    ALKPHOS 402 (H) 01/09/2023    BILITOT 4.8 (H) 01/09/2023     (H) 01/09/2023     (H) 01/09/2023      Latest Reference Range & Units 01/09/23 03:47   Lipase 13 - 60 U/L 47 "     Assessment:    1. 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    2. Obstructive jaundice, due to above   3. Elevated CA 19-9   4. GERD     Plan:    No evidence of post ERCP pancreatitis.  Lipase is normal at 47 this morning.   LFTs are improving.       >> Add BID Protonix for GERD symptoms.  She can continue her home Prilosec at discharge.     >> PRN GI Cocktail  >> Follow up cytology brushings  >> Oncology consultation     ISELA More  01/09/23  09:48 EST

## 2023-01-09 NOTE — CASE MANAGEMENT/SOCIAL WORK
Discharge Planning Assessment  [facility]     Patient Name: Nicole Garcia  MRN: [id number]  Today's Date: 1/9/2023    Admit Date: 1/7/2023    Plan: Home with [facility]   Discharge Needs Assessment     Row Name 01/09/23 1548       Living Environment    People in Home alone    Current Living Arrangements home    Primary Care Provided by self    Provides Primary Care For no one    Family Caregiver if Needed child(xin), adult;grandchild(xin), adult    Family Caregiver Names Fernando Garcia - son    Quality of Family Relationships helpful;involved;supportive    Able to Return to Prior Arrangements yes       Transition Planning    Patient/Family Anticipates Transition to home with help/services    Patient/Family Anticipated Services at Transition home health care    Transportation Anticipated family or friend will provide       Discharge Needs Assessment    Readmission Within the Last 30 Days no previous admission in last 30 days    Equipment Currently Used at Home rollator    Concerns to be Addressed discharge planning    Outpatient/Agency/Support Group Needs homecare agency    Discharge Facility/Level of Care Needs home with home health    Current Discharge Risk chronically ill;lives alone               Discharge Plan     Row Name 01/09/23 3371       Plan    Plan Home with [facility]    Patient/Family in Agreement with Plan yes    Plan Comments I have met with Ms. Garcia at the bedside today to initiate a discharge plan.  She states that she lives alone in her home in [place] [facility] [place] [facility].  She reports that up until a few days ago she has been independent with activities of daily living and mobility, however she has recently become weak.  She does use a rollator occasionally when her gout flares up.  She denies current receipt of home health/outpatient services.  She states that her son is available to provide assistance and transportation when needed.  Prior to this admission she was able to drive  her car.  PT/OT have recommended home with Home health services.  I have confirmed with [facility] that they will provide these services.  Ms. Garcia plans to return home at discharge.  CM will cont to follow the plan of care and assist with discharge planning as recommendations become available.    Final Discharge Disposition Code 06 - home with home health care              Continued Care and Services - Admitted Since 1/7/2023     Home Medical Care     Service Provider Request Status Selected Services Address Phone Fax Patient Preferred    [facility] ,  Home Nursing ,  Home Rehabilitation [address] [facility] [id number] [phone] --    [facility] Declined  Inadequate staffing N/A [address] [address] [phone] --              Expected Discharge Date and Time     Expected Discharge Date Expected Discharge Time    Blake 10, 2023          Demographic Summary     Row Name 01/09/23 1456       General Information    Admission Type inpatient    Arrived From home    Referral Source admission list    Reason for Consult discharge planning    General Information Comments I have confirmed with Ms. Garcia that her PCP is Irish Marrero MD and her insurance is [employer].       Contact Information    Permission Granted to Share Info With                Functional Status     Row Name 01/09/23 1547       Functional Status, IADL    Meal Preparation independent    Housekeeping independent    Laundry independent    Shopping independent               Psychosocial    No documentation.                Abuse/Neglect    No documentation.                Legal    No documentation.                Substance Abuse    No documentation.                Patient Forms    No documentation.                   Rosa Elena Jensen RN

## 2023-01-09 NOTE — THERAPY EVALUATION
Patient Name: Nicole Garcia  : 1940    MRN: [id number]                              Today's Date: 2023       Admit Date: 2023    Visit Dx:     ICD-10-CM ICD-9-CM   1. Elevated liver enzymes  R74.8 790.5   2. Painless jaundice  R17 782.4   3. Pancreatic mass  K86.89 577.8     Patient Active Problem List   Diagnosis   • Allergic rhinitis due to pollen   • Bilateral sensorineural hearing loss   • Impacted cerumen   • Otitis externa of left ear   • Hypertension   • Painless jaundice   • Asthma   • Hypokalemia   • Elevated liver enzymes   • Acute UTI   • Hyponatremia   • Pancreatic mass     Past Medical History:   Diagnosis Date   • Arthritis    • Asthma    • Hypertension      Past Surgical History:   Procedure Laterality Date   • APPENDECTOMY     • BREAST SURGERY      cyst removed   • ERCP N/A 2023    Procedure: ENDOSCOPIC RETROGRADE CHOLANGIOPANCREATOGRAPHY WITH STENT;  Surgeon: Darius Obrien MD;  Location: [facility] ENDOSCOPY;  Service: Gastroenterology;  Laterality: N/A;  Sphincterotomy made. Common bile duct (CBD) bushed for non-gyne cyto. Wall stent 10x 60 placed in CBD.    • SKIN CANCER EXCISION     • TONSILLECTOMY     • TUBAL ABDOMINAL LIGATION        General Information     Row Name 23 1535          OT Time and Intention    Document Type evaluation  -AN     Mode of Treatment occupational therapy  -AN     Row Name 23 1535          General Information    Patient Profile Reviewed yes  -AN     Prior Level of Function independent:;all household mobility;community mobility;gait;ADL's  PRN use of rollator  -AN     Existing Precautions/Restrictions no known precautions/restrictions  -AN     Barriers to Rehab hearing deficit  -AN     Row Name 23 1535          Living Environment    People in Home alone;other (see comments)  Independent living facility  -AN     Row Name 23 1535          Home Main Entrance    Number of Stairs, Main Entrance none  -AN     Row Name 23 1535           Stairs Within Home, Primary    Number of Stairs, Within Home, Primary none  -AN     Stairs Comment, Within Home, Primary tub shower(reports difficulty performing transfers)  -AN     [facility] Name 01/09/23 1535          Cognition    Orientation Status (Cognition) oriented x 4  -AN     [facility] Name 01/09/23 1535          Safety Issues, Functional Mobility    Safety Issues Affecting Function (Mobility) safety precautions follow-through/compliance;safety precaution awareness  -AN     Impairments Affecting Function (Mobility) balance  -AN           User Key  (r) = Recorded By, (t) = Taken By, (c) = Cosigned By    Initials Name Provider Type    AN Juliana Alicia OT Occupational Therapist                 Mobility/ADL's     Row Name 01/09/23 1536          Bed Mobility    Bed Mobility supine-sit  -AN     Supine-Sit [place] (Bed Mobility) modified independence  -AN     [facility] Name 01/09/23 1536          Transfers    Transfers sit-stand transfer;stand-sit transfer;bed-chair transfer  -AN     Row Name 01/09/23 1536          Bed-Chair Transfer    Bed-Chair [place] (Transfers) supervision  -AN     [facility] Name 01/09/23 1536          Sit-Stand Transfer    Sit-Stand [place] (Transfers) supervision  -AN     [facility] Name 01/09/23 1536          Stand-Sit Transfer    Stand-Sit [place] (Transfers) supervision  -AN     [facility] Name 01/09/23 1536          Functional Mobility    Functional Mobility- Ind. Level supervision required  -AN     Functional Mobility-Distance (Feet) --  household distance  -AN     [facility] Name 01/09/23 1536          Activities of Daily Living    BADL Assessment/Intervention grooming;bathing  -AN     [facility] Name 01/09/23 1536          Grooming Assessment/Training    [place] Level (Grooming) wash face, hands;oral care regimen;supervision  makeup  -AN     Position (Grooming) sink side  -AN     [facility] Name 01/09/23 1536          Bathing Assessment/Intervention    Comment, (Bathing) pt reporting difficulty with stepping  over tub shower at home; pt and family educated on tub transfer bench to promote independence and safety  -AN           User Key  (r) = Recorded By, (t) = Taken By, (c) = Cosigned By    Initials Name Provider Type    Juliana Byrd OT Occupational Therapist               Obj/Interventions     Row Name 01/09/23 1537          Sensory Assessment (Somatosensory)    Sensory Assessment (Somatosensory) sensation intact  -AN     Row Name 01/09/23 1537          Vision Assessment/Intervention    Visual Impairment/Limitations WFL  -AN     Row Name 01/09/23 1537          Range of Motion Comprehensive    General Range of Motion no range of motion deficits identified  -AN     Row Name 01/09/23 1537          Strength Comprehensive (MMT)    General Manual Muscle Testing (MMT) Assessment upper extremity strength deficits identified;lower extremity strength deficits identified  -AN     Comment, General Manual Muscle Testing (MMT) Assessment BUE and BLE grossly 4/5  -AN     Row Name 01/09/23 1537          Balance    Balance Assessment sitting static balance;sitting dynamic balance;sit to stand dynamic balance;standing static balance;standing dynamic balance  -AN     Static Sitting Balance independent  -AN     Dynamic Sitting Balance independent  -AN     Position, Sitting Balance sitting edge of bed;sitting in chair  -AN     Sit to Stand Dynamic Balance supervision  -AN     Static Standing Balance supervision  -AN     Dynamic Standing Balance supervision  -AN     Position/Device Used, Standing Balance unsupported  -AN     Comment, Balance no LOB  -AN           User Key  (r) = Recorded By, (t) = Taken By, (c) = Cosigned By    Initials Name Provider Type    Juliana Byrd OT Occupational Therapist               Goals/Plan     Row Name 01/09/23 1542          Transfer Goal 1 (OT)    Activity/Assistive Device (Transfer Goal 1, OT) sit-to-stand/stand-to-sit;toilet  -AN     [place] Level/Cues Needed (Transfer Goal 1, OT)  modified independence  -AN     Time Frame (Transfer Goal 1, OT) long term goal (LTG);10 days  -AN     Row Name 01/09/23 1542          Dressing Goal 1 (OT)    Activity/Device (Dressing Goal 1, OT) upper body dressing;lower body dressing  -AN     [place]/Cues Needed (Dressing Goal 1, OT) modified independence  -AN     Time Frame (Dressing Goal 1, OT) long term goal (LTG);10 days  -AN     Row Name 01/09/23 1542          Toileting Goal 1 (OT)    Activity/Device (Toileting Goal 1, OT) adjust/manage clothing;perform perineal hygiene;commode  -AN     [place] Level/Cues Needed (Toileting Goal 1, OT) modified independence  -AN     Time Frame (Toileting Goal 1, OT) long term goal (LTG);10 days  -AN     [facility] Name 01/09/23 1542          Therapy Assessment/Plan (OT)    Planned Therapy Interventions (OT) activity tolerance training;adaptive equipment training;BADL retraining;functional balance retraining;occupation/activity based interventions;patient/caregiver education/training;transfer/mobility retraining;strengthening exercise  -AN           User Key  (r) = Recorded By, (t) = Taken By, (c) = Cosigned By    Initials Name Provider Type    AN Juliana Alicia OT Occupational Therapist               Clinical Impression     Row Name 01/09/23 1538          Pain Assessment    Additional Documentation Pain Scale: FACES Pre/Post-Treatment (Group)  -AN     Row Name 01/09/23 1538          Pain Scale: FACES Pre/Post-Treatment    Pain: FACES Scale, Pretreatment 2-->hurts little bit  -AN     Posttreatment Pain Rating 2-->hurts little bit  -AN     Pain Location generalized  -AN     Pain Location - abdomen  -AN     Pre/Posttreatment Pain Comment tolerated  -AN     Row Name 01/09/23 1532          Plan of Care Review    Plan of Care Reviewed With patient;daughter;family  -AN     Progress no change  -AN     Outcome Evaluation OT evaluation completed. Pt presents with mild balance deficits, generalized weakness, and decreased activity  tolerance limiting independence with ADLs. Pt ambulated household distance in prep for ADLs with supervision and no LOB. Pt performed sink side grooming with supervision. Rec IP OT and home with home health OT and PT.  -AN     Row Name 01/09/23 1538          Therapy Assessment/Plan (OT)    Patient/Family Therapy Goal Statement (OT) Return to PLOF  -AN     Rehab Potential (OT) good, to achieve stated therapy goals  -AN     Criteria for Skilled Therapeutic Interventions Met (OT) yes;skilled treatment is necessary  -AN     Therapy Frequency (OT) daily  -AN     Row Name 01/09/23 1538          Therapy Plan Review/Discharge Plan (OT)    Anticipated Discharge Disposition (OT) home;home with home health  -AN     Row Name 01/09/23 1538          Vital Signs    Pre Systolic BP Rehab --  VSS  -AN     O2 Delivery Pre Treatment room air  -AN     O2 Delivery Intra Treatment room air  -AN     O2 Delivery Post Treatment room air  -AN     Pre Patient Position Supine  -AN     Intra Patient Position Standing  -AN     Post Patient Position Sitting  -AN     Row Name 01/09/23 1538          Positioning and Restraints    Pre-Treatment Position in bed  -AN     Post Treatment Position chair  -AN     In Chair notified nsg;sitting;call light within reach;encouraged to call for assist;waffle cushion  RN deferred alarm  -AN           User Key  (r) = Recorded By, (t) = Taken By, (c) = Cosigned By    Initials Name Provider Type    Juliana Byrd OT Occupational Therapist               Outcome Measures     Row Name 01/09/23 1548          How much help from another is currently needed...    Putting on and taking off regular lower body clothing? 3  -AN     Bathing (including washing, rinsing, and drying) 3  -AN     Toileting (which includes using toilet bed pan or urinal) 3  -AN     Putting on and taking off regular upper body clothing 4  -AN     Taking care of personal grooming (such as brushing teeth) 3  -AN     Eating meals 4  -AN     AM-PAC  6 Clicks Score (OT) 20  -AN     Row Name 01/09/23 1542          Functional Assessment    Outcome Measure Options AM-PAC 6 Clicks Daily Activity (OT)  -AN           User Key  (r) = Recorded By, (t) = Taken By, (c) = Cosigned By    Initials Name Provider Type    Juliana Byrd OT Occupational Therapist                Occupational Therapy Education     Title: PT OT SLP Therapies (In Progress)     Topic: Occupational Therapy (In Progress)     Point: ADL training (Done)     Description:   Instruct learner(s) on proper safety adaptation and remediation techniques during self care or transfers.   Instruct in proper use of assistive devices.              Learning Progress Summary           Patient Acceptance, E, VU by AN at 1/9/2023 1543   Family Acceptance, E, VU by AN at 1/9/2023 1543                   Point: Home exercise program (Not Started)     Description:   Instruct learner(s) on appropriate technique for monitoring, assisting and/or progressing therapeutic exercises/activities.              Learner Progress:  Not documented in this visit.          Point: Precautions (Done)     Description:   Instruct learner(s) on prescribed precautions during self-care and functional transfers.              Learning Progress Summary           Patient Acceptance, E, VU by AN at 1/9/2023 1543   Family Acceptance, E, VU by AN at 1/9/2023 1543                   Point: Body mechanics (Done)     Description:   Instruct learner(s) on proper positioning and spine alignment during self-care, functional mobility activities and/or exercises.              Learning Progress Summary           Patient Acceptance, E, VU by AN at 1/9/2023 1543   Family Acceptance, E, VU by AN at 1/9/2023 1543                               User Key     Initials Effective Dates Name Provider Type Discipline    RADHIKA 09/21/21 -  Juliaan Alicia OT Occupational Therapist OT              OT Recommendation and Plan  Planned Therapy Interventions (OT): activity  tolerance training, adaptive equipment training, BADL retraining, functional balance retraining, occupation/activity based interventions, patient/caregiver education/training, transfer/mobility retraining, strengthening exercise  Therapy Frequency (OT): daily  Plan of Care Review  Plan of Care Reviewed With: patient, daughter, family  Progress: no change  Outcome Evaluation: OT evaluation completed. Pt presents with mild balance deficits, generalized weakness, and decreased activity tolerance limiting independence with ADLs. Pt ambulated household distance in prep for ADLs with supervision and no LOB. Pt performed sink side grooming with supervision. Rec IP OT and home with home health OT and PT.     Time Calculation:    Time Calculation- OT     Row Name 01/09/23 1543             Time Calculation- OT    OT Start Time 1500  -AN      OT Received On 01/09/23  -AN      OT Goal Re-Cert Due Date 01/19/23  -AN         Timed Charges    35565 - OT Self Care/Mgmt Minutes 8  -AN         Untimed Charges    OT Eval/Re-eval Minutes 31  -AN         Total Minutes    Timed Charges Total Minutes 8  -AN      Untimed Charges Total Minutes 31  -AN       Total Minutes 39  -AN            User Key  (r) = Recorded By, (t) = Taken By, (c) = Cosigned By    Initials Name Provider Type    AN Juliana Alicia OT Occupational Therapist              Therapy Charges for Today     Code Description Service Date Service Provider Modifiers Qty    82925075923 HC OT SELF CARE/MGMT/TRAIN EA 15 MIN 1/9/2023 Juliana Alicia, OT GO 1    35644048110 HC OT EVAL LOW COMPLEXITY 3 1/9/2023 Juliana Alicia OT GO 1               Juliana Alicia OT  1/9/2023

## 2023-01-09 NOTE — CONSULTS
HEMATOLOGY/ONCOLOGY INPATIENT CONSULT    REASON FOR CONSULT: Concerns for cholangiocarcinoma versus pancreatic adenocarcinoma    Subjective   HISTORY OF PRESENT ILLNESS;  Ms. Garcia is an 82-year-old lady with past medical history of asthma, hypertension, arthritis who presented to [facility] with worsening weakness and painless jaundice.  Noted that she had become more yellow over the past week.  States that she had lost at least 5 pounds within the past week.  Previously she was completely independent.  She had also noted some increased malodorous urine.  Her scans were concerning for possible lesion in the pancreas/biliary system with significant ductal dilatation resulting in a bilirubin of 9.9.  She was transferred to [place] for ERCP and biopsy which was completed yesterday by Dr. Bennett.  Pathology results pending.  She overall feels well today.  Denies any significant abdominal pain or discomfort.      Past Medical History:   Diagnosis Date   • Arthritis    • Asthma    • Hypertension      Past Surgical History:   Procedure Laterality Date   • APPENDECTOMY     • BREAST SURGERY      cyst removed   • ERCP N/A 1/8/2023    Procedure: ENDOSCOPIC RETROGRADE CHOLANGIOPANCREATOGRAPHY WITH STENT;  Surgeon: Darius Obrien MD;  Location: [facility];  Service: Gastroenterology;  Laterality: N/A;  Sphincterotomy made. Common bile duct (CBD) bushed for non-gyne cyto. Wall stent 10x 60 placed in CBD.    • SKIN CANCER EXCISION     • TONSILLECTOMY     • TUBAL ABDOMINAL LIGATION         No current facility-administered medications on file prior to encounter.     Current Outpatient Medications on File Prior to Encounter   Medication Sig Dispense Refill   • Lifitegrast (Xiidra) 5 % ophthalmic solution Xiidra 5 % eye drops in a dropperette     • Calcium Carb-Cholecalciferol (Oyster Shell Calcium w/D) 500-5 MG-MCG tablet Take 1 tablet by mouth Daily.     • lactase (Lactaid) 3000 units tablet Take 1  "tablet by mouth 3 (Three) Times a Day With Meals. 90 tablet 11   • methylPREDNISolone (MEDROL) 4 MG dose pack Take as directed on package instructions. 21 tablet 0   • omeprazole (priLOSEC) 20 MG capsule Take 20 mg by mouth Daily.     • triamterene-hydrochlorothiazide (MAXZIDE-25) 37.5-25 MG per tablet TAKE ONE TABLET BY MOUTH EVERY DAY (Patient taking differently: Take 1 tablet by mouth Daily.) 90 tablet 2       Allergies   Allergen Reactions   • Lactose Intolerance (Gi) GI Intolerance   • Aspirin Hives   • Procaine Hives       Social History     Socioeconomic History   • Marital status:    Tobacco Use   • Smoking status: Never   • Smokeless tobacco: Never   Vaping Use   • Vaping Use: Never used   Substance and Sexual Activity   • Alcohol use: Never   • Drug use: Never   • Sexual activity: Defer       Family History   Problem Relation Age of Onset   • Kidney disease Mother    • Asthma Mother    • Kidney disease Father    • Kidney disease Sister    • Asthma Sister    • Kidney disease Brother    • Asthma Brother    • Asthma Son          REVIEW OF SYSTEMS:  A 12 point review of systems was performed and is negative except as noted above.    Objective   PHYSICAL EXAM:    /80 (BP Location: Right arm)   Pulse 71   Temp 98 °F (36.7 °C) (Oral)   Resp 18   Ht 157.5 cm (62\")   Wt 59 kg (130 lb)   SpO2 97%   BMI 23.78 kg/m²       General: well appearing female in no acute distress  HEENT: Scleral icterus noted, oropharynx clear  Lymphatics: no cervical, supraclavicular, inguinal, or axillary adenopathy  Neck: Supple. No thyromegaly.  Cardiovascular: regular rate and rhythm, no murmurs  Lungs: clear to auscultation bilaterally. No respiratory distress  Abdomen: soft, nontender, nondistended.  No palpable organomegaly  Extremities: no lower extremity edema, cyanosis, or clubbing  Skin: no rashes, lesions, bruising, or petechiae  Neuro: Alert and oriented x3. Moves all extremities.    Results:    Results from " last 7 days   Lab Units 01/09/23  0347 01/08/23  0342 01/07/23  1352   WBC 10*3/mm3 13.13* 10.47 11.94*   HEMOGLOBIN g/dL 10.4* 11.3* 13.9   PLATELETS 10*3/mm3 402 400 501*     Results from last 7 days   Lab Units 01/09/23  0347 01/08/23  0342 01/07/23  1352   SODIUM mmol/L 137 137 132*   POTASSIUM mmol/L 3.2* 3.6 3.0*   CO2 mmol/L 27.0 25.0 25.6   BUN mg/dL 10 9 11   CREATININE mg/dL 0.73 0.73 0.85   GLUCOSE mg/dL 136* 106* 115*     Results from last 7 days   Lab Units 01/09/23  0347 01/08/23  0342 01/07/23  1352   AST (SGOT) U/L 189* 380* 468*   ALT (SGPT) U/L 188* 233* 311*   BILIRUBIN mg/dL 4.8* 9.9* 9.7*   ALK PHOS U/L 402* 424* 544*         CT Abdomen Pelvis With Contrast    Result Date: 1/7/2023  1. Intrahepatic and extrahepatic biliary ductal dilatation to the level of the distal common duct where there is abnormal soft tissue density which could be related to a stricture, neoplastic process, or subtle small stones in the distal duct. ERCP is recommended.  2. Abnormal gallbladder with focal thickening of the fundus of the gallbladder as well as probable small layering stones. Adenomyomatosis or neoplasm of the gallbladder not excluded.  3. Abnormal extraperitoneal air in the pelvis of uncertain etiology. No history of recent surgery was provided. Please correlate with any recent instrumentation. No associated free intraperitoneal air.        351.30 mGy.cm    This study was performed with techniques to keep radiation doses as low as reasonably achievable (ALARA). Individualized dose reduction techniques using automated exposure control or adjustment of mA and/or kV according to the patient size were employed.  This report was signed and finalized on 1/7/2023 3:07 PM by Alexandria Raygoza MD.    [place] ERCP pancreatic and biliary ducts    Result Date: 1/9/2023  Impression: Fluoroscopy provided during ERCP and stent placement. Electronically Signed: Adonis Saxena  1/9/2023 10:20 AM EST  Workstation ID: [id number]    XR Chest  1 View    Result Date: 1/7/2023  No acute cardiac or pulmonary disease on this portable exam. Hiatal hernia.  This report was signed and finalized on 1/7/2023 3:21 PM by Alexandria Raygoza MD.      Assessment    ASSESSMENT & PLAN:  Biliary/pancreatic carcinoma  -Noted on recent CT scans  -CA 19-9 elevated to 1653  -Status post ERCP with stent placement and biopsy on 1/8/2023.  Pathology pending  -We will order CT chest for complete staging  -Discussed the potential diagnosis, prognosis, potential treatment plans with patient and daughter-in-law today  -We will plan for outpatient follow-up with me in 1 week to discuss her next treatment steps    Hyperbilirubinemia  -Secondary to malignancy  -Status post ERCP with stent placement with downtrending bilirubin of 4.4 today    UTI  -Improving with antibiotics  -Cultures with no growth to date    Thank you for the consult.  Please do not hesitate to contact with any questions or concerns.  Okay from an oncologic perspective for discharge.  We will plan for outpatient follow with me in about 1 week    Santiago Ang MD  Hematology and Oncology    1/9/2023  14:02 EST

## 2023-01-10 ENCOUNTER — TELEPHONE (OUTPATIENT)
Dept: INTERNAL MEDICINE | Facility: CLINIC | Age: 83
End: 2023-01-10
Payer: MEDICARE

## 2023-01-10 ENCOUNTER — READMISSION MANAGEMENT (OUTPATIENT)
Dept: CALL CENTER | Facility: HOSPITAL | Age: 83
End: 2023-01-10
Payer: MEDICARE

## 2023-01-10 VITALS
SYSTOLIC BLOOD PRESSURE: 126 MMHG | HEIGHT: 62 IN | OXYGEN SATURATION: 94 % | HEART RATE: 77 BPM | WEIGHT: 130 LBS | DIASTOLIC BLOOD PRESSURE: 57 MMHG | TEMPERATURE: 98 F | RESPIRATION RATE: 18 BRPM | BODY MASS INDEX: 23.92 KG/M2

## 2023-01-10 LAB — REF LAB TEST METHOD: NORMAL

## 2023-01-10 PROCEDURE — 97161 PT EVAL LOW COMPLEX 20 MIN: CPT

## 2023-01-10 PROCEDURE — 99238 HOSP IP/OBS DSCHRG MGMT 30/<: CPT | Performed by: INTERNAL MEDICINE

## 2023-01-10 RX ADMIN — Medication 10 ML: at 09:20

## 2023-01-10 RX ADMIN — PANTOPRAZOLE SODIUM 40 MG: 40 TABLET, DELAYED RELEASE ORAL at 07:25

## 2023-01-10 NOTE — PLAN OF CARE
Goal Outcome Evaluation:  Plan of Care Reviewed With: patient        Progress: no change  Outcome Evaluation: Patient rested well this shift. vitals stable. remained on room air. patient up to restroom multiple times independently through out shift. no pain voiced from patient. possible discharge home today.

## 2023-01-10 NOTE — DISCHARGE SUMMARY
[facility] Medicine Services  DISCHARGE SUMMARY    Patient Name: Nicole Garcia  : 1940  MRN: [id number]    Date of Admission: 2023 11:44 PM  Date of Discharge: 1/10/2023  Primary Care Physician: Zaida Marrero APRN    Consults     Date and Time Order Name Status Description    2023 12:34 AM Inpatient Hematology & Oncology Consult Completed     2023 12:24 AM Inpatient Gastroenterology Consult Completed           Hospital Course     Presenting Problem:   Painless jaundice [R17]    Active Hospital Problems    Diagnosis  POA   • **Painless jaundice [R17]  Yes   • Pancreatic mass [K86.89]  Yes   • Asthma [J45.909]  Yes   • Hypokalemia [E87.6]  Yes   • Elevated liver enzymes [R74.8]  Yes   • Acute UTI [N39.0]  Yes   • Hyponatremia [E87.1]  Yes   • Hypertension [I10]  Yes      Resolved Hospital Problems   No resolved problems to display.          Hospital Course:  Nicole Garcia is a 82 y.o. female who presented 2023 with painless jaundice.  Work-up demonstrated a pancreatic mass, cholelithiasis, initial concern for cholangitis/cholecystitis.  She was seen by GI on 2023 and underwent ERCP with biliary stent placement.  Findings include a 3 cm malignant appearing stricture and dilation of the common bile duct.  A 10 mm x 60 mm stent was placed and brushings were obtained and sent for pathology which is currently pending.  On 2023 she was seen by oncology who recommended follow-up in the clinic in 1 week for pathology results and further discussions on treatment options.  She was predominantly on antibiotics for concern of cholecystitis/cholangitis, per documentation this was recently discontinued.  There is mention of UTI, no urine culture appears to have been sent, repeat UA  demonstrated pyuria only and additionally was a contaminated sample.  She is discharging home with oncology and GI follow-up in the clinic.      Discharge Follow Up Recommendations for  outpatient labs/diagnostics:  PCP 1-2 weeks, post hospital follow-up  Dr. Ang, oncology, 1 week for pathology results  GI clinic, 1 month, follow-up on biliary stent placement    Day of Discharge     HPI:   Having some difficulty drinking liquids, otherwise no acute complaints this morning.  Feels ready to go home.    Review of Systems  Gen- No fevers, chills  CV- No chest pain, palpitations  Resp- No cough, dyspnea  GI- No N/V/D, abd pain    Vital Signs:   Temp:  [97.1 °F (36.2 °C)-98.4 °F (36.9 °C)] 98 °F (36.7 °C)  Heart Rate:  [65-77] 77  Resp:  [18] 18  BP: (120-141)/(57-80) 126/57  Flow (L/min):  [2] 2      Physical Exam:  Constitutional: Awake, alert, sitting up in bed no acute distress  HENT: NCAT, mucous membranes moist  Respiratory: Clear to auscultation bilaterally, respiratory effort normal   Cardiovascular: RRR, no murmurs, rubs, or gallops  Gastrointestinal: Positive bowel sounds, soft, nontender, nondistended    Pertinent  and/or Most Recent Results     LAB RESULTS:      Lab 01/09/23  0347 01/08/23  0342 01/07/23  1540 01/07/23  1352   WBC 13.13* 10.47  --  11.94*   HEMOGLOBIN 10.4* 11.3*  --  13.9   HEMATOCRIT 31.4* 32.9*  --  40.7   PLATELETS 402 400  --  501*   NEUTROS ABS  --   --   --  10.01*   IMMATURE GRANS (ABS)  --   --   --  0.16*   LYMPHS ABS  --   --   --  1.16   MONOS ABS  --   --   --  0.48   EOS ABS  --   --   --  0.08   MCV 87.0 85.0  --  83.4   PROCALCITONIN  --   --   --  0.45*   LACTATE  --   --  1.5  --          Lab 01/09/23  1811 01/09/23 0347 01/08/23  0342 01/07/23  1352   SODIUM  --  137 137 132*   POTASSIUM 4.4 3.2* 3.6 3.0*   CHLORIDE  --  96* 98 91*   CO2  --  27.0 25.0 25.6   ANION GAP  --  14.0 14.0 15.4*   BUN  --  10 9 11   CREATININE  --  0.73 0.73 0.85   EGFR  --  81.2 81.2 67.7   GLUCOSE  --  136* 106* 115*   CALCIUM  --  8.6 8.7 10.1   MAGNESIUM  --  2.9*  --  1.5*         Lab 01/09/23  0347 01/08/23  0342 01/07/23  1352   TOTAL PROTEIN 6.4 5.8* 8.1   ALBUMIN  3.2* 3.1* 4.0   GLOBULIN 3.2 2.7 4.1   ALT (SGPT) 188* 233* 311*   AST (SGOT) 189* 380* 468*   BILIRUBIN 4.8* 9.9* 9.7*   ALK PHOS 402* 424* 544*   LIPASE 47  --   --          Lab 01/07/23  1352   TROPONIN T <0.010                 Brief Urine Lab Results  (Last result in the past 365 days)      Color   Clarity   Blood   Leuk Est   Nitrite   Protein   CREAT   Urine HCG        01/09/23 1456 Dark Yellow   Cloudy   Negative   Small (1+)   Positive   Negative               Microbiology Results (last 10 days)     Procedure Component Value - Date/Time    Blood Culture - Blood, Arm, Right [[id number]]  (Normal) Collected: 01/07/23 1550    Lab Status: Preliminary result Specimen: Blood from Arm, Right Updated: 01/09/23 1601     Blood Culture No growth at 2 days    Blood Culture - Blood, Hand, Left [[id number]]  (Normal) Collected: 01/07/23 1540    Lab Status: Preliminary result Specimen: Blood from Hand, Left Updated: 01/09/23 1601     Blood Culture No growth at 2 days          Adult Transthoracic Echo Complete W/ Cont if Necessary Per Protocol    Result Date: 1/8/2023  •  Left ventricular wall thickness is consistent with mild concentric hypertrophy. •  There is calcification of the aortic valve. •  Moderate tricuspid valve regurgitation is present. •  Estimated right ventricular systolic pressure from tricuspid regurgitation is mildly elevated (35-45 mmHg). •  Normal LV systolic function •  Mild AI; no AS     CT Chest With Contrast Diagnostic    Result Date: 1/9/2023  CT CHEST W CONTRAST DIAGNOSTIC Date of Exam: 1/9/2023 1:48 PM EST Indication: biliopancreatic malignancy. Comparison: None available. Technique: Axial CT images were obtained of the chest after the uneventful intravenous administration of 85 cc Isovue-300 .  Reconstructed coronal and sagittal images were also obtained. Automated exposure control and iterative construction methods were used. Findings: Rita/mediastinum: No adenopathy. Thoracic aorta normal in  caliber. Coronary artery calcification. No pericardial effusion. Moderate hiatal hernia, with a gastric diverticulum protruding through the esophageal hiatus back into the abdominal cavity Lungs/pleura: Trace pleural effusions. Postinflammatory fibronodular changes in the lung apices. No suspicious pulmonary nodule. Tiny nodules related to the right major and minor fissures compatible with intrafissural lymph nodes (images 41, 42 44.) Calcified granuloma in the lingula Upper abdomen: Metallic biliary stent present, with pneumobilia and gas in the gallbladder lumen. Wall thickening of the gallbladder Bones/soft tissues: No suspicious bone lesion     Impression: 1. No evidence of thoracic metastatic disease 2. Trace pleural effusions 3. Calcific coronary atherosclerosis 4. Moderate hiatal hernia 5. Findings related to biliary stent placement, with nonspecific gallbladder wall thickening Electronically Signed: Zac Abbott  1/9/2023 5:51 PM EST  Workstation ID: [id number]    CT Abdomen Pelvis With Contrast    Result Date: 1/7/2023  EXAM: CT ABDOMEN PELVIS W CONTRAST-  INDICATION: painless jaundice  TECHNIQUE:  Thin section axial images were obtained from the lung bases to the pubic symphysis following IV contrast administration.  Coronal reconstruction images were obtained from the axial data.  COMPARISON: None.  FINDINGS:  ABDOMEN: The lung bases are clear.   No focal liver lesion is identified. There is intrahepatic biliary ductal dilatation.  The gallbladder is distended. There may be small gallstones. There is focal thickening of the fundus of the gallbladder. The common bile duct is dilated to 16 mm (coronal image 41). There is increased density in the distal duct on CT. Discrete stones are not identified. This increased density could be due to abnormal soft tissue, small layering stones, or a stricture.   The spleen, right adrenal gland, and pancreas are without acute abnormality. There is a small left adrenal  nodule.   The kidneys are within normal limits.  There is no hydronephrosis, mass or perinephric stranding.   There is a large hiatal hernia. A small duodenal diverticulum is present. There is no small bowel obstruction.  There is no lymphadenopathy or ascites. Atherosclerotic disease is noted.  PELVIS: The uterus is present.   The appendix is not identified. There is a small round collection of air along the posterior wall of the cecum. Given that there is pandiverticulosis, this is favored to be a diverticulum with a very thin wall.. There are areas of colonic wall thickening which are favored to be related to incomplete distention. There is no pelvic lymphadenopathy or pelvic ascites. However, there is soft tissue air in the extraperitoneal region of the pelvis, inferior to the urinary bladder. There is a small amount of subcutaneous air in both groins. Etiology for this air is unclear. Mild compression deformity of L2 is of age indeterminant..  Scratch      1. Intrahepatic and extrahepatic biliary ductal dilatation to the level of the distal common duct where there is abnormal soft tissue density which could be related to a stricture, neoplastic process, or subtle small stones in the distal duct. ERCP is recommended.  2. Abnormal gallbladder with focal thickening of the fundus of the gallbladder as well as probable small layering stones. Adenomyomatosis or neoplasm of the gallbladder not excluded.  3. Abnormal extraperitoneal air in the pelvis of uncertain etiology. No history of recent surgery was provided. Please correlate with any recent instrumentation. No associated free intraperitoneal air.        351.30 mGy.cm    This study was performed with techniques to keep radiation doses as low as reasonably achievable (ALARA). Individualized dose reduction techniques using automated exposure control or adjustment of mA and/or kV according to the patient size were employed.  This report was signed and finalized on  1/7/2023 3:07 PM by Alexandria Raygoza MD.    FL ERCP pancreatic and biliary ducts    Result Date: 1/9/2023  FL ERCP PANCREATIC AND BILIARY DUCTS Date of Exam: 1/8/2023 11:11 AM EST Indication: ERCP. Comparison: None available. Technique:  A series of radiographic digital spot films were obtained in conjunction with a endoscopic catheterization of the biliary and pancreatic ductal system, performed by the gastroenterologist. Fluoroscopic Time: 3 minutes 12 seconds Number of Images: 4 Findings: A total of 4 images obtained show endoscope and side cannula placement, contrast injection into the common duct, and subsequent Wallstent placement into common duct. Please see the procedure report for full details.     Impression: Fluoroscopy provided during ERCP and stent placement. Electronically Signed: Adonis Saxena  1/9/2023 10:20 AM EST  Workstation ID: [id number]    XR Chest 1 View    Result Date: 1/7/2023  PROCEDURE: XR CHEST 1 VW-  INDICATION:  Weak/Dizzy/AMS triage protocol  FINDINGS:  A portable view of the chest was obtained.  Comparison is made to a prior exam dated 06/30/2021.   Cardiac and mediastinal silhouettes are normal. Retrocardiac opacity is likely a hiatal hernia. The lungs are clear. There is no pleural effusion or pneumothorax.      No acute cardiac or pulmonary disease on this portable exam. Hiatal hernia.  This report was signed and finalized on 1/7/2023 3:21 PM by Alexandria Raygoza MD.              Results for orders placed during the hospital encounter of 01/07/23    Adult Transthoracic Echo Complete W/ Cont if Necessary Per Protocol    Interpretation Summary  •  Left ventricular wall thickness is consistent with mild concentric hypertrophy.  •  There is calcification of the aortic valve.  •  Moderate tricuspid valve regurgitation is present.  •  Estimated right ventricular systolic pressure from tricuspid regurgitation is mildly elevated (35-45 mmHg).  •  Normal LV systolic function  •  Mild AI; no  AS      Plan for Follow-up of Pending Labs/Results: Clinic follow-up with oncology in 1 week  Pending Labs     Order Current Status    NON-GYN CYTOLOGY, [facility] ([facility]) In process        Discharge Details        Discharge Medications      Continue These Medications      Instructions Start Date   Lactaid 3000 units tablet  Generic drug: lactase   3,000 Units, Oral, 3 Times Daily With Meals      omeprazole 20 MG capsule  Commonly known as: priLOSEC   20 mg, Oral, Daily      Oyster Shell Calcium w/D 500-5 MG-MCG tablet   1 tablet, Oral, Daily      triamterene-hydrochlorothiazide 37.5-25 MG per tablet  Commonly known as: MAXZIDE-25   TAKE ONE TABLET BY MOUTH EVERY DAY      Xiidra 5 % ophthalmic solution  Generic drug: Lifitegrast   Xiidra 5 % eye drops in a dropperette         Stop These Medications    methylPREDNISolone 4 MG dose pack  Commonly known as: MEDROL            Allergies   Allergen Reactions   • Lactose Intolerance (Gi) GI Intolerance   • Aspirin Hives   • Procaine Hives         Discharge Disposition:  Home-Health Care [facility]    Diet:  Hospital:  Diet Order   Procedures   • Diet: Regular/House Diet; Texture: Regular Texture (IDDSI 7); Fluid Consistency: Thin (IDDSI 0)            CODE STATUS:    Code Status and Medical Interventions:   Ordered at: 01/08/23 0024     Level Of Support Discussed With:    Patient     Code Status (Patient has no pulse and is not breathing):    CPR (Attempt to Resuscitate)     Medical Interventions (Patient has pulse or is breathing):    Full Support       Future Appointments   Date Time Provider Department Center   10/18/2023  1:30 PM Zaida Marrero APRN [facility] [place]       Additional Instructions for the Follow-ups that You Need to Schedule     Ambulatory Referral to Home Health   As directed      Face to Face Visit Date: 1/9/2023    Follow-up provider for Plan of Care?: I treated the patient in an acute care facility and will not continue treatment after  discharge.    Follow-up provider: ZAIDA WANG [[id number]]    Reason/Clinical Findings: pancreatic mass, jaundice, Hypertension    Describe mobility limitations that make leaving home difficult: impaired functional mobility, weakness    Nursing/Therapeutic Services Requested: Skilled Nursing Physical Therapy Occupational Therapy    Skilled nursing orders: Medication education Cardiopulmonary assessments    PT orders: Therapeutic exercise Gait Training Transfer training Strengthening Home safety assessment    Weight Bearing Status: As Tolerated    Occupational orders: Activities of daily living Strengthening Home safety assessment    Frequency: 1 Week 1         Discharge Follow-up with PCP   As directed       Currently Documented PCP:    Zaida Wang APRN    PCP Phone Number:    [phone]     Follow Up Details: 1-2 weeks         Discharge Follow-up with Specified Provider: [facility] - 1 month   As directed      To: [facility] - 1 month         Discharge Follow-up with Specified Provider: Dr. Ang, oncology, 1 week   As directed      To: Dr. Ang, oncology, 1 week                     Serge Haines DO  01/10/23      Time Spent on Discharge:  I spent  20  minutes on this discharge activity which included: face-to-face encounter with the patient, reviewing the data in the system, coordination of the care with the nursing staff as well as consultants, documentation, and entering orders.

## 2023-01-10 NOTE — TELEPHONE ENCOUNTER
Caller: BRADLEY BANSAL    Relationship: Home Health    Best call back number: [phone]  What is the best time to reach you: ANY     Who are you requesting to speak with (clinical staff, provider,  specific staff member): CLINICAL         What was the call regarding:PATIENT DISCHARGED FROM HOSPITAL TODAY AND HAS ORDERS FOR HOME HEALTH,  WANTS TO MAKE SURE PCP IS OK WITH FOLLOWING ORDERS WITH HOME HEALTH     Do you require a callback: YES

## 2023-01-10 NOTE — OUTREACH NOTE
Prep Survey    Flowsheet Row Responses   [facility] facility patient discharged from? [facility]   Is LACE score < 7 ? No   Eligibility [facility]   Date of Admission 01/07/23   Date of Discharge 01/03/23   Discharge Disposition Home-Health Care Svc   Discharge diagnosis  ENDOSCOPIC RETROGRADE CHOLANGIOPANCREATOGRAPHY WITH STENT,  Pancreatic mass UTI    Does the patient have one of the following disease processes/diagnoses(primary or secondary)? General Surgery   Does the patient have Home health ordered? Yes   What is the Home health agency?  [facility]   Is there a DME ordered? No   Prep survey completed? Yes          BOB DONNELLY - Registered Nurse

## 2023-01-10 NOTE — THERAPY EVALUATION
Patient Name: Nicole Garcia  : 1940    MRN: [id number]                              Today's Date: 1/10/2023       Admit Date: 2023    Visit Dx:     ICD-10-CM ICD-9-CM   1. Elevated liver enzymes  R74.8 790.5   2. Painless jaundice  R17 782.4   3. Pancreatic mass  K86.89 577.8   4. Hyponatremia  E87.1 276.1   5. Acute UTI  N39.0 599.0   6. Primary hypertension  I10 401.9     Patient Active Problem List   Diagnosis   • Allergic rhinitis due to pollen   • Bilateral sensorineural hearing loss   • Impacted cerumen   • Otitis externa of left ear   • Hypertension   • Painless jaundice   • Asthma   • Hypokalemia   • Elevated liver enzymes   • Acute UTI   • Hyponatremia   • Pancreatic mass     Past Medical History:   Diagnosis Date   • Arthritis    • Asthma    • Hypertension      Past Surgical History:   Procedure Laterality Date   • APPENDECTOMY     • BREAST SURGERY      cyst removed   • ERCP N/A 2023    Procedure: ENDOSCOPIC RETROGRADE CHOLANGIOPANCREATOGRAPHY WITH STENT;  Surgeon: Darius Obrien MD;  Location: [facility] ENDOSCOPY;  Service: Gastroenterology;  Laterality: N/A;  Sphincterotomy made. Common bile duct (CBD) bushed for non-gyne cyto. Wall stent 10x 60 placed in CBD.    • SKIN CANCER EXCISION     • TONSILLECTOMY     • TUBAL ABDOMINAL LIGATION        General Information     Row Name 01/10/23 0824          Physical Therapy Time and Intention    Document Type evaluation  -AB     Mode of Treatment physical therapy  -AB     Row Name 01/10/23 0824          General Information    Patient Profile Reviewed yes  -AB     Prior Level of Function independent:;all household mobility;community mobility;gait;transfer;bed mobility;driving  PRN use of rollator.  -AB     Existing Precautions/Restrictions no known precautions/restrictions  -AB     Barriers to Rehab hearing deficit  -AB     Row Name 01/10/23 0824          Living Environment    People in Home alone  -AB     Row Name 01/10/23 0824          Home Main  Entrance    Number of Stairs, Main Entrance none  -AB     Row Name 01/10/23 0824          Stairs Within Home, Primary    Number of Stairs, Within Home, Primary none  -AB     Row Name 01/10/23 0824          Cognition    Orientation Status (Cognition) oriented x 4  -AB     Row Name 01/10/23 0824          Safety Issues, Functional Mobility    Safety Issues Affecting Function (Mobility) safety precaution awareness;insight into deficits/self-awareness  -AB     Impairments Affecting Function (Mobility) balance  -AB           User Key  (r) = Recorded By, (t) = Taken By, (c) = Cosigned By    Initials Name Provider Type    AB Rohini Vizcarra, PT Physical Therapist               Mobility     Row Name 01/10/23 0825          Bed Mobility    Bed Mobility supine-sit  -AB     Supine-Sit [place] (Bed Mobility) modified independence  -AB     Assistive Device (Bed Mobility) bed rails  -AB     Comment, (Bed Mobility) Use of bed rails for pulling up into sit.  -AB     Row Name 01/10/23 0825          Transfers    Comment, (Transfers) Good safety awareness throughout. Decreased speed with some mobility. No use of AD.  -AB     Row Name 01/10/23 0825          Bed-Chair Transfer    Bed-Chair [place] (Transfers) supervision  -AB     Row Name 01/10/23 0825          Sit-Stand Transfer    Sit-Stand [place] (Transfers) supervision  -AB     Row Name 01/10/23 0825          Gait/Stairs (Locomotion)    [place] Level (Gait) contact guard;1 person assist  -AB     Distance in Feet (Gait) 150  -AB     Deviations/Abnormal Patterns (Gait) jimenez decreased;gait speed decreased  -AB     Bilateral Gait Deviations forward flexed posture  -AB     [place] Level (Stairs) not tested  -AB     Comment, (Gait/Stairs) Pt with sig forward flexed posture during gait and slow, deliberate jimenez. No AD used. Further ambulation limited by pt fatigue.  -AB           User Key  (r) = Recorded By, (t) = Taken By, (c) = Cosigned By    Initials Name  Provider Type    AB Rohini Vizcarra PT Physical Therapist               Obj/Interventions     Row Name 01/10/23 0828          Range of Motion Comprehensive    General Range of Motion no range of motion deficits identified  -AB     Row Name 01/10/23 0828          Strength Comprehensive (MMT)    General Manual Muscle Testing (MMT) Assessment lower extremity strength deficits identified  -AB     Comment, General Manual Muscle Testing (MMT) Assessment BLE strength grossly 4+/5  -AB     Row Name 01/10/23 0828          Motor Skills    Therapeutic Exercise hip;knee;ankle  -AB     Row Name 01/10/23 0828          Hip (Therapeutic Exercise)    Hip (Therapeutic Exercise) strengthening exercise  -AB     Hip Strengthening (Therapeutic Exercise) bilateral;marching while seated;10 repetitions;sitting  -AB     Row Name 01/10/23 0828          Knee (Therapeutic Exercise)    Knee (Therapeutic Exercise) strengthening exercise  -AB     Knee Strengthening (Therapeutic Exercise) bilateral;LAQ (long arc quad);10 repetitions;sitting  -AB     Row Name 01/10/23 0828          Ankle (Therapeutic Exercise)    Ankle (Therapeutic Exercise) AROM (active range of motion)  -AB     Ankle AROM (Therapeutic Exercise) bilateral;dorsiflexion;plantarflexion;20 repititions;sitting  -AB     Row Name 01/10/23 0828          Balance    Balance Assessment sitting static balance;sitting dynamic balance;standing static balance;standing dynamic balance  -AB     Static Sitting Balance independent  -AB     Dynamic Sitting Balance independent  -AB     Position, Sitting Balance unsupported;sitting edge of bed  -AB     Static Standing Balance supervision  -AB     Dynamic Standing Balance contact guard;1-person assist  -AB     Position/Device Used, Standing Balance unsupported  -AB     Balance Interventions sitting;standing;sit to stand;static;dynamic;minimal challenge  -AB     Comment, Balance No LOB. Some episodes of unsteadiness with pt able to self correct w/o cues.   -AB     Row Name 01/10/23 0828          Sensory Assessment (Somatosensory)    Sensory Assessment (Somatosensory) LE sensation intact  -AB           User Key  (r) = Recorded By, (t) = Taken By, (c) = Cosigned By    Initials Name Provider Type    AB Rohini Vizcarra, PT Physical Therapist               Goals/Plan     Row Name 01/10/23 0834          Bed Mobility Goal 1 (PT)    Activity/Assistive Device (Bed Mobility Goal 1, PT) sit to supine;supine to sit  -AB     [place] Level/Cues Needed (Bed Mobility Goal 1, PT) independent  -AB     Time Frame (Bed Mobility Goal 1, PT) long term goal (LTG);10 days  -AB     Row Name 01/10/23 0834          Transfer Goal 1 (PT)    Activity/Assistive Device (Transfer Goal 1, PT) sit-to-stand/stand-to-sit;bed-to-chair/chair-to-bed  -AB     [place] Level/Cues Needed (Transfer Goal 1, PT) independent  -AB     Time Frame (Transfer Goal 1, PT) long term goal (LTG);10 days  -AB     Row Name 01/10/23 0834          Gait Training Goal 1 (PT)    Activity/Assistive Device (Gait Training Goal 1, PT) gait (walking locomotion)  -AB     [place] Level (Gait Training Goal 1, PT) independent  -AB     Distance (Gait Training Goal 1, PT) 200  -AB     Time Frame (Gait Training Goal 1, PT) long term goal (LTG);10 days  -AB     Row Name 01/10/23 0834          Therapy Assessment/Plan (PT)    Planned Therapy Interventions (PT) balance training;bed mobility training;gait training;home exercise program;patient/family education;strengthening;stretching;transfer training  -AB           User Key  (r) = Recorded By, (t) = Taken By, (c) = Cosigned By    Initials Name Provider Type    AB Rohini Vizcarra, PT Physical Therapist               Clinical Impression     Row Name 01/10/23 0829          Pain    Pretreatment Pain Rating 0/10 - no pain  -AB     Posttreatment Pain Rating 0/10 - no pain  -AB     Additional Documentation Pain Scale: Numbers Pre/Post-Treatment (Group)  -AB     Row Name 01/10/23 0829           Plan of Care Review    Plan of Care Reviewed With patient  -AB     Progress no change  -AB     Outcome Evaluation PT initial eval completed. Pt presents with mild balance deficits and decreased activity tolerance limiting functional mobility. Ambulation of 150' with CGAx1 and no AD was well tolerated. Sup>sit with mod IND and STS with supervision. Pt will continue to benefit from skilled IPPT for return to PLOF. PT rec d/c home with assist and HHPT.  -AB     Row Name 01/10/23 0829          Therapy Assessment/Plan (PT)    Rehab Potential (PT) good, to achieve stated therapy goals  -AB     Criteria for Skilled Interventions Met (PT) yes;meets criteria;skilled treatment is necessary  -AB     Therapy Frequency (PT) daily  -AB     Row Name 01/10/23 0829          Vital Signs    Pre Systolic BP Rehab 126  -AB     Pre Treatment Diastolic BP 57  -AB     Pretreatment Heart Rate (beats/min) 73  -AB     Posttreatment Heart Rate (beats/min) 76  -AB     Pre SpO2 (%) 93  -AB     O2 Delivery Pre Treatment room air  -AB     O2 Delivery Intra Treatment room air  -AB     Post SpO2 (%) 95  -AB     O2 Delivery Post Treatment room air  -AB     Pre Patient Position Supine  -AB     Intra Patient Position Standing  -AB     Post Patient Position Sitting  -AB     Row Name 01/10/23 0829          Positioning and Restraints    Pre-Treatment Position in bed  -AB     Post Treatment Position chair  -AB     In Chair notified nsg;reclined;sitting;call light within reach;encouraged to call for assist;waffle cushion  RN deferred alarm  -AB           User Key  (r) = Recorded By, (t) = Taken By, (c) = Cosigned By    Initials Name Provider Type    AB Rohini Vizcarra, PT Physical Therapist               Outcome Measures     Row Name 01/10/23 0835 01/10/23 0800       How much help from another person do you currently need...    Turning from your back to your side while in flat bed without using bedrails? 4  -AB 4  -LW    Moving from lying on back to  sitting on the side of a flat bed without bedrails? 4  -AB 4  -LW    Moving to and from a bed to a chair (including a wheelchair)? 4  -AB 4  -LW    Standing up from a chair using your arms (e.g., wheelchair, bedside chair)? 4  -AB 4  -LW    Climbing 3-5 steps with a railing? 3  -AB 3  -LW    To walk in hospital room? 3  -AB 3  -LW    AM-PAC 6 Clicks Score (PT) 22  -AB 22  -LW    Highest level of mobility 7 --> Walked 25 feet or more  -AB 7 --> Walked 25 feet or more  -LW    Row Name 01/10/23 0835          Functional Assessment    Outcome Measure Options AM-PAC 6 Clicks Basic Mobility (PT)  -AB           User Key  (r) = Recorded By, (t) = Taken By, (c) = Cosigned By    Initials Name Provider Type     Jo Ann Vu, RN Registered Nurse    Rohini Rothman, PT Physical Therapist                             Physical Therapy Education     Title: PT OT SLP Therapies (Resolved)     Topic: Physical Therapy (Resolved)     Point: Mobility training (Resolved)     Learning Progress Summary           Patient Acceptance, E,D, VU,DU by AB at 1/10/2023 0835                   Point: Home exercise program (Resolved)     Learning Progress Summary           Patient Acceptance, E,D, VU,DU by AB at 1/10/2023 0835                   Point: Body mechanics (Resolved)     Learning Progress Summary           Patient Acceptance, E,D, VU,DU by AB at 1/10/2023 0835                   Point: Precautions (Resolved)     Learning Progress Summary           Patient Acceptance, E,D, VU,DU by AB at 1/10/2023 0835                               User Key     Initials Effective Dates Name Provider Type Discipline    AB 09/22/22 -  Rohini Vizcarra, PT Physical Therapist PT              PT Recommendation and Plan  Planned Therapy Interventions (PT): balance training, bed mobility training, gait training, home exercise program, patient/family education, strengthening, stretching, transfer training  Plan of Care Reviewed With: patient  Progress: no  change  Outcome Evaluation: PT initial eval completed. Pt presents with mild balance deficits and decreased activity tolerance limiting functional mobility. Ambulation of 150' with CGAx1 and no AD was well tolerated. Sup>sit with mod IND and STS with supervision. Pt will continue to benefit from skilled IPPT for return to PLOF. PT rec d/c home with assist and HHPT.     Time Calculation:    PT Charges     Row Name 01/10/23 0836             Time Calculation    Start Time 0800  -AB      PT Received On 01/10/23  -AB      PT Goal Re-Cert Due Date 01/20/23  -AB         Untimed Charges    PT Eval/Re-eval Minutes 46  -AB         Total Minutes    Untimed Charges Total Minutes 46  -AB       Total Minutes 46  -AB            User Key  (r) = Recorded By, (t) = Taken By, (c) = Cosigned By    Initials Name Provider Type    AB Rohini Vizcarra, PT Physical Therapist              Therapy Charges for Today     Code Description Service Date Service Provider Modifiers Qty    80525409765 HC PT EVAL LOW COMPLEXITY 4 1/10/2023 Rohini Vizcarra, PT GP 1          PT G-Codes  Outcome Measure Options: AM-PAC 6 Clicks Basic Mobility (PT)  AM-PAC 6 Clicks Score (PT): 22  AM-PAC 6 Clicks Score (OT): 20  PT Discharge Summary  Anticipated Discharge Disposition (PT): home with assist, home with home health    Rohini Vizcarra PT  1/10/2023

## 2023-01-10 NOTE — PLAN OF CARE
Goal Outcome Evaluation:  Plan of Care Reviewed With: patient        Progress: no change  Outcome Evaluation: PT initial eval completed. Pt presents with mild balance deficits and decreased activity tolerance limiting functional mobility. Ambulation of 150' with CGAx1 and no AD was well tolerated. Sup>sit with mod IND and STS with supervision. Pt will continue to benefit from skilled IPPT for return to PLOF. PT rec d/c home with assist and HHPT.

## 2023-01-11 ENCOUNTER — TRANSITIONAL CARE MANAGEMENT TELEPHONE ENCOUNTER (OUTPATIENT)
Dept: CALL CENTER | Facility: HOSPITAL | Age: 83
End: 2023-01-11
Payer: MEDICARE

## 2023-01-11 NOTE — OUTREACH NOTE
Call Center TCM Note    Flowsheet Row Responses   [facility] patient discharged from? [facility]   Does the patient have one of the following disease processes/diagnoses(primary or secondary)? General Surgery   TCM attempt successful? Yes   Call start time 1400   Call end time 1404   Discharge diagnosis  ENDOSCOPIC RETROGRADE CHOLANGIOPANCREATOGRAPHY WITH STENT,  Pancreatic mass UTI    Person spoke with today (if not patient) and relationship Patient   Meds reviewed with patient/caregiver? Yes   Does the patient have all medications related to this admission filled (includes all antibiotics, pain medications, etc.) Yes   Prescription comments No concerns or questions.   Is the patient taking all medications as directed (includes completed medication regime)? Yes   Comments Patient aware of hospital fu with pcp on 01/19 @ 130   Does the patient have an appointment with their PCP within 7 days of discharge? Yes   What is the Home health agency?  [facility]   Has home health visited the patient within 72 hours of discharge? Yes   Home health comments Will be coming to the home tomorrow.   Psychosocial issues? No   Did the patient receive a copy of their discharge instructions? Yes   Nursing interventions Reviewed instructions with patient   What is the patient's perception of their health status since discharge? Improving   Nursing interventions Nurse provided patient education   Is the patient/caregiver able to teach back signs and symptoms of incisional infection? --  [Patient had- ERCP stents placed then no incision marks to the outer body.]   Is the patient/caregiver able to teach back steps to recovery at home? Set small, achievable goals for return to baseline health, Rest and rebuild strength, gradually increase activity   Is the patient/caregiver able to teach back the hierarchy of who to call/visit for symptoms/problems? PCP, Specialist, Home health nurse, Urgent Care, ED, 911 Yes   TCM call completed?  Yes   Wrap up additional comments Patient doing well. Will follow up with oncologist to determine if surgery is needed from path results.    Call end time 1404   Would this patient benefit from a Referral to [facility]? No   Is the patient interested in additional calls from an ambulatory ?  NOTE:  applies to high risk patients requiring additional follow-up. No          Leisa Agarwal RN    1/11/2023, 14:10 EST

## 2023-01-12 LAB
BACTERIA SPEC AEROBE CULT: NORMAL
BACTERIA SPEC AEROBE CULT: NORMAL

## 2023-01-16 ENCOUNTER — TELEPHONE (OUTPATIENT)
Dept: GASTROENTEROLOGY | Facility: CLINIC | Age: 83
End: 2023-01-16
Payer: MEDICARE

## 2023-01-16 NOTE — TELEPHONE ENCOUNTER
The brushings taken at the time of ERCP does not show cells consistent with cancer.  Brushings from the common bile duct have a high false negative result rate.           If you have any questions or concerns, please don't hesitate to call.           Sincerely,          Darius Obrien MD

## 2023-01-17 ENCOUNTER — OFFICE VISIT (OUTPATIENT)
Dept: ONCOLOGY | Facility: CLINIC | Age: 83
End: 2023-01-17
Payer: MEDICARE

## 2023-01-17 VITALS
HEART RATE: 74 BPM | BODY MASS INDEX: 23.55 KG/M2 | SYSTOLIC BLOOD PRESSURE: 177 MMHG | RESPIRATION RATE: 18 BRPM | DIASTOLIC BLOOD PRESSURE: 84 MMHG | HEIGHT: 62 IN | TEMPERATURE: 98.3 F | WEIGHT: 128 LBS | OXYGEN SATURATION: 95 %

## 2023-01-17 DIAGNOSIS — E80.6 HYPERBILIRUBINEMIA: ICD-10-CM

## 2023-01-17 DIAGNOSIS — C80.1 BILIARY OBSTRUCTION DUE TO CANCER: ICD-10-CM

## 2023-01-17 DIAGNOSIS — R97.8 ELEVATED CA 19-9 LEVEL: Primary | ICD-10-CM

## 2023-01-17 DIAGNOSIS — K83.1 BILIARY OBSTRUCTION DUE TO CANCER: ICD-10-CM

## 2023-01-17 PROBLEM — K83.8: Status: RESOLVED | Noted: 2023-01-17 | Resolved: 2023-01-17

## 2023-01-17 PROBLEM — K83.8: Status: ACTIVE | Noted: 2023-01-17

## 2023-01-17 PROCEDURE — 99214 OFFICE O/P EST MOD 30 MIN: CPT | Performed by: INTERNAL MEDICINE

## 2023-01-17 RX ORDER — TRAMADOL HYDROCHLORIDE 50 MG/1
50 TABLET ORAL EVERY 8 HOURS PRN
Qty: 90 TABLET | Refills: 0 | Status: SHIPPED | OUTPATIENT
Start: 2023-01-17 | End: 2023-01-17

## 2023-01-17 RX ORDER — B-COMPLEX WITH VITAMIN C
TABLET ORAL
COMMUNITY

## 2023-01-17 RX ORDER — TRAMADOL HYDROCHLORIDE 50 MG/1
50 TABLET ORAL EVERY 8 HOURS PRN
Qty: 90 TABLET | Refills: 0 | Status: SHIPPED | OUTPATIENT
Start: 2023-01-17

## 2023-01-17 RX ORDER — BACLOFEN 10 MG/1
TABLET ORAL
COMMUNITY
End: 2023-01-19 | Stop reason: SDUPTHER

## 2023-01-17 NOTE — PROGRESS NOTES
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 Garcia is an 82-year-old lady with past medical history of asthma, hypertension, arthritis who presented to [facility] with worsening weakness and painless jaundice.  Noted that she had become more yellow over the past week.  States that she had lost at least 5 pounds within the past week.  Previously she was completely independent.  She had also noted some increased malodorous urine.  Her scans were concerning for possible lesion in the pancreas/biliary system with significant ductal dilatation resulting in a bilirubin of 9.9.  She was transferred to [facility] for ERCP and biopsy which was completed yesterday by Dr. Bennett.  Pathology results pending.  She overall feels well today.  Denies any significant abdominal pain or discomfort    Interval History:  Presents to clinic for follow-up.  Has been doing better since her hospitalization.  States that her jaundice is significantly improved.  Still having some overall weight loss but tolerating her diet well.  Notes some increased weakness and fatigue.  Notes continued back pain which is not well controlled with as needed Tylenol.  Denies any nausea, vomiting, diarrhea    Oncology History:    Oncology/Hematology History    No history exists.       Subjective      Review of Systems:   Constitutional: Negative for fevers, chills, or weight loss  Eyes: Negative for blurred vision or discharge         Ear/Nose/Throat: Negative for difficulty swallowing, sore throat, LAD                                                       Respiratory: Negative for cough, SOA, wheezing                                                                                        Cardiovascular: Negative for chest pain or  palpitations                                                                  Gastrointestinal: Negative for nausea, vomiting or diarrhea                                                                     Genitourinary: Negative for dysuria or hematuria                                                                                           Musculoskeletal: Negative for any joint pains or muscle aches                                                                        Neurologic: Negative for any weakness, headaches, dizziness                                                                         Hematologic: Negative for any easy bleeding or bruising                                                                                   Psychiatric: Negative for anxiety or depression                          Past Medical History/Past Surgical History/ Family History/ Social History: Reviewed by me and unchanged from my previous documentation done on January 2022.     Medications:     Current Outpatient Medications:   •  traMADol (ULTRAM) 50 MG tablet, Take 1 tablet by mouth Every 8 (Eight) Hours As Needed for Moderate Pain., Disp: 90 tablet, Rfl: 0  •  baclofen (LIORESAL) 10 MG tablet, baclofen 10 mg tablet  TAKE ONE TABLET BY MOUTH TWICE DAILY AS NEEDED FOR muscle SPASMS, Disp: , Rfl:   •  Calcium Carb-Cholecalciferol (Oyster Shell Calcium w/D) 500-5 MG-MCG tablet, Take 1 tablet by mouth Daily., Disp: , Rfl:   •  Calcium Carbonate-Vitamin D (Oyster Shell Calcium/D) 500-5 MG-MCG tablet, Oyster Shell Calcium-Vitamin D3 500 mg-5 mcg (200 unit) tablet  TAKE ONE TABLET BY MOUTH EVERY DAY, Disp: , Rfl:   •  lactase (Lactaid) 3000 units tablet, Take 1 tablet by mouth 3 (Three) Times a Day With Meals., Disp: 90 tablet, Rfl: 11  •  Lifitegrast (Xiidra) 5 % ophthalmic solution, Xiidra 5 % eye drops in a dropperette, Disp: , Rfl:   •  omeprazole (priLOSEC) 20 MG capsule, Take 20 mg by mouth Daily., Disp: , Rfl:   •   "triamterene-hydrochlorothiazide (MAXZIDE-25) 37.5-25 MG per tablet, TAKE ONE TABLET BY MOUTH EVERY DAY (Patient taking differently: Take 1 tablet by mouth Daily.), Disp: 90 tablet, Rfl: 2    Allergies:   Allergies   Allergen Reactions   • Lactose Intolerance (Gi) GI Intolerance   • Aspirin Hives   • Procaine Hives       Objective     Physical Exam:  Vital Signs:   Vitals:    01/17/23 1404   BP: 177/84   Pulse: 74   Resp: 18   Temp: 98.3 °F (36.8 °C)   TempSrc: Infrared   SpO2: 95%   Weight: 58.1 kg (128 lb)   Height: 157.5 cm (62.01\")   PainSc: 0-No pain     Pain Score    01/17/23 1404   PainSc: 0-No pain     ECOG Performance Status: 2 - Symptomatic, <50% confined to bed    Constitutional: NAD, ECOG 2  Eyes: PERRLA, scleral anicteric  ENT: No LAD, no thyromegaly  Respiratory: CTAB, no wheezing, rales, rhonchi  Cardiovascular: RRR, no murmurs, pulses 2+ bilaterally  Abdomen: soft, NT/ND, no HSM  Musculoskeletal: strength 5/5 bilaterally, no c/c/e  Neurologic: A&O x 3, CN II-XII intact grossly    Results Review:   Admission on 01/07/2023, Discharged on 01/10/2023   Component Date Value Ref Range Status   • Glucose 01/08/2023 106 (H)  65 - 99 mg/dL Final   • BUN 01/08/2023 9  8 - 23 mg/dL Final   • Creatinine 01/08/2023 0.73  0.57 - 1.00 mg/dL Final   • Sodium 01/08/2023 137  136 - 145 mmol/L Final   • Potassium 01/08/2023 3.6  3.5 - 5.2 mmol/L Final   • Chloride 01/08/2023 98  98 - 107 mmol/L Final   • CO2 01/08/2023 25.0  22.0 - 29.0 mmol/L Final   • Calcium 01/08/2023 8.7  8.6 - 10.5 mg/dL Final   • Total Protein 01/08/2023 5.8 (L)  6.0 - 8.5 g/dL Final   • Albumin 01/08/2023 3.1 (L)  3.5 - 5.2 g/dL Final   • ALT (SGPT) 01/08/2023 233 (H)  1 - 33 U/L Final   • AST (SGOT) 01/08/2023 380 (H)  1 - 32 U/L Final   • Alkaline Phosphatase 01/08/2023 424 (H)  39 - 117 U/L Final   • Total Bilirubin 01/08/2023 9.9 (H)  0.0 - 1.2 mg/dL Final   • Globulin 01/08/2023 2.7  gm/dL Final    Calculated Result   • A/G Ratio 01/08/2023 " 1.1  g/dL Final   • BUN/Creatinine Ratio 01/08/2023 12.3  7.0 - 25.0 Final   • Anion Gap 01/08/2023 14.0  5.0 - 15.0 mmol/L Final   • eGFR 01/08/2023 81.2  >60.0 mL/min/1.73 Final    National Kidney Foundation and American Society of Nephrology (ASN) Task Force recommended calculation based on the Chronic Kidney Disease Epidemiology Collaboration (CKD-EPI) equation refit without adjustment for race.   • WBC 01/08/2023 10.47  3.40 - 10.80 10*3/mm3 Final   • RBC 01/08/2023 3.87  3.77 - 5.28 10*6/mm3 Final   • Hemoglobin 01/08/2023 11.3 (L)  12.0 - 15.9 g/dL Final   • Hematocrit 01/08/2023 32.9 (L)  34.0 - 46.6 % Final   • MCV 01/08/2023 85.0  79.0 - 97.0 fL Final   • MCH 01/08/2023 29.2  26.6 - 33.0 pg Final   • MCHC 01/08/2023 34.3  31.5 - 35.7 g/dL Final   • RDW 01/08/2023 16.9 (H)  12.3 - 15.4 % Final   • RDW-SD 01/08/2023 52.1  37.0 - 54.0 fl Final   • MPV 01/08/2023 10.3  6.0 - 12.0 fL Final   • Platelets 01/08/2023 400  140 - 450 10*3/mm3 Final   • Target HR (85%) 01/08/2023 116  bpm Final   • Max. Pred. HR (100%) 01/08/2023 136  bpm Final   • EF(MOD-bp) 01/08/2023 58.4  % Final   • LVIDd 01/08/2023 3.7  cm Final   • LVIDs 01/08/2023 2.40  cm Final   • IVSd 01/08/2023 1.20  cm Final   • LVPWd 01/08/2023 1.20  cm Final   • FS 01/08/2023 35.1  % Final   • IVS/LVPW 01/08/2023 1.00  cm Final   • ESV(cubed) 01/08/2023 13.8  ml Final   • EDV(cubed) 01/08/2023 50.7  ml Final   • LVOT area 01/08/2023 3.1  cm2 Final   • LV mass(C)d 01/08/2023 147.3  grams Final   • LVOT diam 01/08/2023 2.00  cm Final   • EDV(MOD-sp2) 01/08/2023 44.2  ml Final   • EDV(MOD-sp4) 01/08/2023 53.0  ml Final   • ESV(MOD-sp2) 01/08/2023 18.0  ml Final   • ESV(MOD-sp4) 01/08/2023 22.8  ml Final   • SV(MOD-sp2) 01/08/2023 26.2  ml Final   • SV(MOD-sp4) 01/08/2023 30.2  ml Final   • EF(MOD-sp2) 01/08/2023 59.3  % Final   • EF(MOD-sp4) 01/08/2023 57.0  % Final   • MV E max caity 01/08/2023 66.8  cm/sec Final   • MV A max caity 01/08/2023 102.0  cm/sec  Final   • MV dec time 01/08/2023 0.27  msec Final   • MV E/A 01/08/2023 0.65   Final   • LA ESV Index (BP) 01/08/2023 28.2  ml/m2 Final   • Med Peak E' Ean 01/08/2023 5.7  cm/sec Final   • Lat Peak E' Ean 01/08/2023 7.8  cm/sec Final   • Avg E/e' ratio 01/08/2023 9.90   Final   • SV(LVOT) 01/08/2023 71.6  ml Final   • RV Base 01/08/2023 3.2  cm Final   • RV Mid 01/08/2023 2.6  cm Final   • RV Length 01/08/2023 6.5  cm Final   • TAPSE (>1.6) 01/08/2023 1.57  cm Final   • RV S' 01/08/2023 13.2  cm/sec Final   • LA dimension (2D)  01/08/2023 3.2  cm Final   • LV V1 max 01/08/2023 105.5  cm/sec Final   • LV V1 max PG 01/08/2023 4.5  mmHg Final   • LV V1 mean PG 01/08/2023 2.5  mmHg Final   • LV V1 VTI 01/08/2023 22.8  cm Final   • Ao pk ean 01/08/2023 147.0  cm/sec Final   • Ao max PG 01/08/2023 8.6  mmHg Final   • Ao mean PG 01/08/2023 5.0  mmHg Final   • Ao V2 VTI 01/08/2023 30.9  cm Final   • DAYRON(I,D) 01/08/2023 2.32  cm2 Final   • AI P1/2t 01/08/2023 367.3  msec Final   • MV max PG 01/08/2023 8.3  mmHg Final   • MV mean PG 01/08/2023 2.00  mmHg Final   • MV V2 VTI 01/08/2023 34.4  cm Final   • MV P1/2t 01/08/2023 82.9  msec Final   • MVA(P1/2t) 01/08/2023 2.7  cm2 Final   • MVA(VTI) 01/08/2023 2.08  cm2 Final   • MV dec slope 01/08/2023 364.0  cm/sec2 Final   • MR max ean 01/08/2023 431.0  cm/sec Final   • MR max PG 01/08/2023 75.6  mmHg Final   • TR max ean 01/08/2023 286.0  cm/sec Final   • TR max PG 01/08/2023 38  mmHg Final   • PA V2 max 01/08/2023 117.0  cm/sec Final   • PA acc time 01/08/2023 0.11  sec Final   • PA pr(Accel) 01/08/2023 30.0  mmHg Final   • PI end-d ean 01/08/2023 85.9  cm/sec Final   • Ao root diam 01/08/2023 2.8  cm Final   • RVSP(TR) 01/08/2023 41  mmHg Final   • RAP systole 01/08/2023 3  mmHg Final   • CA 19-9 01/08/2023 1,653.0 (H)  <=35.0 U/mL Final   • Reference Lab Report 01/08/2023    Final                    Value:[facility]  [address] [place]   [address]  Phone: [phone] or [phone]  Fax: [phone]  Alvarez Palomino M.D., Medical Director    PATIENT NAME                                    LABORATORY NO.  MARCELA GARZA                                   [id number]  [id number]                                  AGE                  SEX      SSN            CLIENT REF #  [facility]                    82        1940    F                       [id number]  [address] [employer] [place]                       REQUESTING M.D.         ATTENDING M.D..         [place] [address]                         ROSALIA STANFORD  DATE COLLECTED          DATE RECEIVED           DATE REPORTED  2023              2023              01/10/2023    DIAGNOSIS:  BILE DUCT BRUSHING:  Negative for malignant cells.    MICROSCOPIC DESCRIPTION:  Scantly cellular specimen; bland appearing epithelial cells are present.    Professional                           interpretation rendered by Farzana Park D.O., F.C.A.P. at [employer] [employer], [address] [place] [address].    CLINICAL HISTORY:  Painless jaundice  Pancreatic mass  Elevated liver enzymes    SPECIMENS SUBMITTED:  BILE DUCT BRUSHING    GROSS SPECIMEN DESCRIPTION:  30cc of clear colorless fluid with brush present in fixative    CYTOTECHNOLOGIST:         ETB, CT (ASCP)                       REVIEWED, DIAGNOSED AND ELECTRONICALLY  SIGNED BY:    Farzana Park D.O., F.C.A.P.  CPT CODES:  95947     • Glucose 2023 136 (H)  65 - 99 mg/dL Final   • BUN 2023 10  8 - 23 mg/dL Final   • Creatinine 2023 0.73  0.57 - 1.00 mg/dL Final   • Sodium 2023 137  136 - 145 mmol/L Final   • Potassium 2023 3.2 (L)  3.5 - 5.2 mmol/L Final   • Chloride 2023 96 (L)  98 - 107 mmol/L Final   • CO2 2023 27.0  22.0 - 29.0 mmol/L Final   • Calcium 2023 8.6  8.6 - 10.5 mg/dL Final   • Total Protein 2023 6.4  6.0 - 8.5 g/dL Final   • Albumin 2023 3.2  (L)  3.5 - 5.2 g/dL Final   • ALT (SGPT) 01/09/2023 188 (H)  1 - 33 U/L Final   • AST (SGOT) 01/09/2023 189 (H)  1 - 32 U/L Final   • Alkaline Phosphatase 01/09/2023 402 (H)  39 - 117 U/L Final   • Total Bilirubin 01/09/2023 4.8 (H)  0.0 - 1.2 mg/dL Final   • Globulin 01/09/2023 3.2  gm/dL Final    Calculated Result   • A/G Ratio 01/09/2023 1.0  g/dL Final   • BUN/Creatinine Ratio 01/09/2023 13.7  7.0 - 25.0 Final   • Anion Gap 01/09/2023 14.0  5.0 - 15.0 mmol/L Final   • eGFR 01/09/2023 81.2  >60.0 mL/min/1.73 Final    National Kidney Foundation and American Society of Nephrology (ASN) Task Force recommended calculation based on the Chronic Kidney Disease Epidemiology Collaboration (CKD-EPI) equation refit without adjustment for race.   • Lipase 01/09/2023 47  13 - 60 U/L Final   • WBC 01/09/2023 13.13 (H)  3.40 - 10.80 10*3/mm3 Final   • RBC 01/09/2023 3.61 (L)  3.77 - 5.28 10*6/mm3 Final   • Hemoglobin 01/09/2023 10.4 (L)  12.0 - 15.9 g/dL Final   • Hematocrit 01/09/2023 31.4 (L)  34.0 - 46.6 % Final   • MCV 01/09/2023 87.0  79.0 - 97.0 fL Final   • MCH 01/09/2023 28.8  26.6 - 33.0 pg Final   • MCHC 01/09/2023 33.1  31.5 - 35.7 g/dL Final   • RDW 01/09/2023 17.3 (H)  12.3 - 15.4 % Final   • RDW-SD 01/09/2023 54.5 (H)  37.0 - 54.0 fl Final   • MPV 01/09/2023 10.5  6.0 - 12.0 fL Final   • Platelets 01/09/2023 402  140 - 450 10*3/mm3 Final   • Magnesium 01/09/2023 2.9 (H)  1.6 - 2.4 mg/dL Final   • Color, UA 01/09/2023 Dark Yellow (A)  Yellow, Straw Final   • Appearance, UA 01/09/2023 Cloudy (A)  Clear Final   • pH, UA 01/09/2023 5.5  5.0 - 8.0 Final   • Specific Gravity, UA 01/09/2023 1.044 (H)  1.001 - 1.030 Final   • Glucose, UA 01/09/2023 Negative  Negative Final   • Ketones, UA 01/09/2023 Trace (A)  Negative Final   • Bilirubin, UA 01/09/2023 Small (1+) (A)  Negative Final   • Blood, UA 01/09/2023 Negative  Negative Final   • Protein, UA 01/09/2023 Negative  Negative Final   • Leuk Esterase, UA 01/09/2023 Small  (1+) (A)  Negative Final   • Nitrite, UA 01/09/2023 Positive (A)  Negative Final   • Urobilinogen, UA 01/09/2023 0.2 E.U./dL  0.2 - 1.0 E.U./dL Final   • Potassium 01/09/2023 4.4  3.5 - 5.2 mmol/L Final   • RBC, UA 01/09/2023 0-2  None Seen, 0-2 /HPF Final   • WBC, UA 01/09/2023 13-20 (A)  None Seen, 0-2 /HPF Final   • Bacteria, UA 01/09/2023 None Seen  None Seen, Trace /HPF Final   • Squamous Epithelial Cells, UA 01/09/2023 3-6 (A)  None Seen, 0-2 /HPF Final   • Hyaline Casts, UA 01/09/2023 0-6  0 - 6 /LPF Final   • Methodology 01/09/2023 Manual Light Microscopy   Final   Admission on 01/07/2023, Discharged on 01/07/2023   Component Date Value Ref Range Status   • Glucose 01/07/2023 115 (H)  65 - 99 mg/dL Final   • BUN 01/07/2023 11  8 - 23 mg/dL Final   • Creatinine 01/07/2023 0.85  0.57 - 1.00 mg/dL Final   • Sodium 01/07/2023 132 (L)  136 - 145 mmol/L Final   • Potassium 01/07/2023 3.0 (L)  3.5 - 5.2 mmol/L Final   • Chloride 01/07/2023 91 (L)  98 - 107 mmol/L Final   • CO2 01/07/2023 25.6  22.0 - 29.0 mmol/L Final   • Calcium 01/07/2023 10.1  8.6 - 10.5 mg/dL Final   • Total Protein 01/07/2023 8.1  6.0 - 8.5 g/dL Final   • Albumin 01/07/2023 4.0  3.5 - 5.2 g/dL Final   • ALT (SGPT) 01/07/2023 311 (H)  1 - 33 U/L Final   • AST (SGOT) 01/07/2023 468 (H)  1 - 32 U/L Final   • Alkaline Phosphatase 01/07/2023 544 (H)  39 - 117 U/L Final   • Total Bilirubin 01/07/2023 9.7 (H)  0.0 - 1.2 mg/dL Final   • Globulin 01/07/2023 4.1  gm/dL Final   • A/G Ratio 01/07/2023 1.0  g/dL Final   • BUN/Creatinine Ratio 01/07/2023 12.9  7.0 - 25.0 Final   • Anion Gap 01/07/2023 15.4 (H)  5.0 - 15.0 mmol/L Final   • eGFR 01/07/2023 67.7  >60.0 mL/min/1.73 Final    National Kidney Foundation and American Society of Nephrology (ASN) Task Force recommended calculation based on the Chronic Kidney Disease Epidemiology Collaboration (CKD-EPI) equation refit without adjustment for race.   • Troponin T 01/07/2023 <0.010  0.000 - 0.030 ng/mL  Final   • Magnesium 01/07/2023 1.5 (L)  1.6 - 2.4 mg/dL Final   • Color, UA 01/07/2023 Dark Yellow (A)  Yellow, Straw Final   • Appearance, UA 01/07/2023 Cloudy (A)  Clear Final   • pH, UA 01/07/2023 <=5.0  5.0 - 8.0 Final   • Specific Gravity, UA 01/07/2023 >=1.030  1.005 - 1.030 Final   • Glucose, UA 01/07/2023 Negative  Negative Final   • Ketones, UA 01/07/2023 Negative  Negative Final   • Bilirubin, UA 01/07/2023 Large (3+) (A)  Negative Final   • Blood, UA 01/07/2023 Negative  Negative Final   • Protein, UA 01/07/2023 30 mg/dL (1+) (A)  Negative Final   • Leuk Esterase, UA 01/07/2023 Small (1+) (A)  Negative Final   • Nitrite, UA 01/07/2023 Positive (A)  Negative Final   • Urobilinogen, UA 01/07/2023 0.2 E.U./dL  0.2 - 1.0 E.U./dL Final   • Extra Tube 01/07/2023 Hold for add-ons.   Final    Auto resulted.   • Extra Tube 01/07/2023 hold for add-on   Final    Auto resulted   • Extra Tube 01/07/2023 Hold for add-ons.   Final    Auto resulted.   • Extra Tube 01/07/2023 Hold for add-ons.   Final    Auto resulted   • WBC 01/07/2023 11.94 (H)  3.40 - 10.80 10*3/mm3 Final   • RBC 01/07/2023 4.88  3.77 - 5.28 10*6/mm3 Final   • Hemoglobin 01/07/2023 13.9  12.0 - 15.9 g/dL Final   • Hematocrit 01/07/2023 40.7  34.0 - 46.6 % Final   • MCV 01/07/2023 83.4  79.0 - 97.0 fL Final   • MCH 01/07/2023 28.5  26.6 - 33.0 pg Final   • MCHC 01/07/2023 34.2  31.5 - 35.7 g/dL Final   • RDW 01/07/2023 16.7 (H)  12.3 - 15.4 % Final   • RDW-SD 01/07/2023 50.4  37.0 - 54.0 fl Final   • MPV 01/07/2023 10.0  6.0 - 12.0 fL Final   • Platelets 01/07/2023 501 (H)  140 - 450 10*3/mm3 Final   • Neutrophil % 01/07/2023 83.9 (H)  42.7 - 76.0 % Final   • Lymphocyte % 01/07/2023 9.7 (L)  19.6 - 45.3 % Final   • Monocyte % 01/07/2023 4.0 (L)  5.0 - 12.0 % Final   • Eosinophil % 01/07/2023 0.7  0.3 - 6.2 % Final   • Basophil % 01/07/2023 0.4  0.0 - 1.5 % Final   • Immature Grans % 01/07/2023 1.3 (H)  0.0 - 0.5 % Final   • Neutrophils, Absolute  01/07/2023 10.01 (H)  1.70 - 7.00 10*3/mm3 Final   • Lymphocytes, Absolute 01/07/2023 1.16  0.70 - 3.10 10*3/mm3 Final   • Monocytes, Absolute 01/07/2023 0.48  0.10 - 0.90 10*3/mm3 Final   • Eosinophils, Absolute 01/07/2023 0.08  0.00 - 0.40 10*3/mm3 Final   • Basophils, Absolute 01/07/2023 0.05  0.00 - 0.20 10*3/mm3 Final   • Immature Grans, Absolute 01/07/2023 0.16 (H)  0.00 - 0.05 10*3/mm3 Final   • nRBC 01/07/2023 0.0  0.0 - 0.2 /100 WBC Final   • Lactate 01/07/2023 1.5  0.5 - 2.0 mmol/L Final   • Procalcitonin 01/07/2023 0.45 (H)  0.00 - 0.25 ng/mL Final   • Blood Culture 01/07/2023 No growth at 5 days   Final   • Blood Culture 01/07/2023 No growth at 5 days   Final   • RBC, UA 01/07/2023 0-2 (A)  None Seen /HPF Final   • WBC, UA 01/07/2023 6-12 (A)  None Seen /HPF Final   • Bacteria, UA 01/07/2023 4+ (A)  None Seen /HPF Final   • Squamous Epithelial Cells, UA 01/07/2023 0-2  None Seen, 0-2 /HPF Final   • Hyaline Casts, UA 01/07/2023 None Seen  None Seen /LPF Final   • Methodology 01/07/2023 Manual Light Microscopy   Final       Adult Transthoracic Echo Complete W/ Cont if Necessary Per Protocol    Result Date: 1/8/2023  Narrative: •  Left ventricular wall thickness is consistent with mild concentric hypertrophy. •  There is calcification of the aortic valve. •  Moderate tricuspid valve regurgitation is present. •  Estimated right ventricular systolic pressure from tricuspid regurgitation is mildly elevated (35-45 mmHg). •  Normal LV systolic function •  Mild AI; no AS     CT Chest With Contrast Diagnostic    Result Date: 1/9/2023  Narrative: CT CHEST W CONTRAST DIAGNOSTIC Date of Exam: 1/9/2023 1:48 PM EST Indication: biliopancreatic malignancy. Comparison: None available. Technique: Axial CT images were obtained of the chest after the uneventful intravenous administration of 85 cc Isovue-300 .  Reconstructed coronal and sagittal images were also obtained. Automated exposure control and iterative construction  methods were used. Findings: Rita/mediastinum: No adenopathy. Thoracic aorta normal in caliber. Coronary artery calcification. No pericardial effusion. Moderate hiatal hernia, with a gastric diverticulum protruding through the esophageal hiatus back into the abdominal cavity Lungs/pleura: Trace pleural effusions. Postinflammatory fibronodular changes in the lung apices. No suspicious pulmonary nodule. Tiny nodules related to the right major and minor fissures compatible with intrafissural lymph nodes (images 41, 42 44.) Calcified granuloma in the lingula Upper abdomen: Metallic biliary stent present, with pneumobilia and gas in the gallbladder lumen. Wall thickening of the gallbladder Bones/soft tissues: No suspicious bone lesion     Impression: Impression: 1. No evidence of thoracic metastatic disease 2. Trace pleural effusions 3. Calcific coronary atherosclerosis 4. Moderate hiatal hernia 5. Findings related to biliary stent placement, with nonspecific gallbladder wall thickening Electronically Signed: Zac Abbott  1/9/2023 5:51 PM EST  Workstation ID: [id number]    CT Abdomen Pelvis With Contrast    Result Date: 1/7/2023  Narrative: EXAM: CT ABDOMEN PELVIS W CONTRAST-  INDICATION: painless jaundice  TECHNIQUE:  Thin section axial images were obtained from the lung bases to the pubic symphysis following IV contrast administration.  Coronal reconstruction images were obtained from the axial data.  COMPARISON: None.  FINDINGS:  ABDOMEN: The lung bases are clear.   No focal liver lesion is identified. There is intrahepatic biliary ductal dilatation.  The gallbladder is distended. There may be small gallstones. There is focal thickening of the fundus of the gallbladder. The common bile duct is dilated to 16 mm (coronal image 41). There is increased density in the distal duct on CT. Discrete stones are not identified. This increased density could be due to abnormal soft tissue, small layering stones, or a stricture.    The spleen, right adrenal gland, and pancreas are without acute abnormality. There is a small left adrenal nodule.   The kidneys are within normal limits.  There is no hydronephrosis, mass or perinephric stranding.   There is a large hiatal hernia. A small duodenal diverticulum is present. There is no small bowel obstruction.  There is no lymphadenopathy or ascites. Atherosclerotic disease is noted.  PELVIS: The uterus is present.   The appendix is not identified. There is a small round collection of air along the posterior wall of the cecum. Given that there is pandiverticulosis, this is favored to be a diverticulum with a very thin wall.. There are areas of colonic wall thickening which are favored to be related to incomplete distention. There is no pelvic lymphadenopathy or pelvic ascites. However, there is soft tissue air in the extraperitoneal region of the pelvis, inferior to the urinary bladder. There is a small amount of subcutaneous air in both groins. Etiology for this air is unclear. Mild compression deformity of L2 is of age indeterminant..  Scratch      Impression: 1. Intrahepatic and extrahepatic biliary ductal dilatation to the level of the distal common duct where there is abnormal soft tissue density which could be related to a stricture, neoplastic process, or subtle small stones in the distal duct. ERCP is recommended.  2. Abnormal gallbladder with focal thickening of the fundus of the gallbladder as well as probable small layering stones. Adenomyomatosis or neoplasm of the gallbladder not excluded.  3. Abnormal extraperitoneal air in the pelvis of uncertain etiology. No history of recent surgery was provided. Please correlate with any recent instrumentation. No associated free intraperitoneal air.        351.30 mGy.cm    This study was performed with techniques to keep radiation doses as low as reasonably achievable (ALARA). Individualized dose reduction techniques using automated exposure  control or adjustment of mA and/or kV according to the patient size were employed.  This report was signed and finalized on 1/7/2023 3:07 PM by Alexandria Raygoza MD.    [place] [facility] pancreatic and biliary ducts    Result Date: 1/9/2023  Narrative: [place] [facility] PANCREATIC AND BILIARY DUCTS Date of Exam: 1/8/2023 11:11 AM EST Indication: ERCP. Comparison: None available. Technique:  A series of radiographic digital spot films were obtained in conjunction with a endoscopic catheterization of the biliary and pancreatic ductal system, performed by the gastroenterologist. Fluoroscopic Time: 3 minutes 12 seconds Number of Images: 4 Findings: A total of 4 images obtained show endoscope and side cannula placement, contrast injection into the common duct, and subsequent Wallstent placement into common duct. Please see the procedure report for full details.     Impression: Impression: Fluoroscopy provided during ERCP and stent placement. Electronically Signed: Adonis Saxena  1/9/2023 10:20 AM EST  Workstation ID: [id number]    XR Chest 1 View    Result Date: 1/7/2023  Narrative: PROCEDURE: XR CHEST 1 VW-  INDICATION:  Weak/Dizzy/AMS triage protocol  FINDINGS:  A portable view of the chest was obtained.  Comparison is made to a prior exam dated 06/30/2021.   Cardiac and mediastinal silhouettes are normal. Retrocardiac opacity is likely a hiatal hernia. The lungs are clear. There is no pleural effusion or pneumothorax.      Impression: No acute cardiac or pulmonary disease on this portable exam. Hiatal hernia.  This report was signed and finalized on 1/7/2023 3:21 PM by Alexandria Raygoza MD.      Assessment / Plan      Assessment/Plan:   1. 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 William with [facility] or for surgical evaluation and evaluation for further biopsies.  We will discuss the case with him today    2. Elevated CA 19-9 level (Primary)  -     Ambulatory Referral to General Surgery    3. Hyperbilirubinemia   -Improving status post stent placement  -     Ambulatory Referral to General Surgery    4.  Back pain  -Likely secondary to her malignancy  -I have ordered tramadol 50 mg every 8 hours as needed    -     Ambulatory Referral to General Surgery  -     Discontinue: traMADol (ULTRAM) 50 MG tablet; Take 1 tablet by mouth Every 8 (Eight) Hours As Needed for Moderate Pain.  Dispense: 90 tablet; Refill: 0  -     traMADol (ULTRAM) 50 MG tablet; Take 1 tablet by mouth Every 8 (Eight) Hours As Needed for Moderate Pain.  Dispense: 90 tablet; Refill: 0         Follow Up:   Follow-up in 2 months or sooner if needed     Santiago Ang MD  Hematology and Oncology     Please note that portions of this note may have been completed with a voice recognition program. Efforts were made to edit the dictations, but occasionally words are mistranscribed.

## 2023-01-18 ENCOUNTER — OUTSIDE FACILITY SERVICE (OUTPATIENT)
Dept: INTERNAL MEDICINE | Facility: CLINIC | Age: 83
End: 2023-01-18
Payer: MEDICARE

## 2023-01-18 PROCEDURE — G0180 MD CERTIFICATION HHA PATIENT: HCPCS | Performed by: NURSE PRACTITIONER

## 2023-01-19 ENCOUNTER — OFFICE VISIT (OUTPATIENT)
Dept: INTERNAL MEDICINE | Facility: CLINIC | Age: 83
End: 2023-01-19
Payer: MEDICARE

## 2023-01-19 VITALS
TEMPERATURE: 98.8 F | OXYGEN SATURATION: 95 % | SYSTOLIC BLOOD PRESSURE: 120 MMHG | DIASTOLIC BLOOD PRESSURE: 78 MMHG | HEIGHT: 62 IN | BODY MASS INDEX: 23.74 KG/M2 | HEART RATE: 78 BPM | WEIGHT: 129 LBS

## 2023-01-19 DIAGNOSIS — G89.29 CHRONIC MIDLINE LOW BACK PAIN WITHOUT SCIATICA: ICD-10-CM

## 2023-01-19 DIAGNOSIS — R79.9 ABNORMAL BLOOD CHEMISTRY: ICD-10-CM

## 2023-01-19 DIAGNOSIS — R17 PAINLESS JAUNDICE: ICD-10-CM

## 2023-01-19 DIAGNOSIS — Z09 HOSPITAL DISCHARGE FOLLOW-UP: Primary | ICD-10-CM

## 2023-01-19 DIAGNOSIS — E83.42 HYPOMAGNESEMIA: ICD-10-CM

## 2023-01-19 DIAGNOSIS — M54.50 CHRONIC MIDLINE LOW BACK PAIN WITHOUT SCIATICA: ICD-10-CM

## 2023-01-19 DIAGNOSIS — E80.6 HYPERBILIRUBINEMIA: ICD-10-CM

## 2023-01-19 DIAGNOSIS — R82.90 ABNORMAL URINALYSIS: ICD-10-CM

## 2023-01-19 DIAGNOSIS — K86.89 PANCREATIC MASS: ICD-10-CM

## 2023-01-19 DIAGNOSIS — R97.8 ELEVATED CA 19-9 LEVEL: ICD-10-CM

## 2023-01-19 LAB
BILIRUB BLD-MCNC: ABNORMAL MG/DL
CLARITY, POC: ABNORMAL
COLOR UR: YELLOW
EXPIRATION DATE: ABNORMAL
GLUCOSE UR STRIP-MCNC: NEGATIVE MG/DL
KETONES UR QL: NEGATIVE
LEUKOCYTE EST, POC: ABNORMAL
Lab: ABNORMAL
NITRITE UR-MCNC: NEGATIVE MG/ML
PH UR: 6 [PH] (ref 5–8)
PROT UR STRIP-MCNC: NEGATIVE MG/DL
RBC # UR STRIP: NEGATIVE /UL
SP GR UR: 1.03 (ref 1–1.03)
UROBILINOGEN UR QL: NORMAL

## 2023-01-19 PROCEDURE — 1111F DSCHRG MED/CURRENT MED MERGE: CPT | Performed by: NURSE PRACTITIONER

## 2023-01-19 PROCEDURE — 99495 TRANSJ CARE MGMT MOD F2F 14D: CPT | Performed by: NURSE PRACTITIONER

## 2023-01-19 PROCEDURE — 81003 URINALYSIS AUTO W/O SCOPE: CPT | Performed by: NURSE PRACTITIONER

## 2023-01-19 RX ORDER — BACLOFEN 10 MG/1
10 TABLET ORAL 2 TIMES DAILY PRN
Qty: 30 TABLET | Refills: 0 | Status: SHIPPED | OUTPATIENT
Start: 2023-01-19

## 2023-01-19 NOTE — PROGRESS NOTES
"Transitional Care Follow Up Visit  Subjective     Nicole Garcia is a 82 y.o. female who presents for a transitional care management visit.    Within 48 business hours after discharge our office contacted her via telephone to coordinate her care and needs.      I reviewed and discussed the details of that call along with the discharge summary, hospital problems, inpatient lab results, inpatient diagnostic studies, and consultation reports with Nicole.     Current outpatient and discharge medications have been reconciled for the patient.  Reviewed by: Leisa Fournier, JAYDEN      Date of TCM Phone Call 1/10/2023   [facility]   Date of Admission 1/7/2023   Date of Discharge 1/3/2023   [place] [place] for [place] (LACE) Score: 9 (1/10/2023  6:00 AM)      History of Present Illness   Course During Hospital Stay:      Nicole Garcia is a 82 y.o. female who presented ER 1/7/2023 with painless jaundice.  Work-up demonstrated a pancreatic mass, cholelithiasis, initial concern for cholangitis/cholecystitis.  She was seen by GI on 1/8/2023 and underwent ERCP with biliary stent placement.  Findings include a 3 cm malignant appearing stricture and dilation of the common bile duct.  A 10 mm x 60 mm stent was placed and brushings were obtained and sent for pathology which is currently pending.  On 1/9/2023 she was seen by oncology who recommended follow-up in the clinic in 1 week for pathology results and further discussions on treatment options.  She was predominantly on antibiotics for concern of cholecystitis/cholangitis, per documentation this was recently discontinued.  There is mention of UTI, no urine culture appears to have been sent, repeat UA 1/9 demonstrated pyuria only and additionally was a contaminated sample.  She was discharged home with oncology and GI follow-up in the clinic. Saw oncologist 2 days ago which includes the following plan per Dr. Ang :     \"1. 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 William with  or for surgical evaluation and evaluation for further biopsies.  We will discuss the case with him today     2. Elevated CA 19-9 level (Primary)  -     Ambulatory Referral to General Surgery     3. Hyperbilirubinemia   -Improving status post stent placement  -     Ambulatory Referral to General Surgery     4.  Back pain  -Likely secondary to her malignancy  -I have ordered tramadol 50 mg every 8 hours as needed\"    Patient states the tramadol helps minimally with the pain. Baclofen was sent but she is not taking this.     Granddaughter is concerned due to her continuing to feel tired. Will recheck urine today as she had some pyuria on discharge but specimen was contaminated so did not treat for UTI. Declines symptoms of UTI. No confusion.      The following portions of the patient's history were reviewed and updated as appropriate: allergies, current medications, past family history, past medical history, past social history, past surgical history and problem list.    Lab Review           Admission on 01/07/2023, Discharged on 01/10/2023   Component Date Value Ref Range Status    • Glucose 01/08/2023 106 (H)  65 - 99 mg/dL Final   • BUN 01/08/2023 9  8 - 23 mg/dL Final   • Creatinine 01/08/2023 0.73  0.57 - 1.00 mg/dL Final   • Sodium 01/08/2023 137  136 - 145 mmol/L Final   • Potassium 01/08/2023 3.6  3.5 - 5.2 mmol/L Final   • Chloride 01/08/2023 98  98 - 107 mmol/L Final   • CO2 01/08/2023 25.0  22.0 - 29.0 mmol/L Final   • " Calcium 01/08/2023 8.7  8.6 - 10.5 mg/dL Final   • Total Protein 01/08/2023 5.8 (L)  6.0 - 8.5 g/dL Final   • Albumin 01/08/2023 3.1 (L)  3.5 - 5.2 g/dL Final   • ALT (SGPT) 01/08/2023 233 (H)  1 - 33 U/L Final   • AST (SGOT) 01/08/2023 380 (H)  1 - 32 U/L Final   • Alkaline Phosphatase 01/08/2023 424 (H)  39 - 117 U/L Final   • Total Bilirubin 01/08/2023 9.9 (H)  0.0 - 1.2 mg/dL Final   • Globulin 01/08/2023 2.7  gm/dL Final     Calculated Result   • A/G Ratio 01/08/2023 1.1  g/dL Final   • BUN/Creatinine Ratio 01/08/2023 12.3  7.0 - 25.0 Final   • Anion Gap 01/08/2023 14.0  5.0 - 15.0 mmol/L Final   • eGFR 01/08/2023 81.2  >60.0 mL/min/1.73 Final     National Kidney Foundation and American Society of Nephrology (ASN) Task Force recommended calculation based on the Chronic Kidney Disease Epidemiology Collaboration (CKD-EPI) equation refit without adjustment for race.   • WBC 01/08/2023 10.47  3.40 - 10.80 10*3/mm3 Final   • RBC 01/08/2023 3.87  3.77 - 5.28 10*6/mm3 Final   • Hemoglobin 01/08/2023 11.3 (L)  12.0 - 15.9 g/dL Final   • Hematocrit 01/08/2023 32.9 (L)  34.0 - 46.6 % Final   • MCV 01/08/2023 85.0  79.0 - 97.0 fL Final   • MCH 01/08/2023 29.2  26.6 - 33.0 pg Final   • MCHC 01/08/2023 34.3  31.5 - 35.7 g/dL Final   • RDW 01/08/2023 16.9 (H)  12.3 - 15.4 % Final   • RDW-SD 01/08/2023 52.1  37.0 - 54.0 fl Final   • MPV 01/08/2023 10.3  6.0 - 12.0 fL Final   • Platelets 01/08/2023 400  140 - 450 10*3/mm3 Final   • Target HR (85%) 01/08/2023 116  bpm Final   • Max. Pred. HR (100%) 01/08/2023 136  bpm Final   • EF(MOD-bp) 01/08/2023 58.4  % Final   • LVIDd 01/08/2023 3.7  cm Final   • LVIDs 01/08/2023 2.40  cm Final   • IVSd 01/08/2023 1.20  cm Final   • LVPWd 01/08/2023 1.20  cm Final   • FS 01/08/2023 35.1  % Final   • IVS/LVPW 01/08/2023 1.00  cm Final   • ESV(cubed) 01/08/2023 13.8  ml Final   • EDV(cubed) 01/08/2023 50.7  ml Final   • LVOT area 01/08/2023 3.1  cm2 Final   • LV mass(C)d 01/08/2023 147.3   grams Final   • LVOT diam 01/08/2023 2.00  cm Final   • EDV(MOD-sp2) 01/08/2023 44.2  ml Final   • EDV(MOD-sp4) 01/08/2023 53.0  ml Final   • ESV(MOD-sp2) 01/08/2023 18.0  ml Final   • ESV(MOD-sp4) 01/08/2023 22.8  ml Final   • SV(MOD-sp2) 01/08/2023 26.2  ml Final   • SV(MOD-sp4) 01/08/2023 30.2  ml Final   • EF(MOD-sp2) 01/08/2023 59.3  % Final   • EF(MOD-sp4) 01/08/2023 57.0  % Final   • MV E max ean 01/08/2023 66.8  cm/sec Final   • MV A max ean 01/08/2023 102.0  cm/sec Final   • MV dec time 01/08/2023 0.27  msec Final   • MV E/A 01/08/2023 0.65    Final   • LA ESV Index (BP) 01/08/2023 28.2  ml/m2 Final   • Med Peak E' Ean 01/08/2023 5.7  cm/sec Final   • Lat Peak E' Ean 01/08/2023 7.8  cm/sec Final   • Avg E/e' ratio 01/08/2023 9.90    Final   • SV(LVOT) 01/08/2023 71.6  ml Final   • RV Base 01/08/2023 3.2  cm Final   • RV Mid 01/08/2023 2.6  cm Final   • RV Length 01/08/2023 6.5  cm Final   • TAPSE (>1.6) 01/08/2023 1.57  cm Final   • RV S' 01/08/2023 13.2  cm/sec Final   • LA dimension (2D)  01/08/2023 3.2  cm Final   • LV V1 max 01/08/2023 105.5  cm/sec Final   • LV V1 max PG 01/08/2023 4.5  mmHg Final   • LV V1 mean PG 01/08/2023 2.5  mmHg Final   • LV V1 VTI 01/08/2023 22.8  cm Final   • Ao pk ean 01/08/2023 147.0  cm/sec Final   • Ao max PG 01/08/2023 8.6  mmHg Final   • Ao mean PG 01/08/2023 5.0  mmHg Final   • Ao V2 VTI 01/08/2023 30.9  cm Final   • DAYRON(I,D) 01/08/2023 2.32  cm2 Final   • AI P1/2t 01/08/2023 367.3  msec Final   • MV max PG 01/08/2023 8.3  mmHg Final   • MV mean PG 01/08/2023 2.00  mmHg Final   • MV V2 VTI 01/08/2023 34.4  cm Final   • MV P1/2t 01/08/2023 82.9  msec Final   • MVA(P1/2t) 01/08/2023 2.7  cm2 Final   • MVA(VTI) 01/08/2023 2.08  cm2 Final   • MV dec slope 01/08/2023 364.0  cm/sec2 Final   • MR max ean 01/08/2023 431.0  cm/sec Final   • MR max PG 01/08/2023 75.6  mmHg Final   • TR max ean 01/08/2023 286.0  cm/sec Final   • TR max PG 01/08/2023 38  mmHg Final   • PA V2 max  2023 117.0  cm/sec Final   • PA acc time 2023 0.11  sec Final   • PA pr(Accel) 2023 30.0  mmHg Final   • PI end-d caity 2023 85.9  cm/sec Final   • Ao root diam 2023 2.8  cm Final   • RVSP(TR) 2023 41  mmHg Final   • RAP systole 2023 3  mmHg Final   • CA 19-9 2023 1,653.0 (H)  <=35.0 U/mL Final   • Reference Lab Report 2023     Final                    Value:[facility]  [address] [place] [address]  Phone: [phone] or [phone]  Fax: [phone]  Alvarez Palomino M.D., Medical Director     PATIENT NAME                                    LABORATORY NO.  MARCELA GARZA                                   [id number]  [id number]                                  AGE                  SEX      SSN            CLIENT REF #  [facility]                    82        1940    F                       [id number]  [id number]                       REQUESTING M.D.         ATTENDING M.D..         [place] [address]                         ROSALIA STANFORD  DATE COLLECTED          DATE RECEIVED           DATE REPORTED  2023              2023              01/10/2023     DIAGNOSIS:  BILE DUCT BRUSHING:  Negative for malignant cells.     MICROSCOPIC DESCRIPTION:  Scantly cellular specimen; bland appearing epithelial cells are present.     Professional                           interpretation rendered by Farzana Park D.O., F.C.A.P. at [employer], [address] [place] [address].     CLINICAL HISTORY:  Painless jaundice  Pancreatic mass  Elevated liver enzymes     SPECIMENS SUBMITTED:  BILE DUCT BRUSHING     GROSS SPECIMEN DESCRIPTION:  30cc of clear colorless fluid with brush present in fixative     CYTOTECHNOLOGIST:         FARAZ GARCIA (ASCP)                       REVIEWED, DIAGNOSED AND ELECTRONICALLY  SIGNED BY:     Farzana Park D.O., F.C.A.P.  CPT CODES:  65809       • Glucose 01/09/2023 136 (H)  65 - 99 mg/dL Final   • BUN 01/09/2023 10  8 - 23 mg/dL Final   • Creatinine 01/09/2023 0.73  0.57 - 1.00 mg/dL Final   • Sodium 01/09/2023 137  136 - 145 mmol/L Final   • Potassium 01/09/2023 3.2 (L)  3.5 - 5.2 mmol/L Final   • Chloride 01/09/2023 96 (L)  98 - 107 mmol/L Final   • CO2 01/09/2023 27.0  22.0 - 29.0 mmol/L Final   • Calcium 01/09/2023 8.6  8.6 - 10.5 mg/dL Final   • Total Protein 01/09/2023 6.4  6.0 - 8.5 g/dL Final   • Albumin 01/09/2023 3.2 (L)  3.5 - 5.2 g/dL Final   • ALT (SGPT) 01/09/2023 188 (H)  1 - 33 U/L Final   • AST (SGOT) 01/09/2023 189 (H)  1 - 32 U/L Final   • Alkaline Phosphatase 01/09/2023 402 (H)  39 - 117 U/L Final   • Total Bilirubin 01/09/2023 4.8 (H)  0.0 - 1.2 mg/dL Final   • Globulin 01/09/2023 3.2  gm/dL Final     Calculated Result   • A/G Ratio 01/09/2023 1.0  g/dL Final   • BUN/Creatinine Ratio 01/09/2023 13.7  7.0 - 25.0 Final   • Anion Gap 01/09/2023 14.0  5.0 - 15.0 mmol/L Final   • eGFR 01/09/2023 81.2  >60.0 mL/min/1.73 Final     National Kidney Foundation and American Society of Nephrology (ASN) Task Force recommended calculation based on the Chronic Kidney Disease Epidemiology Collaboration (CKD-EPI) equation refit without adjustment for race.   • Lipase 01/09/2023 47  13 - 60 U/L Final   • WBC 01/09/2023 13.13 (H)  3.40 - 10.80 10*3/mm3 Final   • RBC 01/09/2023 3.61 (L)  3.77 - 5.28 10*6/mm3 Final   • Hemoglobin 01/09/2023 10.4 (L)  12.0 - 15.9 g/dL Final   • Hematocrit 01/09/2023 31.4 (L)  34.0 - 46.6 % Final   • MCV 01/09/2023 87.0  79.0 - 97.0 fL Final   • MCH 01/09/2023 28.8  26.6 - 33.0 pg Final   • MCHC 01/09/2023 33.1  31.5 - 35.7 g/dL Final   • RDW 01/09/2023 17.3 (H)  12.3 - 15.4 % Final   • RDW-SD 01/09/2023 54.5 (H)  37.0 - 54.0 fl Final   • MPV 01/09/2023 10.5  6.0 - 12.0 fL Final   • Platelets 01/09/2023 402  140 - 450 10*3/mm3 Final   • Magnesium 01/09/2023 2.9 (H)  1.6 - 2.4 mg/dL Final   • Color, UA 01/09/2023 Dark  Yellow (A)  Yellow, Straw Final   • Appearance, UA 01/09/2023 Cloudy (A)  Clear Final   • pH, UA 01/09/2023 5.5  5.0 - 8.0 Final   • Specific Gravity, UA 01/09/2023 1.044 (H)  1.001 - 1.030 Final   • Glucose, UA 01/09/2023 Negative  Negative Final   • Ketones, UA 01/09/2023 Trace (A)  Negative Final   • Bilirubin, UA 01/09/2023 Small (1+) (A)  Negative Final   • Blood, UA 01/09/2023 Negative  Negative Final   • Protein, UA 01/09/2023 Negative  Negative Final   • Leuk Esterase, UA 01/09/2023 Small (1+) (A)  Negative Final   • Nitrite, UA 01/09/2023 Positive (A)  Negative Final   • Urobilinogen, UA 01/09/2023 0.2 E.U./dL  0.2 - 1.0 E.U./dL Final   • Potassium 01/09/2023 4.4  3.5 - 5.2 mmol/L Final   • RBC, UA 01/09/2023 0-2  None Seen, 0-2 /HPF Final   • WBC, UA 01/09/2023 13-20 (A)  None Seen, 0-2 /HPF Final   • Bacteria, UA 01/09/2023 None Seen  None Seen, Trace /HPF Final   • Squamous Epithelial Cells, UA 01/09/2023 3-6 (A)  None Seen, 0-2 /HPF Final   • Hyaline Casts, UA 01/09/2023 0-6  0 - 6 /LPF Final   • Methodology 01/09/2023 Manual Light Microscopy    Final   Admission on 01/07/2023, Discharged on 01/07/2023   Component Date Value Ref Range Status    • Glucose 01/07/2023 115 (H)  65 - 99 mg/dL Final   • BUN 01/07/2023 11  8 - 23 mg/dL Final   • Creatinine 01/07/2023 0.85  0.57 - 1.00 mg/dL Final   • Sodium 01/07/2023 132 (L)  136 - 145 mmol/L Final   • Potassium 01/07/2023 3.0 (L)  3.5 - 5.2 mmol/L Final   • Chloride 01/07/2023 91 (L)  98 - 107 mmol/L Final   • CO2 01/07/2023 25.6  22.0 - 29.0 mmol/L Final   • Calcium 01/07/2023 10.1  8.6 - 10.5 mg/dL Final   • Total Protein 01/07/2023 8.1  6.0 - 8.5 g/dL Final   • Albumin 01/07/2023 4.0  3.5 - 5.2 g/dL Final   • ALT (SGPT) 01/07/2023 311 (H)  1 - 33 U/L Final   • AST (SGOT) 01/07/2023 468 (H)  1 - 32 U/L Final   • Alkaline Phosphatase 01/07/2023 544 (H)  39 - 117 U/L Final   • Total Bilirubin 01/07/2023 9.7 (H)  0.0 - 1.2 mg/dL Final   • Globulin 01/07/2023  4.1  gm/dL Final   • A/G Ratio 01/07/2023 1.0  g/dL Final   • BUN/Creatinine Ratio 01/07/2023 12.9  7.0 - 25.0 Final   • Anion Gap 01/07/2023 15.4 (H)  5.0 - 15.0 mmol/L Final   • eGFR 01/07/2023 67.7  >60.0 mL/min/1.73 Final     National Kidney Foundation and American Society of Nephrology (ASN) Task Force recommended calculation based on the Chronic Kidney Disease Epidemiology Collaboration (CKD-EPI) equation refit without adjustment for race.   • Troponin T 01/07/2023 <0.010  0.000 - 0.030 ng/mL Final   • Magnesium 01/07/2023 1.5 (L)  1.6 - 2.4 mg/dL Final   • Color, UA 01/07/2023 Dark Yellow (A)  Yellow, Straw Final   • Appearance, UA 01/07/2023 Cloudy (A)  Clear Final   • pH, UA 01/07/2023 <=5.0  5.0 - 8.0 Final   • Specific Gravity, UA 01/07/2023 >=1.030  1.005 - 1.030 Final   • Glucose, UA 01/07/2023 Negative  Negative Final   • Ketones, UA 01/07/2023 Negative  Negative Final   • Bilirubin, UA 01/07/2023 Large (3+) (A)  Negative Final   • Blood, UA 01/07/2023 Negative  Negative Final   • Protein, UA 01/07/2023 30 mg/dL (1+) (A)  Negative Final   • Leuk Esterase, UA 01/07/2023 Small (1+) (A)  Negative Final   • Nitrite, UA 01/07/2023 Positive (A)  Negative Final   • Urobilinogen, UA 01/07/2023 0.2 E.U./dL  0.2 - 1.0 E.U./dL Final   • Extra Tube 01/07/2023 Hold for add-ons.    Final     Auto resulted.   • Extra Tube 01/07/2023 hold for add-on    Final     Auto resulted   • Extra Tube 01/07/2023 Hold for add-ons.    Final     Auto resulted.   • Extra Tube 01/07/2023 Hold for add-ons.    Final     Auto resulted   • WBC 01/07/2023 11.94 (H)  3.40 - 10.80 10*3/mm3 Final   • RBC 01/07/2023 4.88  3.77 - 5.28 10*6/mm3 Final   • Hemoglobin 01/07/2023 13.9  12.0 - 15.9 g/dL Final   • Hematocrit 01/07/2023 40.7  34.0 - 46.6 % Final   • MCV 01/07/2023 83.4  79.0 - 97.0 fL Final   • MCH 01/07/2023 28.5  26.6 - 33.0 pg Final   • MCHC 01/07/2023 34.2  31.5 - 35.7 g/dL Final   • RDW 01/07/2023 16.7 (H)  12.3 - 15.4 % Final    • RDW-SD 01/07/2023 50.4  37.0 - 54.0 fl Final   • MPV 01/07/2023 10.0  6.0 - 12.0 fL Final   • Platelets 01/07/2023 501 (H)  140 - 450 10*3/mm3 Final   • Neutrophil % 01/07/2023 83.9 (H)  42.7 - 76.0 % Final   • Lymphocyte % 01/07/2023 9.7 (L)  19.6 - 45.3 % Final   • Monocyte % 01/07/2023 4.0 (L)  5.0 - 12.0 % Final   • Eosinophil % 01/07/2023 0.7  0.3 - 6.2 % Final   • Basophil % 01/07/2023 0.4  0.0 - 1.5 % Final   • Immature Grans % 01/07/2023 1.3 (H)  0.0 - 0.5 % Final   • Neutrophils, Absolute 01/07/2023 10.01 (H)  1.70 - 7.00 10*3/mm3 Final   • Lymphocytes, Absolute 01/07/2023 1.16  0.70 - 3.10 10*3/mm3 Final   • Monocytes, Absolute 01/07/2023 0.48  0.10 - 0.90 10*3/mm3 Final   • Eosinophils, Absolute 01/07/2023 0.08  0.00 - 0.40 10*3/mm3 Final   • Basophils, Absolute 01/07/2023 0.05  0.00 - 0.20 10*3/mm3 Final   • Immature Grans, Absolute 01/07/2023 0.16 (H)  0.00 - 0.05 10*3/mm3 Final   • nRBC 01/07/2023 0.0  0.0 - 0.2 /100 WBC Final   • Lactate 01/07/2023 1.5  0.5 - 2.0 mmol/L Final   • Procalcitonin 01/07/2023 0.45 (H)  0.00 - 0.25 ng/mL Final   • Blood Culture 01/07/2023 No growth at 5 days    Final   • Blood Culture 01/07/2023 No growth at 5 days    Final   • RBC, UA 01/07/2023 0-2 (A)  None Seen /HPF Final   • WBC, UA 01/07/2023 6-12 (A)  None Seen /HPF Final   • Bacteria, UA 01/07/2023 4+ (A)  None Seen /HPF Final   • Squamous Epithelial Cells, UA 01/07/2023 0-2  None Seen, 0-2 /HPF Final   • Hyaline Casts, UA 01/07/2023 None Seen  None Seen /LPF Final   • Methodology 01/07/2023 Manual Light Microscopy    Final         Imaging Review  Adult Transthoracic Echo Complete W/ Cont if Necessary Per Protocol     Result Date: 1/8/2023  Narrative: •  Left ventricular wall thickness is consistent with mild concentric hypertrophy. •  There is calcification of the aortic valve. •  Moderate tricuspid valve regurgitation is present. •  Estimated right ventricular systolic pressure from tricuspid regurgitation is  mildly elevated (35-45 mmHg). •  Normal LV systolic function •  Mild AI; no AS      CT Chest With Contrast Diagnostic     Result Date: 1/9/2023  Narrative: CT CHEST W CONTRAST DIAGNOSTIC Date of Exam: 1/9/2023 1:48 PM EST Indication: biliopancreatic malignancy. Comparison: None available. Technique: Axial CT images were obtained of the chest after the uneventful intravenous administration of 85 cc Isovue-300 .  Reconstructed coronal and sagittal images were also obtained. Automated exposure control and iterative construction methods were used. Findings: Rita/mediastinum: No adenopathy. Thoracic aorta normal in caliber. Coronary artery calcification. No pericardial effusion. Moderate hiatal hernia, with a gastric diverticulum protruding through the esophageal hiatus back into the abdominal cavity Lungs/pleura: Trace pleural effusions. Postinflammatory fibronodular changes in the lung apices. No suspicious pulmonary nodule. Tiny nodules related to the right major and minor fissures compatible with intrafissural lymph nodes (images 41, 42 44.) Calcified granuloma in the lingula Upper abdomen: Metallic biliary stent present, with pneumobilia and gas in the gallbladder lumen. Wall thickening of the gallbladder Bones/soft tissues: No suspicious bone lesion      Impression: Impression: 1. No evidence of thoracic metastatic disease 2. Trace pleural effusions 3. Calcific coronary atherosclerosis 4. Moderate hiatal hernia 5. Findings related to biliary stent placement, with nonspecific gallbladder wall thickening Electronically Signed: Zac Abbott  1/9/2023 5:51 PM EST  Workstation ID: [id number]     CT Abdomen Pelvis With Contrast     Result Date: 1/7/2023  Narrative: EXAM: CT ABDOMEN PELVIS W CONTRAST-  INDICATION: painless jaundice  TECHNIQUE:  Thin section axial images were obtained from the lung bases to the pubic symphysis following IV contrast administration.  Coronal reconstruction images were obtained from the axial  data.  COMPARISON: None.  FINDINGS:  ABDOMEN: The lung bases are clear.   No focal liver lesion is identified. There is intrahepatic biliary ductal dilatation.  The gallbladder is distended. There may be small gallstones. There is focal thickening of the fundus of the gallbladder. The common bile duct is dilated to 16 mm (coronal image 41). There is increased density in the distal duct on CT. Discrete stones are not identified. This increased density could be due to abnormal soft tissue, small layering stones, or a stricture.   The spleen, right adrenal gland, and pancreas are without acute abnormality. There is a small left adrenal nodule.   The kidneys are within normal limits.  There is no hydronephrosis, mass or perinephric stranding.   There is a large hiatal hernia. A small duodenal diverticulum is present. There is no small bowel obstruction.  There is no lymphadenopathy or ascites. Atherosclerotic disease is noted.  PELVIS: The uterus is present.   The appendix is not identified. There is a small round collection of air along the posterior wall of the cecum. Given that there is pandiverticulosis, this is favored to be a diverticulum with a very thin wall.. There are areas of colonic wall thickening which are favored to be related to incomplete distention. There is no pelvic lymphadenopathy or pelvic ascites. However, there is soft tissue air in the extraperitoneal region of the pelvis, inferior to the urinary bladder. There is a small amount of subcutaneous air in both groins. Etiology for this air is unclear. Mild compression deformity of L2 is of age indeterminant..  Scratch       Impression: 1. Intrahepatic and extrahepatic biliary ductal dilatation to the level of the distal common duct where there is abnormal soft tissue density which could be related to a stricture, neoplastic process, or subtle small stones in the distal duct. ERCP is recommended.  2. Abnormal gallbladder with focal thickening of the  fundus of the gallbladder as well as probable small layering stones. Adenomyomatosis or neoplasm of the gallbladder not excluded.  3. Abnormal extraperitoneal air in the pelvis of uncertain etiology. No history of recent surgery was provided. Please correlate with any recent instrumentation. No associated free intraperitoneal air.        351.30 mGy.cm    This study was performed with techniques to keep radiation doses as low as reasonably achievable (ALARA). Individualized dose reduction techniques using automated exposure control or adjustment of mA and/or kV according to the patient size were employed.  This report was signed and finalized on 1/7/2023 3:07 PM by Alexandria Raygoza MD.     FL ERCP pancreatic and biliary ducts     Result Date: 1/9/2023  Narrative: FL ERCP PANCREATIC AND BILIARY DUCTS Date of Exam: 1/8/2023 11:11 AM EST Indication: ERCP. Comparison: None available. Technique:  A series of radiographic digital spot films were obtained in conjunction with a endoscopic catheterization of the biliary and pancreatic ductal system, performed by the gastroenterologist. Fluoroscopic Time: 3 minutes 12 seconds Number of Images: 4 Findings: A total of 4 images obtained show endoscope and side cannula placement, contrast injection into the common duct, and subsequent Wallstent placement into common duct. Please see the procedure report for full details.      Impression: Impression: Fluoroscopy provided during ERCP and stent placement. Electronically Signed: Adonis Saxena  1/9/2023 10:20 AM EST  Workstation ID: [id number]     XR Chest 1 View     Result Date: 1/7/2023  Narrative: PROCEDURE: XR CHEST 1 VW-  INDICATION:  Weak/Dizzy/AMS triage protocol  FINDINGS:  A portable view of the chest was obtained.  Comparison is made to a prior exam dated 06/30/2021.   Cardiac and mediastinal silhouettes are normal. Retrocardiac opacity is likely a hiatal hernia. The lungs are clear. There is no pleural effusion or pneumothorax.        Impression: No acute cardiac or pulmonary disease on this portable exam. Hiatal hernia.  This report was signed and finalized on 1/7/2023 3:21 PM by Alexandria Raygoza MD.      Review of Systems   Constitutional: Positive for fatigue. Negative for chills and fever.   Genitourinary: Negative for dysuria, frequency, hematuria, pelvic pain and urgency.   Musculoskeletal: Positive for back pain.   Skin: Positive for color change (jaundice, improving).   All other systems reviewed and are negative.    Res  Objective   Physical Exam  Vitals and nursing note reviewed.   Constitutional:       General: She is not in acute distress.     Appearance: Normal appearance. She is not ill-appearing or diaphoretic.      Interventions: Face mask in place.   HENT:      Head: Normocephalic and atraumatic.      Right Ear: External ear normal.      Left Ear: External ear normal.   Eyes:      Extraocular Movements: Extraocular movements intact.   Cardiovascular:      Rate and Rhythm: Normal rate and regular rhythm.   Pulmonary:      Effort: Pulmonary effort is normal.      Breath sounds: Normal breath sounds.   Musculoskeletal:         General: Normal range of motion.      Cervical back: Normal range of motion.      Thoracic back: Spasms present. No tenderness. Normal range of motion.   Skin:     General: Skin is dry.   Neurological:      Mental Status: She is alert and oriented to person, place, and time.   Psychiatric:         Mood and Affect: Mood normal.       Brief Urine Lab Results  (Last result in the past 365 days)      Color   Clarity   Blood   Leuk Est   Nitrite   Protein   CREAT   Urine HCG        01/19/23 1555 Yellow   Slightly Cloudy   Negative   Moderate (2+)   Negative   Negative                 Assessment & Plan   Diagnoses and all orders for this visit:    1. Hospital discharge follow-up (Primary)  -     CBC No Differential  -     Comprehensive Metabolic Panel  -     Magnesium    2. Painless jaundice  -     CBC No  Differential  -     Comprehensive Metabolic Panel    3. Hyperbilirubinemia  -     CBC No Differential  -     Comprehensive Metabolic Panel    4. Elevated CA 19-9 level  -     CBC No Differential  -     Comprehensive Metabolic Panel    5. Pancreatic mass  -     CBC No Differential  -     Comprehensive Metabolic Panel    6. Abnormal blood chemistry  -     CBC No Differential  -     Comprehensive Metabolic Panel    7. Hypomagnesemia  -     Magnesium    8. Chronic midline low back pain without sciatica  -     baclofen (LIORESAL) 10 MG tablet; Take 1 tablet by mouth 2 (Two) Times a Day As Needed for Muscle Spasms.  Dispense: 30 tablet; Refill: 0  -     Diclofenac Sodium (VOLTAREN) 1 % gel gel; Apply 4 g topically to the appropriate area as directed 4 (Four) Times a Day As Needed (as needed pain).  Dispense: 100 g; Refill: 0    9. Abnormal urinalysis  -     POCT urinalysis dipstick, automated  -     Urine Culture - Urine, Urine, Clean Catch      Discussion Summary  Keep follow up Oncology, Surgical Oncology, Gastroenterologist upcoming appointments.  Repeat CBC, CMP, Magnesium today to trend levels.   Back pain - declines lidocaine patches. Will try some voltaren and baclofen along with tramadol prescribed by oncology. Heating pad and stretches recommended. Has ASA allergy when ingesting - if rash or skin irritation develops with gel notify clinic and stop use.   UA negative for blood, 2+ leukocytes. Will send for culture and treat if needed based on results. Pt is asymptomatic of UTI.        JAYDEN Jones

## 2023-01-20 ENCOUNTER — READMISSION MANAGEMENT (OUTPATIENT)
Dept: CALL CENTER | Facility: HOSPITAL | Age: 83
End: 2023-01-20
Payer: MEDICARE

## 2023-01-20 LAB
ALBUMIN SERPL-MCNC: 4.8 G/DL (ref 3.6–4.6)
ALBUMIN/GLOB SERPL: 1.7 {RATIO} (ref 1.2–2.2)
ALP SERPL-CCNC: 165 IU/L (ref 44–121)
ALT SERPL-CCNC: 51 IU/L (ref 0–32)
AST SERPL-CCNC: 31 IU/L (ref 0–40)
BILIRUB SERPL-MCNC: 1.7 MG/DL (ref 0–1.2)
BUN SERPL-MCNC: 12 MG/DL (ref 8–27)
BUN/CREAT SERPL: 15 (ref 12–28)
CALCIUM SERPL-MCNC: 10.4 MG/DL (ref 8.7–10.3)
CHLORIDE SERPL-SCNC: 93 MMOL/L (ref 96–106)
CO2 SERPL-SCNC: 24 MMOL/L (ref 20–29)
CREAT SERPL-MCNC: 0.82 MG/DL (ref 0.57–1)
EGFRCR SERPLBLD CKD-EPI 2021: 70 ML/MIN/1.73
ERYTHROCYTE [DISTWIDTH] IN BLOOD BY AUTOMATED COUNT: 14.9 % (ref 11.7–15.4)
GLOBULIN SER CALC-MCNC: 2.8 G/DL (ref 1.5–4.5)
GLUCOSE SERPL-MCNC: 122 MG/DL (ref 70–99)
HCT VFR BLD AUTO: 37.9 % (ref 34–46.6)
HGB BLD-MCNC: 12.6 G/DL (ref 11.1–15.9)
MAGNESIUM SERPL-MCNC: 1.7 MG/DL (ref 1.6–2.3)
MCH RBC QN AUTO: 28.4 PG (ref 26.6–33)
MCHC RBC AUTO-ENTMCNC: 33.2 G/DL (ref 31.5–35.7)
MCV RBC AUTO: 86 FL (ref 79–97)
PLATELET # BLD AUTO: 439 X10E3/UL (ref 150–450)
POTASSIUM SERPL-SCNC: 4 MMOL/L (ref 3.5–5.2)
PROT SERPL-MCNC: 7.6 G/DL (ref 6–8.5)
RBC # BLD AUTO: 4.43 X10E6/UL (ref 3.77–5.28)
SODIUM SERPL-SCNC: 136 MMOL/L (ref 134–144)
WBC # BLD AUTO: 10.2 X10E3/UL (ref 3.4–10.8)

## 2023-01-20 NOTE — PROGRESS NOTES
Bilirubin and liver enzymes continuing to decrease.  White count back to normal, complete blood count overall normal.  Magnesium level normal.    Waiting on urine culture to rule out urinary tract infection, may take a few more days

## 2023-01-20 NOTE — OUTREACH NOTE
General Surgery Week 2 Survey    Flowsheet Row Responses   [facility] patient discharged from? [place]   Does the patient have one of the following disease processes/diagnoses(primary or secondary)? General Surgery   Week 2 attempt successful? Yes   Call start time 1518   Call end time 1524   Discharge diagnosis  ENDOSCOPIC RETROGRADE CHOLANGIOPANCREATOGRAPHY WITH STENT,  Pancreatic mass UTI    Person spoke with today (if not patient) and relationship Patient   Meds reviewed with patient/caregiver? Yes   Is the patient having any side effects they believe may be caused by any medication additions or changes? No   Does the patient have all medications related to this admission filled (includes all antibiotics, pain medications, etc.) N/A   Is the patient taking all medications as directed (includes completed medication regime)? Yes   Does the patient have a follow up appointment scheduled with their surgeon? Yes   Has the patient kept scheduled appointments due by today? Yes   Comments Had PCP fu appt on 1/19/23   What is the Home health agency?  [facility]   Has home health visited the patient within 72 hours of discharge? Yes   Psychosocial issues? No   Did the patient receive a copy of their discharge instructions? Yes   Nursing interventions Reviewed instructions with patient   What is the patient's perception of their health status since discharge? Improving   Nursing interventions Nurse provided patient education   Is the patient/caregiver able to teach back steps to recovery at home? Rest and rebuild strength, gradually increase activity, Eat a well-balance diet   Is the patient/caregiver able to teach back the hierarchy of who to call/visit for symptoms/problems? PCP, Specialist, Home health nurse, Urgent Care, ED, 911 Yes   Week 2 call completed? Yes   Is the patient interested in additional calls from an ambulatory ?  NOTE:  applies to high risk patients requiring additional follow-up.  No   Wrap up additional comments Pt states she is/has been having pain, and PCP order pain medication. Pt is not able to open the bottle so she is waiting for her neice to come over to open.           MANNY CABELLO - Registered Nurse

## 2023-01-25 LAB
BACTERIA UR CULT: ABNORMAL
BACTERIA UR CULT: ABNORMAL
OTHER ANTIBIOTIC SUSC ISLT: ABNORMAL

## 2023-01-25 RX ORDER — NITROFURANTOIN 25; 75 MG/1; MG/1
100 CAPSULE ORAL 2 TIMES DAILY
Qty: 14 CAPSULE | Refills: 0 | Status: SHIPPED | OUTPATIENT
Start: 2023-01-25 | End: 2023-02-01

## 2023-01-26 NOTE — PROGRESS NOTES
Call pt and let her know urine culture grew bacteria, I am sending her antibiotics to [facility], take twice daily x 7 days and push lots of water

## 2023-01-28 ENCOUNTER — TELEMEDICINE (OUTPATIENT)
Dept: FAMILY MEDICINE CLINIC | Facility: TELEHEALTH | Age: 83
End: 2023-01-28
Payer: MEDICARE

## 2023-01-28 DIAGNOSIS — M25.562 ACUTE PAIN OF LEFT KNEE: Primary | ICD-10-CM

## 2023-01-28 PROCEDURE — 99213 OFFICE O/P EST LOW 20 MIN: CPT | Performed by: NURSE PRACTITIONER

## 2023-01-28 RX ORDER — METHYLPREDNISOLONE 4 MG/1
TABLET ORAL
Qty: 21 TABLET | Refills: 0 | Status: SHIPPED | OUTPATIENT
Start: 2023-01-28

## 2023-01-28 NOTE — PROGRESS NOTES
CHIEF COMPLAINT  Chief Complaint   Patient presents with   • Knee Pain     Left patient reports a gout flare         HPI  Nicole Garcia is a 82 y.o. female  presents with complaint of left knee pain that she reports as a gout attack. She has this in November and was treated with a medrol dose pack which resolved the pain. She it taking tylenol and she has ultram for pain. She is drinking cherry juice which has helped as well. She report mild warmth and swelling without redness of her left knee.    Review of Systems   Constitutional: Negative.    Musculoskeletal: Positive for arthralgias and joint swelling.        Left knee pain with swelling and warmth to touch   Psychiatric/Behavioral: Negative.        Past Medical History:   Diagnosis Date   • Arthritis    • Asthma    • Hypertension        Family History   Problem Relation Age of Onset   • Kidney disease Mother    • Asthma Mother    • Kidney disease Father    • Kidney disease Sister    • Asthma Sister    • Kidney disease Brother    • Asthma Brother    • Asthma Son        Social History     Socioeconomic History   • Marital status:    Tobacco Use   • Smoking status: Never   • Smokeless tobacco: Never   Vaping Use   • Vaping Use: Never used   Substance and Sexual Activity   • Alcohol use: Never   • Drug use: Never   • Sexual activity: Defer         There were no vitals taken for this visit.    PHYSICAL EXAM  Physical Exam   Constitutional: She is oriented to person, place, and time. She appears well-developed and well-nourished. She does not have a sickly appearance. She does not appear ill. No distress.   HENT:   Head: Normocephalic and atraumatic.   Mouth/Throat: Mouth/Lips are normal.  Neurological: She is alert and oriented to person, place, and time.   Psychiatric: She has a normal mood and affect.   Vitals reviewed.      Results for orders placed or performed in visit on 01/19/23   Urine Culture - Urine, Urine, Clean Catch    Specimen: Urine, Clean Catch     UC   Result Value Ref Range    Urine Culture Final report (A)     Result 1 Enterococcus faecalis (A)     Susceptibility Testing Comment    CBC No Differential    Specimen: Blood   Result Value Ref Range    WBC 10.2 3.4 - 10.8 x10E3/uL    RBC 4.43 3.77 - 5.28 x10E6/uL    Hemoglobin 12.6 11.1 - 15.9 g/dL    Hematocrit 37.9 34.0 - 46.6 %    MCV 86 79 - 97 fL    MCH 28.4 26.6 - 33.0 pg    MCHC 33.2 31.5 - 35.7 g/dL    RDW 14.9 11.7 - 15.4 %    Platelets 439 150 - 450 x10E3/uL   Comprehensive Metabolic Panel    Specimen: Blood   Result Value Ref Range    Glucose 122 (H) 70 - 99 mg/dL    BUN 12 8 - 27 mg/dL    Creatinine 0.82 0.57 - 1.00 mg/dL    EGFR Result 70 >59 mL/min/1.73    BUN/Creatinine Ratio 15 12 - 28    Sodium 136 134 - 144 mmol/L    Potassium 4.0 3.5 - 5.2 mmol/L    Chloride 93 (L) 96 - 106 mmol/L    Total CO2 24 20 - 29 mmol/L    Calcium 10.4 (H) 8.7 - 10.3 mg/dL    Total Protein 7.6 6.0 - 8.5 g/dL    Albumin 4.8 (H) 3.6 - 4.6 g/dL    Globulin 2.8 1.5 - 4.5 g/dL    A/G Ratio 1.7 1.2 - 2.2    Total Bilirubin 1.7 (H) 0.0 - 1.2 mg/dL    Alkaline Phosphatase 165 (H) 44 - 121 IU/L    AST (SGOT) 31 0 - 40 IU/L    ALT (SGPT) 51 (H) 0 - 32 IU/L   Magnesium    Specimen: Blood   Result Value Ref Range    Magnesium 1.7 1.6 - 2.3 mg/dL   POCT urinalysis dipstick, automated    Specimen: Urine   Result Value Ref Range    Color Yellow Yellow, Straw, Dark Yellow, Nano    Clarity, UA Slightly Cloudy (A) Clear    Specific Gravity  1.030 1.005 - 1.030    pH, Urine 6.0 5.0 - 8.0    Leukocytes Moderate (2+) (A) Negative    Nitrite, UA Negative Negative    Protein, POC Negative Negative mg/dL    Glucose, UA Negative Negative mg/dL    Ketones, UA Negative Negative    Urobilinogen, UA Normal Normal, 0.2 E.U./dL    Bilirubin Small (1+) (A) Negative    Blood, UA Negative Negative    Lot Number [id number]     Expiration Date 3,252,024        Diagnoses and all orders for this visit:    1. Acute pain of left knee (Primary)  -      methylPREDNISolone (MEDROL) 4 MG dose pack; Take as directed on package instructions.  Dispense: 21 tablet; Refill: 0    Advised to continue cherry juice.  Advised to take Tylenol as directed prn pain.   Follow up with PCP.   Avoid IBU while taking steroids.   May use Diclofenac gel bid if no relief from Tylenol.     The use of a video visit has been reviewed with the patient and verbal informd consent has een obtained. Myself and Nicole Garcia participated in this visit. The patient is located in [address] [place] [address]. I am located in [place], [place]. Mychart and Zoom were utilized. I spent 15 minutes in the patient's chart for this visit.           Marlene Deng, APRN  01/28/2023  09:07 EST

## 2023-01-30 ENCOUNTER — READMISSION MANAGEMENT (OUTPATIENT)
Dept: CALL CENTER | Facility: HOSPITAL | Age: 83
End: 2023-01-30
Payer: MEDICARE

## 2023-01-30 NOTE — OUTREACH NOTE
General Surgery Week 2 Survey    Flowsheet Row Responses   [facility] patient discharged from? [place]   Does the patient have one of the following disease processes/diagnoses(primary or secondary)? General Surgery   Call start time 1242   Call end time 1244   Discharge diagnosis  ENDOSCOPIC RETROGRADE CHOLANGIOPANCREATOGRAPHY WITH STENT,  Pancreatic mass UTI    Person spoke with today (if not patient) and relationship Patient   Meds reviewed with patient/caregiver? Yes   Prescription comments No concerns or questions.   Is the patient taking all medications as directed (includes completed medication regime)? Yes   Does the patient have a follow up appointment scheduled with their surgeon? Yes   Has the patient kept scheduled appointments due by today? N/A   Comments Patient has surgeon fu next week and has fu with her pcp.   Psychosocial issues? No   Did the patient receive a copy of their discharge instructions? Yes   Nursing interventions Reviewed instructions with patient   What is the patient's perception of their health status since discharge? Improving   Nursing interventions Nurse provided patient education   Is the patient/caregiver able to teach back steps to recovery at home? Rest and rebuild strength, gradually increase activity, Eat a well-balance diet   Is the patient/caregiver able to teach back the hierarchy of who to call/visit for symptoms/problems? PCP, Specialist, Home health nurse, Urgent Care, ED, 911 Yes   Is the patient interested in additional calls from an ambulatory ?  NOTE:  applies to high risk patients requiring additional follow-up. No   Wrap up additional comments Patient had Gout attack last week but seen pcp and doing much better now. Overall doing well          LISA RAHMAN - Registered Nurse

## 2023-04-19 ENCOUNTER — TELEPHONE (OUTPATIENT)
Dept: INTERNAL MEDICINE | Facility: CLINIC | Age: 83
End: 2023-04-19
Payer: MEDICARE

## 2023-04-19 RX ORDER — ONDANSETRON 4 MG/1
4 TABLET, FILM COATED ORAL EVERY 8 HOURS PRN
Qty: 30 TABLET | Refills: 2 | Status: SHIPPED | OUTPATIENT
Start: 2023-04-19

## 2023-04-19 NOTE — TELEPHONE ENCOUNTER
Caller: Nicole Garcia    Relationship: Self    Best call back number: [phone]     What was the call regarding: PATIENT CALLED STATING THAT ILYA BUSTAMANTE WITH [employer] WAS SUPPOSE TO REQUEST A PRESCRIPTION FOR NAUSEA.  PATIENT  WAS CALLING TO CHECK THE STATUS OF THIS REQUEST.     Do you require a callback: YES    [facility] [address] [phone] [facility] [phone] FX

## 2023-04-19 NOTE — TELEPHONE ENCOUNTER
Spoke with patient, states she has been dx with pancreatic cancer and is having difficulty eating/keeping food down.

## 2023-04-19 NOTE — TELEPHONE ENCOUNTER
Rx Refill Note  Requested Prescriptions     Pending Prescriptions Disp Refills   • ondansetron (Zofran) 4 MG tablet 10 tablet 2     Sig: Take 1 tablet by mouth Every 8 (Eight) Hours As Needed for Nausea or Vomiting.      Last office visit with prescribing clinician: 10/17/2022   Last telemedicine visit with prescribing clinician: Visit date not found   Next office visit with prescribing clinician: 10/18/2023                         Would you like a call back once the refill request has been completed: [] Yes [] No    If the office needs to give you a call back, can they leave a voicemail: [] Yes [] No    Mago Barragan LPN  04/19/23, 14:37 EDT

## 2023-05-19 ENCOUNTER — TELEPHONE (OUTPATIENT)
Dept: INTERNAL MEDICINE | Facility: CLINIC | Age: 83
End: 2023-05-19
Payer: MEDICARE

## 2023-05-19 NOTE — TELEPHONE ENCOUNTER
Patient's daughter-in-law requested handicap sticker/placard for patient due to persistent weakness, fatigue that has developed since she has been diagnosed with pancreatic cancer.  Paperwork filled out, granddaughter to .

## 2023-06-06 ENCOUNTER — APPOINTMENT (OUTPATIENT)
Dept: CT IMAGING | Facility: HOSPITAL | Age: 83
End: 2023-06-06
Payer: MEDICARE

## 2023-06-06 ENCOUNTER — HOSPITAL ENCOUNTER (OUTPATIENT)
Facility: HOSPITAL | Age: 83
Setting detail: OBSERVATION
Discharge: NURSING, REHAB OR LONG-TERM CARE | End: 2023-06-09
Attending: STUDENT IN AN ORGANIZED HEALTH CARE EDUCATION/TRAINING PROGRAM
Payer: MEDICARE

## 2023-06-06 ENCOUNTER — APPOINTMENT (OUTPATIENT)
Dept: GENERAL RADIOLOGY | Facility: HOSPITAL | Age: 83
End: 2023-06-06
Payer: MEDICARE

## 2023-06-06 DIAGNOSIS — C80.1 BILIARY OBSTRUCTION DUE TO CANCER: ICD-10-CM

## 2023-06-06 DIAGNOSIS — K83.1 BILIARY OBSTRUCTION DUE TO CANCER: ICD-10-CM

## 2023-06-06 DIAGNOSIS — R53.1 WEAKNESS: Primary | ICD-10-CM

## 2023-06-06 PROBLEM — C25.9: Status: ACTIVE | Noted: 2023-01-01

## 2023-06-06 LAB
ALBUMIN SERPL-MCNC: 3.6 G/DL (ref 3.5–5.2)
ALBUMIN/GLOB SERPL: 0.9 G/DL
ALP SERPL-CCNC: 349 U/L (ref 39–117)
ALT SERPL W P-5'-P-CCNC: 14 U/L (ref 1–33)
ANION GAP SERPL CALCULATED.3IONS-SCNC: 14 MMOL/L (ref 5–15)
AST SERPL-CCNC: 20 U/L (ref 1–32)
BASOPHILS # BLD AUTO: 0.06 10*3/MM3 (ref 0–0.2)
BASOPHILS NFR BLD AUTO: 0.5 % (ref 0–1.5)
BILIRUB SERPL-MCNC: 1.1 MG/DL (ref 0–1.2)
BUN SERPL-MCNC: 13 MG/DL (ref 8–23)
BUN/CREAT SERPL: 22 (ref 7–25)
CALCIUM SPEC-SCNC: 9.2 MG/DL (ref 8.6–10.5)
CHLORIDE SERPL-SCNC: 97 MMOL/L (ref 98–107)
CO2 SERPL-SCNC: 26 MMOL/L (ref 22–29)
CREAT SERPL-MCNC: 0.59 MG/DL (ref 0.57–1)
DEPRECATED RDW RBC AUTO: 53 FL (ref 37–54)
EGFRCR SERPLBLD CKD-EPI 2021: 89 ML/MIN/1.73
EOSINOPHIL # BLD AUTO: 0.07 10*3/MM3 (ref 0–0.4)
EOSINOPHIL NFR BLD AUTO: 0.6 % (ref 0.3–6.2)
ERYTHROCYTE [DISTWIDTH] IN BLOOD BY AUTOMATED COUNT: 17.5 % (ref 12.3–15.4)
GLOBULIN UR ELPH-MCNC: 4 GM/DL
GLUCOSE SERPL-MCNC: 123 MG/DL (ref 65–99)
HCT VFR BLD AUTO: 34.1 % (ref 34–46.6)
HGB BLD-MCNC: 11.3 G/DL (ref 12–15.9)
HOLD SPECIMEN: NORMAL
HOLD SPECIMEN: NORMAL
IMM GRANULOCYTES # BLD AUTO: 0.05 10*3/MM3 (ref 0–0.05)
IMM GRANULOCYTES NFR BLD AUTO: 0.4 % (ref 0–0.5)
LYMPHOCYTES # BLD AUTO: 0.77 10*3/MM3 (ref 0.7–3.1)
LYMPHOCYTES NFR BLD AUTO: 6.9 % (ref 19.6–45.3)
MCH RBC QN AUTO: 27.6 PG (ref 26.6–33)
MCHC RBC AUTO-ENTMCNC: 33.1 G/DL (ref 31.5–35.7)
MCV RBC AUTO: 83.2 FL (ref 79–97)
MONOCYTES # BLD AUTO: 0.75 10*3/MM3 (ref 0.1–0.9)
MONOCYTES NFR BLD AUTO: 6.7 % (ref 5–12)
NEUTROPHILS NFR BLD AUTO: 84.9 % (ref 42.7–76)
NEUTROPHILS NFR BLD AUTO: 9.48 10*3/MM3 (ref 1.7–7)
NRBC BLD AUTO-RTO: 0 /100 WBC (ref 0–0.2)
PLATELET # BLD AUTO: 208 10*3/MM3 (ref 140–450)
PMV BLD AUTO: 9.9 FL (ref 6–12)
POTASSIUM SERPL-SCNC: 3.4 MMOL/L (ref 3.5–5.2)
PROT SERPL-MCNC: 7.6 G/DL (ref 6–8.5)
RBC # BLD AUTO: 4.1 10*6/MM3 (ref 3.77–5.28)
SODIUM SERPL-SCNC: 137 MMOL/L (ref 136–145)
URATE SERPL-MCNC: 2.8 MG/DL (ref 2.4–5.7)
WBC NRBC COR # BLD: 11.18 10*3/MM3 (ref 3.4–10.8)
WHOLE BLOOD HOLD COAG: NORMAL
WHOLE BLOOD HOLD SPECIMEN: NORMAL

## 2023-06-06 PROCEDURE — 99285 EMERGENCY DEPT VISIT HI MDM: CPT

## 2023-06-06 PROCEDURE — 73090 X-RAY EXAM OF FOREARM: CPT

## 2023-06-06 PROCEDURE — 84550 ASSAY OF BLOOD/URIC ACID: CPT | Performed by: FAMILY MEDICINE

## 2023-06-06 PROCEDURE — 36415 COLL VENOUS BLD VENIPUNCTURE: CPT

## 2023-06-06 PROCEDURE — 80053 COMPREHEN METABOLIC PANEL: CPT | Performed by: STUDENT IN AN ORGANIZED HEALTH CARE EDUCATION/TRAINING PROGRAM

## 2023-06-06 PROCEDURE — 72125 CT NECK SPINE W/O DYE: CPT

## 2023-06-06 PROCEDURE — 73562 X-RAY EXAM OF KNEE 3: CPT

## 2023-06-06 PROCEDURE — 73630 X-RAY EXAM OF FOOT: CPT

## 2023-06-06 PROCEDURE — 25010000002 ENOXAPARIN PER 10 MG: Performed by: FAMILY MEDICINE

## 2023-06-06 PROCEDURE — 96366 THER/PROPH/DIAG IV INF ADDON: CPT

## 2023-06-06 PROCEDURE — G0378 HOSPITAL OBSERVATION PER HR: HCPCS

## 2023-06-06 PROCEDURE — 99222 1ST HOSP IP/OBS MODERATE 55: CPT | Performed by: FAMILY MEDICINE

## 2023-06-06 PROCEDURE — 73110 X-RAY EXAM OF WRIST: CPT

## 2023-06-06 PROCEDURE — 96372 THER/PROPH/DIAG INJ SC/IM: CPT

## 2023-06-06 PROCEDURE — 70450 CT HEAD/BRAIN W/O DYE: CPT

## 2023-06-06 PROCEDURE — 85025 COMPLETE CBC W/AUTO DIFF WBC: CPT | Performed by: STUDENT IN AN ORGANIZED HEALTH CARE EDUCATION/TRAINING PROGRAM

## 2023-06-06 RX ORDER — MIRTAZAPINE 15 MG/1
15 TABLET, FILM COATED ORAL NIGHTLY
COMMUNITY

## 2023-06-06 RX ORDER — HYDROCODONE BITARTRATE AND ACETAMINOPHEN 5; 325 MG/1; MG/1
1 TABLET ORAL EVERY 6 HOURS PRN
Status: DISCONTINUED | OUTPATIENT
Start: 2023-06-06 | End: 2023-06-08 | Stop reason: SDUPTHER

## 2023-06-06 RX ORDER — SODIUM CHLORIDE 9 MG/ML
50 INJECTION, SOLUTION INTRAVENOUS CONTINUOUS
Status: DISCONTINUED | OUTPATIENT
Start: 2023-06-06 | End: 2023-06-09 | Stop reason: HOSPADM

## 2023-06-06 RX ORDER — PROCHLORPERAZINE MALEATE 10 MG
10 TABLET ORAL EVERY 6 HOURS PRN
COMMUNITY
End: 2023-06-09 | Stop reason: HOSPADM

## 2023-06-06 RX ORDER — OXYCODONE HYDROCHLORIDE AND ACETAMINOPHEN 5; 325 MG/1; MG/1
1 TABLET ORAL EVERY 6 HOURS PRN
COMMUNITY
End: 2023-06-09 | Stop reason: HOSPADM

## 2023-06-06 RX ORDER — ACETAMINOPHEN 650 MG/1
650 SUPPOSITORY RECTAL EVERY 4 HOURS PRN
Status: DISCONTINUED | OUTPATIENT
Start: 2023-06-06 | End: 2023-06-09 | Stop reason: HOSPADM

## 2023-06-06 RX ORDER — SODIUM CHLORIDE 0.9 % (FLUSH) 0.9 %
10 SYRINGE (ML) INJECTION AS NEEDED
Status: DISCONTINUED | OUTPATIENT
Start: 2023-06-06 | End: 2023-06-09 | Stop reason: HOSPADM

## 2023-06-06 RX ORDER — MIRTAZAPINE 15 MG/1
15 TABLET, FILM COATED ORAL NIGHTLY
Status: DISCONTINUED | OUTPATIENT
Start: 2023-06-06 | End: 2023-06-09 | Stop reason: HOSPADM

## 2023-06-06 RX ORDER — ACETAMINOPHEN 160 MG/5ML
650 SOLUTION ORAL EVERY 4 HOURS PRN
Status: DISCONTINUED | OUTPATIENT
Start: 2023-06-06 | End: 2023-06-09 | Stop reason: HOSPADM

## 2023-06-06 RX ORDER — ACETAMINOPHEN 325 MG/1
650 TABLET ORAL EVERY 4 HOURS PRN
Status: DISCONTINUED | OUTPATIENT
Start: 2023-06-06 | End: 2023-06-09 | Stop reason: HOSPADM

## 2023-06-06 RX ORDER — PANTOPRAZOLE SODIUM 40 MG/1
40 TABLET, DELAYED RELEASE ORAL
Status: DISCONTINUED | OUTPATIENT
Start: 2023-06-07 | End: 2023-06-09 | Stop reason: HOSPADM

## 2023-06-06 RX ORDER — SODIUM CHLORIDE 0.9 % (FLUSH) 0.9 %
10 SYRINGE (ML) INJECTION EVERY 12 HOURS SCHEDULED
Status: DISCONTINUED | OUTPATIENT
Start: 2023-06-06 | End: 2023-06-09 | Stop reason: HOSPADM

## 2023-06-06 RX ORDER — SODIUM CHLORIDE 9 MG/ML
40 INJECTION, SOLUTION INTRAVENOUS AS NEEDED
Status: DISCONTINUED | OUTPATIENT
Start: 2023-06-06 | End: 2023-06-09 | Stop reason: HOSPADM

## 2023-06-06 RX ORDER — POLYETHYLENE GLYCOL 3350 17 G/17G
17 POWDER, FOR SOLUTION ORAL DAILY PRN
Status: DISCONTINUED | OUTPATIENT
Start: 2023-06-06 | End: 2023-06-09 | Stop reason: HOSPADM

## 2023-06-06 RX ORDER — BISACODYL 10 MG
10 SUPPOSITORY, RECTAL RECTAL DAILY PRN
Status: DISCONTINUED | OUTPATIENT
Start: 2023-06-06 | End: 2023-06-09 | Stop reason: HOSPADM

## 2023-06-06 RX ORDER — ONDANSETRON 2 MG/ML
4 INJECTION INTRAMUSCULAR; INTRAVENOUS EVERY 6 HOURS PRN
Status: DISCONTINUED | OUTPATIENT
Start: 2023-06-06 | End: 2023-06-09 | Stop reason: HOSPADM

## 2023-06-06 RX ORDER — AMOXICILLIN 250 MG
2 CAPSULE ORAL 2 TIMES DAILY
Status: DISCONTINUED | OUTPATIENT
Start: 2023-06-06 | End: 2023-06-09 | Stop reason: HOSPADM

## 2023-06-06 RX ORDER — CHOLECALCIFEROL (VITAMIN D3) 125 MCG
5 CAPSULE ORAL NIGHTLY PRN
Status: DISCONTINUED | OUTPATIENT
Start: 2023-06-06 | End: 2023-06-09 | Stop reason: HOSPADM

## 2023-06-06 RX ORDER — TRAMADOL HYDROCHLORIDE 50 MG/1
50 TABLET ORAL EVERY 8 HOURS PRN
Status: DISCONTINUED | OUTPATIENT
Start: 2023-06-06 | End: 2023-06-09 | Stop reason: HOSPADM

## 2023-06-06 RX ORDER — POTASSIUM CHLORIDE 20 MEQ/1
40 TABLET, EXTENDED RELEASE ORAL EVERY 4 HOURS
Status: COMPLETED | OUTPATIENT
Start: 2023-06-06 | End: 2023-06-07

## 2023-06-06 RX ORDER — ENOXAPARIN SODIUM 100 MG/ML
30 INJECTION SUBCUTANEOUS DAILY
Status: DISCONTINUED | OUTPATIENT
Start: 2023-06-06 | End: 2023-06-09 | Stop reason: HOSPADM

## 2023-06-06 RX ORDER — BISACODYL 5 MG/1
5 TABLET, DELAYED RELEASE ORAL DAILY PRN
Status: DISCONTINUED | OUTPATIENT
Start: 2023-06-06 | End: 2023-06-09 | Stop reason: HOSPADM

## 2023-06-06 RX ORDER — PROCHLORPERAZINE MALEATE 10 MG
10 TABLET ORAL EVERY 6 HOURS PRN
Status: DISCONTINUED | OUTPATIENT
Start: 2023-06-06 | End: 2023-06-09 | Stop reason: HOSPADM

## 2023-06-06 RX ADMIN — Medication 10 ML: at 20:46

## 2023-06-06 RX ADMIN — TRAMADOL HYDROCHLORIDE 50 MG: 50 TABLET, COATED ORAL at 20:50

## 2023-06-06 RX ADMIN — SENNOSIDES AND DOCUSATE SODIUM 2 TABLET: 50; 8.6 TABLET ORAL at 20:42

## 2023-06-06 RX ADMIN — ENOXAPARIN SODIUM 30 MG: 30 INJECTION SUBCUTANEOUS at 22:27

## 2023-06-06 RX ADMIN — MIRTAZAPINE 15 MG: 15 TABLET, FILM COATED ORAL at 20:42

## 2023-06-06 RX ADMIN — SODIUM CHLORIDE 50 ML/HR: 9 INJECTION, SOLUTION INTRAVENOUS at 20:42

## 2023-06-06 NOTE — PLAN OF CARE
Goal Outcome Evaluation:  Plan of Care Reviewed With: patient, family           Outcome Evaluation: Patient admitted to room 305. Family at bedside.

## 2023-06-06 NOTE — ED PROVIDER NOTES
Subjective  History of Present Illness:    Patient is an 82-year-old female with history of hypertension, asthma, recently diagnosed pancreatic cancer who presents today after a fall.  Patient states mechanical fall that she slipped out of her bed and fell onto her right side.  Complaining of right knee, right wrist pain.  Patient denies hitting her head or loss of consciousness.  No preceding fevers, denies abdominal pain      Nurses Notes reviewed and agree, including vitals, allergies, social history and prior medical history.     REVIEW OF SYSTEMS: All systems reviewed and not pertinent unless noted.  Review of Systems   Constitutional:  Positive for activity change. Negative for appetite change, chills, fatigue and fever.   HENT:  Negative for rhinorrhea, sinus pressure and sinus pain.    Eyes:  Negative for discharge and itching.   Respiratory:  Negative for cough and shortness of breath.    Cardiovascular:  Negative for chest pain and leg swelling.   Gastrointestinal:  Negative for abdominal distention, abdominal pain, nausea and vomiting.   Endocrine: Negative for cold intolerance and heat intolerance.   Genitourinary:  Negative for decreased urine volume, difficulty urinating, flank pain, frequency, urgency, vaginal bleeding, vaginal discharge and vaginal pain.   Musculoskeletal:  Positive for arthralgias and joint swelling. Negative for back pain, gait problem, neck pain and neck stiffness.   Skin:  Negative for color change.   Allergic/Immunologic: Negative for environmental allergies.   Neurological:  Negative for seizures, syncope, facial asymmetry and speech difficulty.   Psychiatric/Behavioral:  Negative for self-injury and suicidal ideas.      Past Medical History:   Diagnosis Date    Arthritis     Asthma     Hypertension        Allergies:    Lactose intolerance (gi), Aspirin, and Procaine      Past Surgical History:   Procedure Laterality Date    APPENDECTOMY      BREAST SURGERY      cyst removed     "ERCP N/A 1/8/2023    Procedure: ENDOSCOPIC RETROGRADE CHOLANGIOPANCREATOGRAPHY WITH STENT;  Surgeon: Darius Obrien MD;  Location: [facility] ENDOSCOPY;  Service: Gastroenterology;  Laterality: N/A;  Sphincterotomy made. Common bile duct (CBD) bushed for non-gyne cyto. Wall stent 10x 60 placed in CBD.     SKIN CANCER EXCISION      TONSILLECTOMY      TUBAL ABDOMINAL LIGATION           Social History     Socioeconomic History    Marital status:    Tobacco Use    Smoking status: Never    Smokeless tobacco: Never   Vaping Use    Vaping Use: Never used   Substance and Sexual Activity    Alcohol use: Never    Drug use: Never    Sexual activity: Defer         Family History   Problem Relation Age of Onset    Kidney disease Mother     Asthma Mother     Kidney disease Father     Kidney disease Sister     Asthma Sister     Kidney disease Brother     Asthma Brother     Asthma Son        Objective  Physical Exam:  /62 (BP Location: Left arm, Patient Position: Lying)   Pulse 89   Temp 98 °F (36.7 °C) (Oral)   Resp 16   Ht 157.5 cm (62\")   Wt 53.4 kg (117 lb 11.6 oz)   SpO2 96%   BMI 21.53 kg/m²      Physical Exam  Constitutional:       General: She is not in acute distress.     Appearance: Normal appearance. She is not ill-appearing.   HENT:      Head: Normocephalic and atraumatic.      Nose: Nose normal. No congestion or rhinorrhea.      Mouth/Throat:      Mouth: Mucous membranes are dry.      Pharynx: Oropharynx is clear. No oropharyngeal exudate or posterior oropharyngeal erythema.   Eyes:      Extraocular Movements: Extraocular movements intact.      Conjunctiva/sclera: Conjunctivae normal.      Pupils: Pupils are equal, round, and reactive to light.   Cardiovascular:      Rate and Rhythm: Normal rate and regular rhythm.      Pulses: Normal pulses.      Heart sounds: Normal heart sounds.   Pulmonary:      Effort: Pulmonary effort is normal. No respiratory distress.      Breath sounds: Normal breath sounds. "   Abdominal:      General: Abdomen is flat. Bowel sounds are normal. There is no distension.      Palpations: Abdomen is soft.      Tenderness: There is no abdominal tenderness.   Musculoskeletal:         General: Swelling, tenderness and signs of injury present. No deformity. Normal range of motion.      Cervical back: Normal range of motion and neck supple.      Comments: Ecchymosis and swelling to the right wrist, right forearm, no obvious deformity noted, patient with pain to her right knee.   Skin:     General: Skin is warm and dry.      Capillary Refill: Capillary refill takes less than 2 seconds.   Neurological:      General: No focal deficit present.      Mental Status: She is alert and oriented to person, place, and time. Mental status is at baseline.      Cranial Nerves: No cranial nerve deficit.      Sensory: No sensory deficit.      Motor: No weakness.      Coordination: Coordination normal.   Psychiatric:         Mood and Affect: Mood normal.         Behavior: Behavior normal.         Thought Content: Thought content normal.         Judgment: Judgment normal.       Procedures    ED Course:         Lab Results (last 24 hours)       Procedure Component Value Units Date/Time    CBC & Differential [[id number]]  (Abnormal) Collected: 06/06/23 1622    Specimen: Blood Updated: 06/06/23 1629    Narrative:      The following orders were created for panel order CBC & Differential.  Procedure                               Abnormality         Status                     ---------                               -----------         ------                     CBC Auto Differential[[id number]]        Abnormal            Final result                 Please view results for these tests on the individual orders.    Comprehensive Metabolic Panel [[id number]]  (Abnormal) Collected: 06/06/23 1622    Specimen: Blood Updated: 06/06/23 0767     Glucose 123 mg/dL      BUN 13 mg/dL      Creatinine 0.59 mg/dL      Sodium 137 mmol/L       Potassium 3.4 mmol/L      Chloride 97 mmol/L      CO2 26.0 mmol/L      Calcium 9.2 mg/dL      Total Protein 7.6 g/dL      Albumin 3.6 g/dL      ALT (SGPT) 14 U/L      AST (SGOT) 20 U/L      Alkaline Phosphatase 349 U/L      Total Bilirubin 1.1 mg/dL      Globulin 4.0 gm/dL      A/G Ratio 0.9 g/dL      BUN/Creatinine Ratio 22.0     Anion Gap 14.0 mmol/L      eGFR 89.0 mL/min/1.73     Narrative:      GFR Normal >60  Chronic Kidney Disease <60  Kidney Failure <15    The GFR formula is only valid for adults with stable renal function between ages 18 and 70.    CBC Auto Differential [[id number]]  (Abnormal) Collected: 06/06/23 1622    Specimen: Blood Updated: 06/06/23 1629     WBC 11.18 10*3/mm3      RBC 4.10 10*6/mm3      Hemoglobin 11.3 g/dL      Hematocrit 34.1 %      MCV 83.2 fL      MCH 27.6 pg      MCHC 33.1 g/dL      RDW 17.5 %      RDW-SD 53.0 fl      MPV 9.9 fL      Platelets 208 10*3/mm3      Neutrophil % 84.9 %      Lymphocyte % 6.9 %      Monocyte % 6.7 %      Eosinophil % 0.6 %      Basophil % 0.5 %      Immature Grans % 0.4 %      Neutrophils, Absolute 9.48 10*3/mm3      Lymphocytes, Absolute 0.77 10*3/mm3      Monocytes, Absolute 0.75 10*3/mm3      Eosinophils, Absolute 0.07 10*3/mm3      Basophils, Absolute 0.06 10*3/mm3      Immature Grans, Absolute 0.05 10*3/mm3      nRBC 0.0 /100 WBC              XR Forearm 2 View Right    Result Date: 6/6/2023  CLINICAL INDICATION:  fall  EXAMINATION TECHNIQUE: XR FOREARM 2 VW RIGHT-  COMPARISON: None.  FINDINGS: No acute fracture or malalignment. No acute soft tissue abnormality. No radiodense foreign bodies. Osseous demineralization. Degenerative changes of the wrist joint and elbow joint.      Impression: No acute osseous findings.    Images personally reviewed, interpreted and dictated by GRISELDA Bettencourt.       This report was signed and finalized on 6/6/2023 1:46 PM by GRISELDA Bettencourt.    XR Wrist 3+ View Right    Result Date: 6/6/2023  CLINICAL  INDICATION:  fall  EXAMINATION TECHNIQUE: XR WRIST 3+ VW RIGHT-  COMPARISON: None.  FINDINGS: No acute fracture or malalignment. Moderate to severe degenerative changes of the radiocarpal, intercarpal, and carpometacarpal joints. Periarticular calcifications. Chondrocalcinosis. Osseous demineralization. Soft tissue swelling.      Impression: Degenerative changes without acute osseous findings. Soft tissue swelling.    Images personally reviewed, interpreted and dictated by GRISELDA Bettencourt.       This report was signed and finalized on 6/6/2023 1:45 PM by GRISELDA Bettencourt.    XR Knee 3 View Right    Result Date: 6/6/2023  CLINICAL INDICATION:  fall  EXAMINATION TECHNIQUE: XR KNEE 3 VW RIGHT-  COMPARISON: None.  FINDINGS: No acute fracture or malalignment. Moderate tricompartmental osteoarthrosis. Extensive chondrocalcinosis. Medial tilt of the joint line with a depressed medial tibial condyle, chronic. No suprapatellar joint effusion. No acute soft tissue abnormality. No radiodense foreign bodies.      Impression: No acute osseous findings. Advanced degenerative changes. Chondrocalcinosis.  Images personally reviewed, interpreted and dictated by GRISELDA Bettencourt.       This report was signed and finalized on 6/6/2023 1:52 PM by GRISELDA Bettencourt.    XR Foot 3+ View Right    Result Date: 6/6/2023  CLINICAL INDICATION:  trauma  EXAMINATION TECHNIQUE: XR FOOT 3+ VW RIGHT-  COMPARISON: None.  FINDINGS: Questionable nondisplaced fractures of the great toe proximal phalangeal distal shaft or artifact. Mild soft tissue swelling of the medial aspect of the forefoot. Mild degenerative changes of the first MTP joint. Osseous demineralization. Degenerative changes of the tibiotalar joint.      Impression: No definite acute fracture. Questionable nondisplaced fracture of the great toe proximal phalangeal distal shaft or artifact. Soft tissue swelling. Degenerative changes.  Images personally reviewed,  interpreted and dictated by GRISELDA Bettencourt.       This report was signed and finalized on 6/6/2023 1:49 PM by GRISELDA Bettencourt.    CT Head Without Contrast    Result Date: 6/6/2023  HEAD CT     6/6/2023 1:05 PM  HISTORY: Head trauma, minor (Age >= 65y)  TECHNIQUE: Multiple axial CT images were performed from the foramen magnum to the vertex. Coronal reformatted images were reconstructed from axial data set. This study was performed with techniques to keep radiation doses as low as reasonably achievable (ALARA). Individualized dose reduction techniques using automated exposure control or adjustment of mA and/or kV according to the patient size were employed. 843.84 mGy.cm  COMPARISON: None  FINDINGS: No acute intracranial hemorrhage or large acute cortical infarct. Chronic small vessel ischemic white matter changes and generalized cerebral volume loss are present. Ventricles are prominent. No midline shift. The basal cisterns are patent. Intracranial arterial atherosclerotic calcifications.  No skull fracture. Heterogeneously increased density of the bilateral occipital and posterior parietal bones The visualized paranasal sinuses and mastoid air cells are clear.      Impression: No acute intracranial hemorrhage or large acute cortical infarct. Chronic microvascular ischemic white matter changes with global volume loss and mild ventriculomegaly. Heterogeneous appearance of the posterior skull, concerning for sclerotic osseous metastasis. Correlate clinically.   CRITICAL RESULT: No.  COMMUNICATION: Per this written report.  Images personally reviewed, interpreted, and dictated by GRISELDA Bettencourt.          This report was signed and finalized on 6/6/2023 1:40 PM by GRISELDA Bettencourt.    CT Cervical Spine Without Contrast    Result Date: 6/6/2023  CT CERVICAL SPINE     6/6/2023 1:05 PM  HISTORY: Status post fall with neck pain.Neck trauma (Age >= 65y)  COMPARISON:  None.  PROCEDURE: Axial images  were obtained from the skull base to the thoracic inlet by computed tomography. 3 D reconstruction images were performed. This study was performed with techniques to keep radiation doses as low as reasonably achievable, (ALARA). Individualized dose reduction techniques using automated exposure control or adjustment of mA and/or kV according to the patient size were employed.   FINDINGS: There is no acute fracture or malalignment of the cervical spine. The facets are normally aligned. The cervical lordosis is maintained. Multilevel degenerative changes are present with disc space loss, endplate sclerosis, and marginal osteophytosis and associated mild to moderate uncovertebral and facet joint hypertrophy, and disc calcifications resulting in varying degree of mild to moderate spinal canal and neural foramina stenosis, predominantly involving C5-C6 and C6-C7. Moderate acute intra-axial joint degenerative changes.. No acute paraspinal abnormality. Limited images of the lung apices are unremarkable. There are multiple sclerotic lesions noted throughout the cervical spine and upper thoracic spine.      Impression: No acute fracture of the cervical spine. Moderate multilevel degenerative changes.  Multiple sclerotic lesions throughout the spine, compatible with blastic osseous metastasis, correlate clinically.     Images personally reviewed, interpreted and dictated by GRISELDA Bettencourt.  This report was signed and finalized on 6/6/2023 1:35 PM by GRISELDA Bettencourt.        MDM      Initial impression of presenting illness: Fall    DDX: includes but is not limited to: Wrist fracture, femur fracture, radius fracture intracranial hemorrhage, C-spine fracture    Patient arrives stable with vitals interpreted by myself.     Pertinent features from physical exam: Normal neuro exam, ecchymosis and swelling to the right wrist.    Initial diagnostic plan: CT head C-spine, x-ray wrist, x-ray forearm, x-ray right  knee    Results from initial plan were reviewed and interpreted by me revealing no concerns for traumatic injury on plain films    Diagnostic information from other sources: Yue with family member at bedside    Interventions / Re-evaluation: Observed in the emergency department for several hours with no change in vital signs.  On reassessment, patient was then able to ambulate given weakness, right arm pain.  Discussed negative trauma work-up on arm, patient continues to defer ambulation at this time.    Results/clinical rationale were discussed with patient and family at bedside    Consultations/Discussion of results with other physicians: Concerns for patient's inability to ambulate, discussed with hospitalist service and admitted for further observation    Disposition plan: admit  -----    Final diagnoses:   Weakness          Sd Chaudhry MD  06/06/23 9830

## 2023-06-06 NOTE — PHARMACY RECOMMENDATION
"Pharmacy Consult - Enoxaparin Dosing  Nicole Garcia is a 82 y.o. female who has been consulted to dose enoxaparin for VTE prophylaxis.     Allergies  Lactose intolerance (gi), Aspirin, and Procaine    Relevant clinical data and objective history reviewed:   [Ht: 157.5 cm (62\"); Wt: 53.4 kg (117 lb 11.6 oz)]  Body mass index is 21.53 kg/m².  Estimated Creatinine Clearance: 59.8 mL/min (by C-G formula based on SCr of 0.59 mg/dL).  Results from last 7 days   Lab Units 06/06/23  1622   HEMOGLOBIN g/dL 11.3*   HEMATOCRIT % 34.1   PLATELETS 10*3/mm3 208   CREATININE mg/dL 0.59       Asessment/Plan  Initiate enoxaparin 30 mg subq q 24 hrs (low weight patient).  Pharmacy will monitor Ms. Garcia's renal function and clinical status and adjust the enoxaparin dose and/or frequency as needed.      Thank you for the opportunity to consult on this patient.    Colby Gonzales [facility], Pharm.D.  06/06/23  19:55 EDT    "

## 2023-06-07 PROBLEM — R53.81 DEBILITY: Status: ACTIVE | Noted: 2023-06-07

## 2023-06-07 LAB
ANION GAP SERPL CALCULATED.3IONS-SCNC: 11.3 MMOL/L (ref 5–15)
BUN SERPL-MCNC: 11 MG/DL (ref 8–23)
BUN/CREAT SERPL: 19.6 (ref 7–25)
CALCIUM SPEC-SCNC: 8.9 MG/DL (ref 8.6–10.5)
CHLORIDE SERPL-SCNC: 98 MMOL/L (ref 98–107)
CO2 SERPL-SCNC: 22.7 MMOL/L (ref 22–29)
CREAT SERPL-MCNC: 0.56 MG/DL (ref 0.57–1)
DEPRECATED RDW RBC AUTO: 52 FL (ref 37–54)
EGFRCR SERPLBLD CKD-EPI 2021: 90.1 ML/MIN/1.73
ERYTHROCYTE [DISTWIDTH] IN BLOOD BY AUTOMATED COUNT: 17.2 % (ref 12.3–15.4)
GLUCOSE SERPL-MCNC: 123 MG/DL (ref 65–99)
HCT VFR BLD AUTO: 30.2 % (ref 34–46.6)
HGB BLD-MCNC: 10.1 G/DL (ref 12–15.9)
MAGNESIUM SERPL-MCNC: 1.3 MG/DL (ref 1.6–2.4)
MCH RBC QN AUTO: 27.5 PG (ref 26.6–33)
MCHC RBC AUTO-ENTMCNC: 33.4 G/DL (ref 31.5–35.7)
MCV RBC AUTO: 82.3 FL (ref 79–97)
PLATELET # BLD AUTO: 189 10*3/MM3 (ref 140–450)
PMV BLD AUTO: 10.2 FL (ref 6–12)
POTASSIUM SERPL-SCNC: 3.9 MMOL/L (ref 3.5–5.2)
RBC # BLD AUTO: 3.67 10*6/MM3 (ref 3.77–5.28)
SODIUM SERPL-SCNC: 132 MMOL/L (ref 136–145)
WBC NRBC COR # BLD: 8.9 10*3/MM3 (ref 3.4–10.8)

## 2023-06-07 PROCEDURE — G0378 HOSPITAL OBSERVATION PER HR: HCPCS

## 2023-06-07 PROCEDURE — 80048 BASIC METABOLIC PNL TOTAL CA: CPT | Performed by: FAMILY MEDICINE

## 2023-06-07 PROCEDURE — 96372 THER/PROPH/DIAG INJ SC/IM: CPT

## 2023-06-07 PROCEDURE — 83735 ASSAY OF MAGNESIUM: CPT | Performed by: FAMILY MEDICINE

## 2023-06-07 PROCEDURE — 25010000002 ENOXAPARIN PER 10 MG: Performed by: FAMILY MEDICINE

## 2023-06-07 PROCEDURE — 85027 COMPLETE CBC AUTOMATED: CPT | Performed by: FAMILY MEDICINE

## 2023-06-07 PROCEDURE — 97161 PT EVAL LOW COMPLEX 20 MIN: CPT

## 2023-06-07 PROCEDURE — 63710000001 PREDNISONE PER 1 MG: Performed by: FAMILY MEDICINE

## 2023-06-07 PROCEDURE — 97166 OT EVAL MOD COMPLEX 45 MIN: CPT

## 2023-06-07 PROCEDURE — 99232 SBSQ HOSP IP/OBS MODERATE 35: CPT | Performed by: FAMILY MEDICINE

## 2023-06-07 RX ORDER — PREDNISONE 20 MG/1
40 TABLET ORAL DAILY
Status: COMPLETED | OUTPATIENT
Start: 2023-06-07 | End: 2023-06-09

## 2023-06-07 RX ORDER — POTASSIUM CHLORIDE 20 MEQ/1
40 TABLET, EXTENDED RELEASE ORAL EVERY 4 HOURS
Status: COMPLETED | OUTPATIENT
Start: 2023-06-07 | End: 2023-06-07

## 2023-06-07 RX ORDER — PREDNISONE 20 MG/1
20 TABLET ORAL DAILY
Status: DISCONTINUED | OUTPATIENT
Start: 2023-06-10 | End: 2023-06-09 | Stop reason: HOSPADM

## 2023-06-07 RX ORDER — OXYCODONE HYDROCHLORIDE AND ACETAMINOPHEN 5; 325 MG/1; MG/1
0.5 TABLET ORAL EVERY 6 HOURS PRN
Status: DISCONTINUED | OUTPATIENT
Start: 2023-06-07 | End: 2023-06-09 | Stop reason: HOSPADM

## 2023-06-07 RX ORDER — PREDNISONE 10 MG/1
10 TABLET ORAL DAILY
Status: DISCONTINUED | OUTPATIENT
Start: 2023-06-13 | End: 2023-06-09 | Stop reason: HOSPADM

## 2023-06-07 RX ADMIN — MIRTAZAPINE 15 MG: 15 TABLET, FILM COATED ORAL at 21:19

## 2023-06-07 RX ADMIN — Medication 10 ML: at 21:19

## 2023-06-07 RX ADMIN — PREDNISONE 40 MG: 20 TABLET ORAL at 08:56

## 2023-06-07 RX ADMIN — POTASSIUM CHLORIDE 40 MEQ: 1500 TABLET, EXTENDED RELEASE ORAL at 01:04

## 2023-06-07 RX ADMIN — PANTOPRAZOLE SODIUM 40 MG: 40 TABLET, DELAYED RELEASE ORAL at 06:10

## 2023-06-07 RX ADMIN — POTASSIUM CHLORIDE 40 MEQ: 1500 TABLET, EXTENDED RELEASE ORAL at 15:58

## 2023-06-07 RX ADMIN — POTASSIUM CHLORIDE 40 MEQ: 1500 TABLET, EXTENDED RELEASE ORAL at 18:19

## 2023-06-07 RX ADMIN — ENOXAPARIN SODIUM 30 MG: 30 INJECTION SUBCUTANEOUS at 08:56

## 2023-06-07 RX ADMIN — POTASSIUM CHLORIDE 40 MEQ: 1500 TABLET, EXTENDED RELEASE ORAL at 04:08

## 2023-06-07 NOTE — PLAN OF CARE
Goal Outcome Evaluation:  Plan of Care Reviewed With: patient        Progress: no change  Outcome Evaluation: Pt seen for OT evaluation today.  Pt presents with weakness and decreased independence with self care and functional mobility tasks.  Pt is min to mod assist for ADL tasks d/t decreased endurance and pain in her R wrist/hand.  Pt noted with swelling and redness R wrist area.  Pt able to sit eob with min assist, stood with min assist and walked 12' with min assist and HHA/gait belt.  Pt is expected to benefit from skilled OT to improve her strength and independence with ADL tasks.

## 2023-06-07 NOTE — H&P
[facility]   HISTORY AND PHYSICAL      Name:  Nicole Garcia   Age:  82 y.o.  Sex:  female  :  1940  MRN:  [id number]   Visit Number:  [id number]  Admission Date:  2023  Date Of Service:  23  Primary Care Physician:  Zaida Marrero APRN    Chief Complaint:     Generalized weakness, joints pain, status post falls    History Of Presenting Illness:      Patient is an 82 years old female with a past medical history of recent diagnosis of pancreatic cancer which has been followed up at [facility] center, (status post ERCP, biliary stents), history of gout, asthma, hypertension who presented to the ER with a chief complaint generalized weakness and frequent falls.  Patient had a fall around 3 weeks ago when she slipped out of her bed and fell onto her right side.  She has been having right knee, right wrist and right big toe pain, erythema and swelling that has became worse and made her have another fall yesterday. Patient denies any other injury or trauma.  She denies hitting her head or passing out or losing consciousness. No recent fever, chest pain, abdominal pain, vomiting, diarrhea or urinary symptoms.  Family at bedside including her son and daughter-in-law.  Patient was recently discharged from [facility] on  where she was treated for UTI and bacteremia, she has finished antibiotics treatment prior to discharge.    On ER evaluation, Her vitals were stable and was afebrile.  Her labs were significant for potassium of 3.4, alk phos 349, WBC of 11.1.  CT head showed no acute intracranial hemorrhage or large acute cortical infarct.  Chronic microvascular ischemic white matter changes with global volume loss and mild ventriculomegaly. Heterogeneous appearance of the posterior skull, concerning for sclerotic osseous metastasis. Correlate clinically.  CT C-spine showed No acute fracture of the cervical spine. Moderate multilevel degenerative changes. Multiple  sclerotic lesions throughout the spine, compatible with blastic osseous metastasis, correlate clinically.  X-rays of right wrist, right forearm with no acute fractures and showed degenerative changes and soft tissue swelling.  X-ray of right knee No acute osseous findings. Advanced degenerative changes. Chondrocalcinosis.  X-ray of right foot showed no definite acute fracture. Questionable nondisplaced fracture of the great toe proximal phalangeal distal shaft or artifact. Soft tissue  swelling. Degenerative changes.  Patient received KCl 40 mEq while in the ER, after she failed to be able to ambulate and was still continuously generally weak, hospitalist consulted for admission for physical therapy and possibly placement.    Review Of Systems:    All systems were reviewed and negative except as mentioned in history of presenting illness, assessment and plan.    Past Medical History: Patient  has a past medical history of Arthritis, Asthma, and Hypertension.    Past Surgical History: Patient  has a past surgical history that includes Appendectomy; Tonsillectomy; Tubal ligation; Breast surgery; Skin cancer excision; and ERCP (N/A, 1/8/2023).    Social History: Patient  reports that she has never smoked. She has never used smokeless tobacco. She reports that she does not drink alcohol and does not use drugs.    Family History:  Patient's family history has been reviewed and found to be noncontributory.     Allergies:      Lactose intolerance (gi), Aspirin, and Procaine    Home Medications:    Prior to Admission Medications       Prescriptions Last Dose Informant Patient Reported? Taking?    lactase (Lactaid) 3000 units tablet 6/5/2023  No Yes    Take 1 tablet by mouth 3 (Three) Times a Day With Meals.    Lifitegrast (Xiidra) 5 % ophthalmic solution Past Week  Yes Yes    Xiidra 5 % eye drops in a dropperette    omeprazole (priLOSEC) 20 MG capsule 6/5/2023  Yes Yes    Take 1 capsule by mouth Daily.    ondansetron  (Zofran) 4 MG tablet 6/5/2023  No Yes    Take 1 tablet by mouth Every 8 (Eight) Hours As Needed for Nausea or Vomiting.    traMADol (ULTRAM) 50 MG tablet 6/5/2023  No Yes    Take 1 tablet by mouth Every 8 (Eight) Hours As Needed for Moderate Pain.    triamterene-hydrochlorothiazide (MAXZIDE-25) 37.5-25 MG per tablet 6/5/2023  No Yes    TAKE ONE TABLET BY MOUTH EVERY DAY    Patient taking differently:  Take 1 tablet by mouth Daily.    baclofen (LIORESAL) 10 MG tablet Not Taking  No No    Take 1 tablet by mouth 2 (Two) Times a Day As Needed for Muscle Spasms.    Patient not taking:  Reported on 6/6/2023    Calcium Carb-Cholecalciferol (Oyster Shell Calcium w/D) 500-5 MG-MCG tablet Not Taking  Yes No    Take 1 tablet by mouth Daily.    Patient not taking:  Reported on 6/6/2023    Calcium Carbonate-Vitamin D (Oyster Shell Calcium/D) 500-5 MG-MCG tablet   Yes No    Oyster Shell Calcium-Vitamin D3 500 mg-5 mcg (200 unit) tablet   TAKE ONE TABLET BY MOUTH EVERY DAY    Diclofenac Sodium (VOLTAREN) 1 % gel gel Not Taking  No No    Apply 4 g topically to the appropriate area as directed 4 (Four) Times a Day As Needed (as needed pain).    Patient not taking:  Reported on 6/6/2023    methylPREDNISolone (MEDROL) 4 MG dose pack Not Taking  No No    Take as directed on package instructions.    Patient not taking:  Reported on 6/6/2023    mirtazapine (REMERON) 15 MG tablet  Pharmacy Yes No    Take 1 tablet by mouth Every Night.    oxyCODONE-acetaminophen (PERCOCET) 5-325 MG per tablet  Pharmacy Yes No    Take 0.5 tablets by mouth Every 6 (Six) Hours As Needed.    prochlorperazine (COMPAZINE) 10 MG tablet  Pharmacy Yes No    Take 1 tablet by mouth Every 6 (Six) Hours As Needed for Nausea or Vomiting.          ED Medications:    Medications   sodium chloride 0.9 % flush 10 mL (has no administration in time range)   Diclofenac Sodium (VOLTAREN) 1 % gel 4 g (has no administration in time range)   pantoprazole (PROTONIX) EC tablet 40 mg  (has no administration in time range)   traMADol (ULTRAM) tablet 50 mg (has no administration in time range)   prochlorperazine (COMPAZINE) tablet 10 mg (has no administration in time range)   sodium chloride 0.9 % flush 10 mL (has no administration in time range)   sodium chloride 0.9 % flush 10 mL (has no administration in time range)   sodium chloride 0.9 % infusion 40 mL (has no administration in time range)   acetaminophen (TYLENOL) tablet 650 mg (has no administration in time range)     Or   acetaminophen (TYLENOL) 160 MG/5ML solution 650 mg (has no administration in time range)     Or   acetaminophen (TYLENOL) suppository 650 mg (has no administration in time range)   sennosides-docusate (PERICOLACE) 8.6-50 MG per tablet 2 tablet (has no administration in time range)     And   polyethylene glycol (MIRALAX) packet 17 g (has no administration in time range)     And   bisacodyl (DULCOLAX) EC tablet 5 mg (has no administration in time range)     And   bisacodyl (DULCOLAX) suppository 10 mg (has no administration in time range)   ondansetron (ZOFRAN) injection 4 mg (has no administration in time range)   Pharmacy to Dose enoxaparin (LOVENOX) (has no administration in time range)   Potassium Replacement - Follow Nurse / BPA Driven Protocol (has no administration in time range)   Magnesium Standard Dose Replacement - Follow Nurse / BPA Driven Protocol (has no administration in time range)   melatonin tablet 5 mg (has no administration in time range)   HYDROcodone-acetaminophen (NORCO) 5-325 MG per tablet 1 tablet (has no administration in time range)   mirtazapine (REMERON) tablet 15 mg (has no administration in time range)   sodium chloride 0.9 % infusion (has no administration in time range)   Enoxaparin Sodium (LOVENOX) syringe 30 mg (has no administration in time range)     Vital Signs:  Temp:  [98 °F (36.7 °C)-98.1 °F (36.7 °C)] 98 °F (36.7 °C)  Heart Rate:  [75-89] 89  Resp:  [16-18] 16  BP: (122-131)/(62-94)  "131/62        06/06/23  1240 06/06/23  1801   Weight: 58.5 kg (129 lb) 53.4 kg (117 lb 11.6 oz)     Body mass index is 21.53 kg/m².    Physical Exam:     Most recent vital Signs: /62 (BP Location: Left arm, Patient Position: Lying)   Pulse 89   Temp 98 °F (36.7 °C) (Oral)   Resp 16   Ht 157.5 cm (62\")   Wt 53.4 kg (117 lb 11.6 oz)   SpO2 96%   BMI 21.53 kg/m²     Physical Exam  Vitals and nursing note reviewed.   Constitutional:       General: She is not in acute distress.     Comments: Generalized weakness noted.   HENT:      Head: Normocephalic and atraumatic.      Right Ear: External ear normal.      Left Ear: External ear normal.      Nose: Nose normal.      Mouth/Throat:      Mouth: Mucous membranes are dry.   Eyes:      Extraocular Movements: Extraocular movements intact.      Conjunctiva/sclera: Conjunctivae normal.      Pupils: Pupils are equal, round, and reactive to light.   Cardiovascular:      Rate and Rhythm: Normal rate and regular rhythm.      Pulses: Normal pulses.      Heart sounds: Normal heart sounds.   Pulmonary:      Effort: Pulmonary effort is normal. No respiratory distress.      Breath sounds: Normal breath sounds. No wheezing or rhonchi.   Abdominal:      General: Bowel sounds are normal. There is no distension.      Palpations: Abdomen is soft.      Tenderness: There is no abdominal tenderness.   Musculoskeletal:         General: Normal range of motion.        Arms:       Cervical back: Normal range of motion and neck supple.      Right lower leg: Tenderness present. No edema.      Left lower leg: No edema.        Legs:       Comments: - RUE: Right lateral breast redness and swelling, tenderness to palpation and movement.  -RLE: Right anterior and medial knee redness and swelling, tender to palpation and with most knee joint range of motions.  -R Foot: Redness, erythema, increased warmth over the first metatarsophalangeal joint with tenderness to palpation and movement.   "   Skin:     General: Skin is warm and dry.      Findings: No rash.   Neurological:      General: No focal deficit present.      Mental Status: She is alert and oriented to person, place, and time. Mental status is at baseline.      Motor: No weakness.   Psychiatric:         Mood and Affect: Mood normal.         Behavior: Behavior normal.         Thought Content: Thought content normal.       Laboratory data:    I have reviewed the labs done in the emergency room.    Results from last 7 days   Lab Units 06/06/23  1622   SODIUM mmol/L 137   POTASSIUM mmol/L 3.4*   CHLORIDE mmol/L 97*   CO2 mmol/L 26.0   BUN mg/dL 13   CREATININE mg/dL 0.59   CALCIUM mg/dL 9.2   BILIRUBIN mg/dL 1.1   ALK PHOS U/L 349*   ALT (SGPT) U/L 14   AST (SGOT) U/L 20   GLUCOSE mg/dL 123*     Results from last 7 days   Lab Units 06/06/23  1622   WBC 10*3/mm3 11.18*   HEMOGLOBIN g/dL 11.3*   HEMATOCRIT % 34.1   PLATELETS 10*3/mm3 208                                   Invalid input(s): USDES,  BLOODU, NITRITITE, BACT, EP    Pain Management Panel           No data to display                Radiology:    XR Forearm 2 View Right    Result Date: 6/6/2023  CLINICAL INDICATION:  fall  EXAMINATION TECHNIQUE: XR FOREARM 2 VW RIGHT-  COMPARISON: None.  FINDINGS: No acute fracture or malalignment. No acute soft tissue abnormality. No radiodense foreign bodies. Osseous demineralization. Degenerative changes of the wrist joint and elbow joint.      No acute osseous findings.    Images personally reviewed, interpreted and dictated by GRISELDA Bettencourt.       This report was signed and finalized on 6/6/2023 1:46 PM by GRISELDA Bettencourt.    XR Wrist 3+ View Right    Result Date: 6/6/2023  CLINICAL INDICATION:  fall  EXAMINATION TECHNIQUE: XR WRIST 3+ VW RIGHT-  COMPARISON: None.  FINDINGS: No acute fracture or malalignment. Moderate to severe degenerative changes of the radiocarpal, intercarpal, and carpometacarpal joints. Periarticular calcifications.  Chondrocalcinosis. Osseous demineralization. Soft tissue swelling.      Degenerative changes without acute osseous findings. Soft tissue swelling.    Images personally reviewed, interpreted and dictated by GRISELDA Bettencourt.       This report was signed and finalized on 6/6/2023 1:45 PM by GRISELDA Bettencourt.    XR Knee 3 View Right    Result Date: 6/6/2023  CLINICAL INDICATION:  fall  EXAMINATION TECHNIQUE: XR KNEE 3 VW RIGHT-  COMPARISON: None.  FINDINGS: No acute fracture or malalignment. Moderate tricompartmental osteoarthrosis. Extensive chondrocalcinosis. Medial tilt of the joint line with a depressed medial tibial condyle, chronic. No suprapatellar joint effusion. No acute soft tissue abnormality. No radiodense foreign bodies.      No acute osseous findings. Advanced degenerative changes. Chondrocalcinosis.  Images personally reviewed, interpreted and dictated by GRISELDA Bettencourt.       This report was signed and finalized on 6/6/2023 1:52 PM by GRISELDA Bettencourt.    XR Foot 3+ View Right    Result Date: 6/6/2023  CLINICAL INDICATION:  trauma  EXAMINATION TECHNIQUE: XR FOOT 3+ VW RIGHT-  COMPARISON: None.  FINDINGS: Questionable nondisplaced fractures of the great toe proximal phalangeal distal shaft or artifact. Mild soft tissue swelling of the medial aspect of the forefoot. Mild degenerative changes of the first MTP joint. Osseous demineralization. Degenerative changes of the tibiotalar joint.      No definite acute fracture. Questionable nondisplaced fracture of the great toe proximal phalangeal distal shaft or artifact. Soft tissue swelling. Degenerative changes.  Images personally reviewed, interpreted and dictated by GRISELDA Bettencourt.       This report was signed and finalized on 6/6/2023 1:49 PM by GRISELDA Bettencourt.    CT Head Without Contrast    Result Date: 6/6/2023  HEAD CT     6/6/2023 1:05 PM  HISTORY: Head trauma, minor (Age >= 65y)  TECHNIQUE: Multiple axial CT images  were performed from the foramen magnum to the vertex. Coronal reformatted images were reconstructed from axial data set. This study was performed with techniques to keep radiation doses as low as reasonably achievable (ALARA). Individualized dose reduction techniques using automated exposure control or adjustment of mA and/or kV according to the patient size were employed. 843.84 mGy.cm  COMPARISON: None  FINDINGS: No acute intracranial hemorrhage or large acute cortical infarct. Chronic small vessel ischemic white matter changes and generalized cerebral volume loss are present. Ventricles are prominent. No midline shift. The basal cisterns are patent. Intracranial arterial atherosclerotic calcifications.  No skull fracture. Heterogeneously increased density of the bilateral occipital and posterior parietal bones The visualized paranasal sinuses and mastoid air cells are clear.      No acute intracranial hemorrhage or large acute cortical infarct. Chronic microvascular ischemic white matter changes with global volume loss and mild ventriculomegaly. Heterogeneous appearance of the posterior skull, concerning for sclerotic osseous metastasis. Correlate clinically.   CRITICAL RESULT: No.  COMMUNICATION: Per this written report.  Images personally reviewed, interpreted, and dictated by GRISELDA Bettencourt.          This report was signed and finalized on 6/6/2023 1:40 PM by GRISELDA Bettencourt.    CT Cervical Spine Without Contrast    Result Date: 6/6/2023  CT CERVICAL SPINE     6/6/2023 1:05 PM  HISTORY: Status post fall with neck pain.Neck trauma (Age >= 65y)  COMPARISON:  None.  PROCEDURE: Axial images were obtained from the skull base to the thoracic inlet by computed tomography. 3 D reconstruction images were performed. This study was performed with techniques to keep radiation doses as low as reasonably achievable, (ALARA). Individualized dose reduction techniques using automated exposure control or adjustment  of mA and/or kV according to the patient size were employed.   FINDINGS: There is no acute fracture or malalignment of the cervical spine. The facets are normally aligned. The cervical lordosis is maintained. Multilevel degenerative changes are present with disc space loss, endplate sclerosis, and marginal osteophytosis and associated mild to moderate uncovertebral and facet joint hypertrophy, and disc calcifications resulting in varying degree of mild to moderate spinal canal and neural foramina stenosis, predominantly involving C5-C6 and C6-C7. Moderate acute intra-axial joint degenerative changes.. No acute paraspinal abnormality. Limited images of the lung apices are unremarkable. There are multiple sclerotic lesions noted throughout the cervical spine and upper thoracic spine.      No acute fracture of the cervical spine. Moderate multilevel degenerative changes.  Multiple sclerotic lesions throughout the spine, compatible with blastic osseous metastasis, correlate clinically.     Images personally reviewed, interpreted and dictated by GRISELDA Bettencourt.  This report was signed and finalized on 6/6/2023 1:35 PM by GRISELDA Bettencourt.     Assessment:    Generalized weakness, POA  Possible acute gout, POA  Hypokalemia, POA  Frequent falls, prior to arrival  Recent diagnosis of pancreatic cancer  S/p ERCP and biliary stents  Asthma without exacerbation  Hypertension  Debility    Plan:    Patient is admitted for further management and treatment.    Gout arthritis  -Patient likely with acute gout arthritis in multiple joints, she has a history of gout in the past, not currently taking medicines for gout.   -Will start patient on p.o. prednisone taper as steroids have helped in the past with her gout attacks.    -Pain control as needed with [place] as patient has not been able to tolerate oxycodone at home.    Hypokalemia  -Replace per protocol.    Pancreatic cancer  Known sclerotic lesions throughout thoracic  spine   -Following up with [facility].  - CT head showed Heterogeneous appearance of the posterior skull, concerning for sclerotic osseous metastasis. CT C-spine showed multiple sclerotic lesions throughout the spine, compatible with blastic osseous metastasis.    -We will update the patient and family on those CT findings in a.m. and make sure they know about them.   -Patient and family were considering palliative treatment.    Generalized weakness/debility  Frequent falls  -Multifactorial including advanced age, pancreatic cancer, multiple joints pain/gout.  -Will check urinalysis  -PT/OT consulted.  -Case management consulted, patient will need placement, she lives alone and unable to take care of herself.    -Continue home meds as warranted.  -Further orders as indicated per clinical course.  -Patient declines resuscitation, CPR or intubation.  Patient with full decision-making capacity.  Patient understands about her wishes and was able to verbalize that back. CODE STATUS was ordered as DNR/DNI per her wishes.    -Discussed with the patient and her family at bedside about management and plan, all questions were answered to their satisfaction..    Risk Assessment: Moderate  DVT Prophylaxis: Lovenox prophylaxis (benefit> risk)  Code Status: DNR/DNI  Diet: Cardiac    Advance Care Planning   ACP discussion was held with the patient during this visit. Patient does not have an advance directive, information provided.       Niki Cheo MD  06/06/23  20:06 EDT    Dictated utilizing Dragon dictation.

## 2023-06-07 NOTE — PLAN OF CARE
Goal Outcome Evaluation:  Plan of Care Reviewed With: patient        Progress: no change  Outcome Evaluation: Pt participated in PT eval this date. Pt received in supine. Reports R LE and UE pain, R wrist swollen and erythematous. Pt transferred to EOB with Rios, very little use from R UE due to pain. Pt stood with Rios and ambulated with HHA 12 ft around the bed to the chair. pt is expected to benefit from skilled PTx during this inpatient stay to address deficits in strength, gait and mobilty.

## 2023-06-07 NOTE — PLAN OF CARE
"Goal Outcome Evaluation:      Palliative Care Team Consult received for Goals of Care Discussion/ACP, Hospice Referral /Discussion    Pt's CODE STATUS was changed 6/06/2023 to No CPR, Limited Interventions--No Intubation---DNR/DNI    Pt does not have an Advance Directive in the EMR but was informed pt has and  AD and was requested to have someone bring it in per Med/Surg nurse.    Pt Admitted via ED S/P fall at home--reported \"slipped out of bed and fell on to her right side\" approx 3 weeks ago.   She reported right knee, wrist and right big toe pain.  Erythema  and swelling reported.   Reports generalized weakness.  CT of head revealed \"Heterogeneous appearance of posterior skull, concerning for sclerotic osseous metastasis\". Pt denied hitting her head or LOC.    Pt has a recent diagnosis of Pancreatic Cancer ([facility])  CT spine revealed \"multiple sclerotic lesions throughout the spine, compatible with blastic osseous metastasis\".   ALK PHOS 349.      Admission Assessment:  Generalized Weakness, POA, Possible acute gout, POA, Hypkalemia, POA, Frequent falls PTA, Recent diagnosis of pancreatic cancer, s/p ERCP and biliary stents, Asthma without exacerbation, HTN, Debility    PMHx:  Recent Dx Pancreatic Cancer, s/p ERCP with stens, Gout, Asthma, HTN.    Spoke with pt's nurse, Clau LUA,  prior to visit.  She reported pt awake, alert and able to answer questions.      Visited pt to find her awake and alert.  I explained I was with the Palliative Care team.  She immediately reported wanting to hear from us.   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  She added the best part of being a teacher is \"the teaching\".      Pt reported her nurse with [facility] recommended \"Palliative Care\".  I explained we have two programs under PC--Hospice and PC for chronic illnesses which I explained is more of a support " "and/or transition program for pt's not appropriate for Hospice or not ready for Hospice.  I added some people refer to it as a \"pre\" Hospice.      We discussed the services each program provided.  I confirmed with pt her plan to go to short term rehab.  I then explained should PC recommend a transition to Hospice services, she would need to transition to LTC.  Pt responded she did not plan on going to LTC.  She added she did not want to and was not sure she would \"live long enough\" to go there.  I explained that Medicare would not pay for short term rehab and Hospice at the same time.  She verbalized understanding.    I mentioned I was informed she was not going to get chemotherapy.  She reported after hearing news this morning, she was informed chemo was not recommended.  She related her late  had undergone chemotherapy and lived 5 yrs.--but it was \"horrible\".  She stated I will not be getting chemotherapy.    Pt was very open re: limited life span with her \"cancer\".  She related she \"was ready to go\".   I assured her the PC team will as much as we can and will always listed to her wishes.       She inquired about PC information available --which I gave her a pamphlet.  I asked if she would like a Hospice pamphlet which initially she declined and then asked for one.    Informed her PC will continue to follow.                     "

## 2023-06-07 NOTE — CASE MANAGEMENT/SOCIAL WORK
Discharge Planning Assessment   [facility]     Patient Name: Nicole Garcia  MRN: [id number]  Today's Date: 6/7/2023    Admit Date: 6/6/2023    Plan: home with home health versus rehab   Discharge Needs Assessment    No documentation.                  Discharge Plan       Row Name 06/07/23 1332       Plan    Plan Comments Left message with Katherine                  Continued Care and Services - Admitted Since 6/6/2023       Destination       Service Provider Request Status Selected Services Address Phone Fax Patient Preferred    [facility] Pending - [facility] Sent N/A [id number] [facility] [id number] [phone] --                  Expected Discharge Date and Time       Expected Discharge Date Expected Discharge Time    Jun 9, 2023            Demographic Summary    No documentation.                  Functional Status    No documentation.                  Psychosocial    No documentation.                  Abuse/Neglect    No documentation.                  Legal    No documentation.                  Substance Abuse    No documentation.                  Patient Forms    No documentation.                     Sharon Jacob RN

## 2023-06-07 NOTE — PLAN OF CARE
Goal Outcome Evaluation:  Plan of Care Reviewed With: patient, family        Progress: improving  Outcome Evaluation: Pt VSS. Patient was able to get up with 1 assist with a walker to the bathroom today. By end of day shift Pt was able to lift her right arm, which she wasn't able to at the start of shift due to weakness and soreness.

## 2023-06-07 NOTE — CONSULTS
"Adult Nutrition  Assessment/PES    Patient Name:  Nicole Garcia  YOB: 1940  MRN: [id number]  Admit Date:  6/6/2023    Assessment Date:  6/7/2023    Comments:    Pt with PMHx significant for recent metastatic pancreatic cancer, HTN, gout presented to [facility] 6/6 with c/o weakness, frequent falls.  lbs, underweight for age per BMI 20.93. MST 2, unintentional weight loss indicated per nutrition screen. RD completed MSA, pt qualifies for moderate malnutrition. Currently receiving a cardiac diet with one PO intake of 100% this a.m. at breakfast. Pt does not like Boost, is agreeable to Magic Cup. Of note, pt is lactose intolerant but assures me she can tolerate ice cream. Will order QD. RD also provided brief education of high-calorie, high-protein diet and encouraged pt to incorporate these foods into her diet at home. Will F/U. Available PRN.      Reason for Assessment       Row Name 06/07/23 1438          Reason for Assessment    Reason For Assessment nurse/nurse practitioner consult;identified at risk by screening criteria     Identified At Risk by Screening Criteria MST SCORE 2+                      Labs/Tests/Procedures/Meds       Row Name 06/07/23 1438          Labs/Procedures/Meds    Lab Results Reviewed reviewed, pertinent     Lab Results Comments Low: Na+, Cr, Mg++; High: Glucose        Medications    Pertinent Medications Reviewed reviewed, pertinent     Pertinent Medications Comments lovenox, remeron, protonix, KCl, NaCl                    Physical Findings       Row Name 06/07/23 1438          Physical Findings    Overall Physical Appearance underweight for age                    Estimated/Assessed Needs - Anthropometrics       Row Name 06/07/23 1439          Anthropometrics    Height for Calculation 1.575 m (5' 2\")     Weight for Calculation 52 kg (114 lb 10.2 oz)  CBW        Estimated/Assessed Needs    Additional Documentation Fluid Requirements (Group);KCAL/KG (Group);Estimated Calorie " Needs (Group);Protein Requirements (Group)        Estimated Calorie Needs    Estimated Calorie Requirement (kcal/day) 1,560-1,820     Estimated Calorie Need Method kcal/kg        KCAL/KG    KCAL/KG 30 Kcal/Kg (kcal);35 Kcal/Kg (kcal)     30 Kcal/Kg (kcal) 1560     35 Kcal/Kg (kcal) 1820        Protein Requirements    Weight Used For Protein Calculations 52 kg (114 lb 10.2 oz)  CBW     Est Protein Requirement Amount (gms/kg) 1.2 gm protein     Estimated Protein Requirements (gms/day) 62.4        Fluid Requirements    Fluid Requirements (mL/day) 1560  1 mL/kcal     RDA Method (mL) 1560                    Nutrition Prescription Ordered       Row Name 06/07/23 1441          Nutrition Prescription PO    Current PO Diet Regular     Fluid Consistency Thin     Common Modifiers Cardiac                    Evaluation of Received Nutrient/Fluid Intake       Row Name 06/07/23 1441          PO Evaluation    Number of Days PO Intake Evaluated Insufficient Data                    Malnutrition Severity Assessment       Row Name 06/07/23 1441          Malnutrition Severity Assessment    Malnutrition Type Chronic Disease - Related Malnutrition        Insufficient Energy Intake     Insufficient Energy Intake Findings --  Unable to accurately assess        Unintentional Weight Loss     Unintentional Weight Loss Findings --  Weight loss 5 lb (4.2%) x 1 month is not considered to be significant        Muscle Loss    [place] Severe - deep hollowing/scooping, lack of muscle to touch, facial bones well defined     Clavicle Bone Region Moderate - some protrusion in females, visible in males     Acromion Bone Region Moderate - acromion may slightly protrude     Dorsal Hand Region Moderate - slight depression     Posterior Calf Region Moderate - some roundness, slight firmness        Fat Loss    Upper Arm Region Moderate - some fat tissue, not ample        Criteria Met (Must meet criteria for severity in at least 2 of these categories: M  Wasting, Fat Loss, Fluid, Secondary Signs, Wt. Status, Intake)    Patient meets criteria for  Moderate (non-severe) Malnutrition                     Problem/Interventions:   Problem 1       Row Name 06/07/23 1447          Nutrition Diagnoses Problem 1    Problem 1 Malnutrition     Etiology (related to) Factors Affecting Nutrition;Medical Diagnosis     Oncology Pancreatic cancer     Appetite Poor     Signs/Symptoms (evidenced by) Report/Observation  NFPE findings                          Intervention Goal       Row Name 06/07/23 1448          Intervention Goal    General Maintain nutrition;Meet nutritional needs for age/condition;Disease management/therapy;Improved nutrition related lab(s);Provide information regarding MNT for treatment/condition     PO Establish PO;Tolerate PO;Meet estimated needs     Weight Maintain weight                    Nutrition Intervention       Row Name 06/07/23 1448          Nutrition Intervention    RD/Tech Action Follow Tx progress;Encourage intake;Recommend/ordered;Care plan reviewd     Recommended/Ordered Supplement                    Nutrition Prescription       Row Name 06/07/23 1448          Nutrition Prescription PO    PO Prescription Begin/change supplement     Supplement Magic Cup     Supplement Frequency Daily     New PO Prescription Ordered? Yes        Other Orders    Obtain Weight Daily     Obtain Weight Ordered? No, recommended     Supplement Vitamin mineral supplement     Supplement Ordered? No, recommended     Labs K+;Phos;Mg++;Na+     Labs Ordered? No, recommended                    Education/Evaluation       Row Name 06/07/23 1509          Education    Education Education topics     Education Topics Protein        Monitor/Evaluation    Monitor Per protocol;PO intake;Weight;Skin status;Symptoms;Pertinent labs;Supplement intake                     Electronically signed by:  Mariel Gudino RD  06/07/23 15:10 EDT

## 2023-06-07 NOTE — PROGRESS NOTES
Malnutrition Severity Assessment    Patient Name:  Nicole Garcia  YOB: 1940  MRN: [id number]  Admit Date:  6/6/2023    Patient meets criteria for : Moderate (non-severe) Malnutrition    Comments:    RD met with pt 6/y to complete MSA. Pt explains that she was diagnosed with pancreatic cancer in January of this year, endorses poor appetite since November of 2022.  lbs per pt,  lbs. Per weight history, pt with 5 lb (4.2%) weight loss x 1 month. NFPE performed, evidence of moderate to severe muscle wasting/fat loss present upon exam. Pt meets criteria for moderate malnutrition. Pt does not like Boost, is agreeable to Magic Cup. Will order QD and will F/U. Available PRN.     Malnutrition Severity Assessment  Malnutrition Type: Chronic Disease - Related Malnutrition  Malnutrition Type (last 8 hours)       Malnutrition Severity Assessment       Row Name 06/07/23 1441       Malnutrition Severity Assessment    Malnutrition Type Chronic Disease - Related Malnutrition      Row Name 06/07/23 1441       Insufficient Energy Intake     Insufficient Energy Intake Findings --  Unable to accurately assess      Row Name 06/07/23 1441       Unintentional Weight Loss     Unintentional Weight Loss Findings --  Weight loss 5 lb (4.2%) x 1 month is not considered to be significant      Row Name 06/07/23 1441       Muscle Loss    [place] Region Severe - deep hollowing/scooping, lack of muscle to touch, facial bones well defined    Clavicle Bone Region Moderate - some protrusion in females, visible in males    Acromion Bone Region Moderate - acromion may slightly protrude    Dorsal Hand Region Moderate - slight depression    Posterior Calf Region Moderate - some roundness, slight firmness      Row Name 06/07/23 1441       Fat Loss    Upper Arm Region Moderate - some fat tissue, not ample      Row Name 06/07/23 1441       Criteria Met (Must meet criteria for severity in at least 2 of these categories: M Wasting,  Fat Loss, Fluid, Secondary Signs, Wt. Status, Intake)    Patient meets criteria for  Moderate (non-severe) Malnutrition                    Electronically signed by:  Mariel Gudino RD  06/07/23 15:17 EDT

## 2023-06-07 NOTE — DISCHARGE PLACEMENT REQUEST
"Nicole Singh (82 y.o. Female)       Date of Birth   1940    Social Security Number       Address   [place] [address]    Home Phone   [phone]    MRN   [id number]       Quaker   Non-Pentecostalism    Marital Status                               Admission Date   23    Admission Type   Emergency    Admitting Provider       Attending Provider   Christina Palmer DO    Department, Room/Bed   [facility] TELEMETRY 3, 305/1       Discharge Date       Discharge Disposition       Discharge Destination                                 Attending Provider: Christina Palmer DO    Allergies: Lactose Intolerance (Gi), Aspirin, Procaine    Isolation: None   Infection: None   Code Status: No CPR    Ht: 157.5 cm (62\")   Wt: 51.9 kg (114 lb 6.7 oz)    Admission Cmt: None   Principal Problem: Pancreas malignancy [C25.9]                   Active Insurance as of 2023       Primary Coverage       Payor Plan Insurance Group Employer/Plan Group    [facility] MEDICARE REPLACEMENT [facility] MEDICARE REPLACEMENT [id number]       Payor Plan Address Payor Plan Phone Number Payor Plan Fax Number Effective Dates    [address] [address]   2021 - None Entered    [facility] [id number]         Subscriber Name Subscriber Birth Date Member ID       NICOLE SINGH 1940 [id number]                     Emergency Contacts        (Rel.) Home Phone Work Phone Mobile Phone    Fernando Singh (Son) [phone] -- [phone]    JUANITA SINGH (Relative) [phone] -- --    JoseMaribel (Grandchild) -- -- [phone]                 History & Physical        Niki Choe MD at 23 2006            [facility] HOSPITALIST   HISTORY AND PHYSICAL      Name:  Nicole Singh   Age:  82 y.o.  Sex:  female  :  1940  MRN:  [id number]   Visit Number:  [id number]  Admission Date:  2023  Date Of Service:  23  Primary Care Physician: "  Zaida Marrero APRN    Chief Complaint:     Generalized weakness, joints pain, status post falls    History Of Presenting Illness:      Patient is an 82 years old female with a past medical history of recent diagnosis of pancreatic cancer which has been followed up at [facility] cancer center, (status post ERCP, biliary stents), history of gout, asthma, hypertension who presented to the ER with a chief complaint generalized weakness and frequent falls.  Patient had a fall around 3 weeks ago when she slipped out of her bed and fell onto her right side.  She has been having right knee, right wrist and right big toe pain, erythema and swelling that has became worse and made her have another fall yesterday. Patient denies any other injury or trauma.  She denies hitting her head or passing out or losing consciousness. No recent fever, chest pain, abdominal pain, vomiting, diarrhea or urinary symptoms.  Family at bedside including her son and daughter-in-law.  Patient was recently discharged from [facility] on 5/29 where she was treated for UTI and bacteremia, she has finished antibiotics treatment prior to discharge.    On ER evaluation, Her vitals were stable and was afebrile.  Her labs were significant for potassium of 3.4, alk phos 349, WBC of 11.1.  CT head showed no acute intracranial hemorrhage or large acute cortical infarct.  Chronic microvascular ischemic white matter changes with global volume loss and mild ventriculomegaly. Heterogeneous appearance of the posterior skull, concerning for sclerotic osseous metastasis. Correlate clinically.  CT C-spine showed No acute fracture of the cervical spine. Moderate multilevel degenerative changes. Multiple sclerotic lesions throughout the spine, compatible with blastic osseous metastasis, correlate clinically.  X-rays of right wrist, right forearm with no acute fractures and showed degenerative changes and soft tissue swelling.  X-ray of right knee No acute  osseous findings. Advanced degenerative changes. Chondrocalcinosis.  X-ray of right foot showed no definite acute fracture. Questionable nondisplaced fracture of the great toe proximal phalangeal distal shaft or artifact. Soft tissue  swelling. Degenerative changes.  Patient received KCl 40 mEq while in the ER, after she failed to be able to ambulate and was still continuously generally weak, hospitalist consulted for admission for physical therapy and possibly placement.    Review Of Systems:    All systems were reviewed and negative except as mentioned in history of presenting illness, assessment and plan.    Past Medical History: Patient  has a past medical history of Arthritis, Asthma, and Hypertension.    Past Surgical History: Patient  has a past surgical history that includes Appendectomy; Tonsillectomy; Tubal ligation; Breast surgery; Skin cancer excision; and ERCP (N/A, 1/8/2023).    Social History: Patient  reports that she has never smoked. She has never used smokeless tobacco. She reports that she does not drink alcohol and does not use drugs.    Family History:  Patient's family history has been reviewed and found to be noncontributory.     Allergies:      Lactose intolerance (gi), Aspirin, and Procaine    Home Medications:    Prior to Admission Medications       Prescriptions Last Dose Informant Patient Reported? Taking?    lactase (Lactaid) 3000 units tablet 6/5/2023  No Yes    Take 1 tablet by mouth 3 (Three) Times a Day With Meals.    Lifitegrast (Xiidra) 5 % ophthalmic solution Past Week  Yes Yes    Xiidra 5 % eye drops in a dropperette    omeprazole (priLOSEC) 20 MG capsule 6/5/2023  Yes Yes    Take 1 capsule by mouth Daily.    ondansetron (Zofran) 4 MG tablet 6/5/2023  No Yes    Take 1 tablet by mouth Every 8 (Eight) Hours As Needed for Nausea or Vomiting.    traMADol (ULTRAM) 50 MG tablet 6/5/2023  No Yes    Take 1 tablet by mouth Every 8 (Eight) Hours As Needed for Moderate Pain.     triamterene-hydrochlorothiazide (MAXZIDE-25) 37.5-25 MG per tablet 6/5/2023  No Yes    TAKE ONE TABLET BY MOUTH EVERY DAY    Patient taking differently:  Take 1 tablet by mouth Daily.    baclofen (LIORESAL) 10 MG tablet Not Taking  No No    Take 1 tablet by mouth 2 (Two) Times a Day As Needed for Muscle Spasms.    Patient not taking:  Reported on 6/6/2023    Calcium Carb-Cholecalciferol (Oyster Shell Calcium w/D) 500-5 MG-MCG tablet Not Taking  Yes No    Take 1 tablet by mouth Daily.    Patient not taking:  Reported on 6/6/2023    Calcium Carbonate-Vitamin D (Oyster Shell Calcium/D) 500-5 MG-MCG tablet   Yes No    Oyster Shell Calcium-Vitamin D3 500 mg-5 mcg (200 unit) tablet   TAKE ONE TABLET BY MOUTH EVERY DAY    Diclofenac Sodium (VOLTAREN) 1 % gel gel Not Taking  No No    Apply 4 g topically to the appropriate area as directed 4 (Four) Times a Day As Needed (as needed pain).    Patient not taking:  Reported on 6/6/2023    methylPREDNISolone (MEDROL) 4 MG dose pack Not Taking  No No    Take as directed on package instructions.    Patient not taking:  Reported on 6/6/2023    mirtazapine (REMERON) 15 MG tablet  Pharmacy Yes No    Take 1 tablet by mouth Every Night.    oxyCODONE-acetaminophen (PERCOCET) 5-325 MG per tablet  Pharmacy Yes No    Take 0.5 tablets by mouth Every 6 (Six) Hours As Needed.    prochlorperazine (COMPAZINE) 10 MG tablet  Pharmacy Yes No    Take 1 tablet by mouth Every 6 (Six) Hours As Needed for Nausea or Vomiting.          ED Medications:    Medications   sodium chloride 0.9 % flush 10 mL (has no administration in time range)   Diclofenac Sodium (VOLTAREN) 1 % gel 4 g (has no administration in time range)   pantoprazole (PROTONIX) EC tablet 40 mg (has no administration in time range)   traMADol (ULTRAM) tablet 50 mg (has no administration in time range)   prochlorperazine (COMPAZINE) tablet 10 mg (has no administration in time range)   sodium chloride 0.9 % flush 10 mL (has no administration  in time range)   sodium chloride 0.9 % flush 10 mL (has no administration in time range)   sodium chloride 0.9 % infusion 40 mL (has no administration in time range)   acetaminophen (TYLENOL) tablet 650 mg (has no administration in time range)     Or   acetaminophen (TYLENOL) 160 MG/5ML solution 650 mg (has no administration in time range)     Or   acetaminophen (TYLENOL) suppository 650 mg (has no administration in time range)   sennosides-docusate (PERICOLACE) 8.6-50 MG per tablet 2 tablet (has no administration in time range)     And   polyethylene glycol (MIRALAX) packet 17 g (has no administration in time range)     And   bisacodyl (DULCOLAX) EC tablet 5 mg (has no administration in time range)     And   bisacodyl (DULCOLAX) suppository 10 mg (has no administration in time range)   ondansetron (ZOFRAN) injection 4 mg (has no administration in time range)   Pharmacy to Dose enoxaparin (LOVENOX) (has no administration in time range)   Potassium Replacement - Follow Nurse / BPA Driven Protocol (has no administration in time range)   Magnesium Standard Dose Replacement - Follow Nurse / BPA Driven Protocol (has no administration in time range)   melatonin tablet 5 mg (has no administration in time range)   HYDROcodone-acetaminophen (NORCO) 5-325 MG per tablet 1 tablet (has no administration in time range)   mirtazapine (REMERON) tablet 15 mg (has no administration in time range)   sodium chloride 0.9 % infusion (has no administration in time range)   Enoxaparin Sodium (LOVENOX) syringe 30 mg (has no administration in time range)     Vital Signs:  Temp:  [98 °F (36.7 °C)-98.1 °F (36.7 °C)] 98 °F (36.7 °C)  Heart Rate:  [75-89] 89  Resp:  [16-18] 16  BP: (122-131)/(62-94) 131/62        06/06/23  1240 06/06/23  1801   Weight: 58.5 kg (129 lb) 53.4 kg (117 lb 11.6 oz)     Body mass index is 21.53 kg/m².    Physical Exam:     Most recent vital Signs: /62 (BP Location: Left arm, Patient Position: Lying)   Pulse 89   " Temp 98 °F (36.7 °C) (Oral)   Resp 16   Ht 157.5 cm (62\")   Wt 53.4 kg (117 lb 11.6 oz)   SpO2 96%   BMI 21.53 kg/m²     Physical Exam  Vitals and nursing note reviewed.   Constitutional:       General: She is not in acute distress.     Comments: Generalized weakness noted.   HENT:      Head: Normocephalic and atraumatic.      Right Ear: External ear normal.      Left Ear: External ear normal.      Nose: Nose normal.      Mouth/Throat:      Mouth: Mucous membranes are dry.   Eyes:      Extraocular Movements: Extraocular movements intact.      Conjunctiva/sclera: Conjunctivae normal.      Pupils: Pupils are equal, round, and reactive to light.   Cardiovascular:      Rate and Rhythm: Normal rate and regular rhythm.      Pulses: Normal pulses.      Heart sounds: Normal heart sounds.   Pulmonary:      Effort: Pulmonary effort is normal. No respiratory distress.      Breath sounds: Normal breath sounds. No wheezing or rhonchi.   Abdominal:      General: Bowel sounds are normal. There is no distension.      Palpations: Abdomen is soft.      Tenderness: There is no abdominal tenderness.   Musculoskeletal:         General: Normal range of motion.        Arms:       Cervical back: Normal range of motion and neck supple.      Right lower leg: Tenderness present. No edema.      Left lower leg: No edema.        Legs:       Comments: - RUE: Right lateral breast redness and swelling, tenderness to palpation and movement.  -RLE: Right anterior and medial knee redness and swelling, tender to palpation and with most knee joint range of motions.  -R Foot: Redness, erythema, increased warmth over the first metatarsophalangeal joint with tenderness to palpation and movement.     Skin:     General: Skin is warm and dry.      Findings: No rash.   Neurological:      General: No focal deficit present.      Mental Status: She is alert and oriented to person, place, and time. Mental status is at baseline.      Motor: No weakness. "   Psychiatric:         Mood and Affect: Mood normal.         Behavior: Behavior normal.         Thought Content: Thought content normal.       Laboratory data:    I have reviewed the labs done in the emergency room.    Results from last 7 days   Lab Units 06/06/23  1622   SODIUM mmol/L 137   POTASSIUM mmol/L 3.4*   CHLORIDE mmol/L 97*   CO2 mmol/L 26.0   BUN mg/dL 13   CREATININE mg/dL 0.59   CALCIUM mg/dL 9.2   BILIRUBIN mg/dL 1.1   ALK PHOS U/L 349*   ALT (SGPT) U/L 14   AST (SGOT) U/L 20   GLUCOSE mg/dL 123*     Results from last 7 days   Lab Units 06/06/23  1622   WBC 10*3/mm3 11.18*   HEMOGLOBIN g/dL 11.3*   HEMATOCRIT % 34.1   PLATELETS 10*3/mm3 208                                   Invalid input(s): USDES,  BLOODU, NITRITITE, BACT, EP    Pain Management Panel           No data to display                Radiology:    XR Forearm 2 View Right    Result Date: 6/6/2023  CLINICAL INDICATION:  fall  EXAMINATION TECHNIQUE: XR FOREARM 2 VW RIGHT-  COMPARISON: None.  FINDINGS: No acute fracture or malalignment. No acute soft tissue abnormality. No radiodense foreign bodies. Osseous demineralization. Degenerative changes of the wrist joint and elbow joint.      No acute osseous findings.    Images personally reviewed, interpreted and dictated by GRISELDA Bettencourt.       This report was signed and finalized on 6/6/2023 1:46 PM by GRISELDA Bettencourt.    XR Wrist 3+ View Right    Result Date: 6/6/2023  CLINICAL INDICATION:  fall  EXAMINATION TECHNIQUE: XR WRIST 3+ VW RIGHT-  COMPARISON: None.  FINDINGS: No acute fracture or malalignment. Moderate to severe degenerative changes of the radiocarpal, intercarpal, and carpometacarpal joints. Periarticular calcifications. Chondrocalcinosis. Osseous demineralization. Soft tissue swelling.      Degenerative changes without acute osseous findings. Soft tissue swelling.    Images personally reviewed, interpreted and dictated by GRISELDA Bettencourt.       This report was  signed and finalized on 6/6/2023 1:45 PM by GRISELDA Bettencourt.    XR Knee 3 View Right    Result Date: 6/6/2023  CLINICAL INDICATION:  fall  EXAMINATION TECHNIQUE: XR KNEE 3 VW RIGHT-  COMPARISON: None.  FINDINGS: No acute fracture or malalignment. Moderate tricompartmental osteoarthrosis. Extensive chondrocalcinosis. Medial tilt of the joint line with a depressed medial tibial condyle, chronic. No suprapatellar joint effusion. No acute soft tissue abnormality. No radiodense foreign bodies.      No acute osseous findings. Advanced degenerative changes. Chondrocalcinosis.  Images personally reviewed, interpreted and dictated by GRISELDA Bettencourt.       This report was signed and finalized on 6/6/2023 1:52 PM by GRISELDA Bettencourt.    XR Foot 3+ View Right    Result Date: 6/6/2023  CLINICAL INDICATION:  trauma  EXAMINATION TECHNIQUE: XR FOOT 3+ VW RIGHT-  COMPARISON: None.  FINDINGS: Questionable nondisplaced fractures of the great toe proximal phalangeal distal shaft or artifact. Mild soft tissue swelling of the medial aspect of the forefoot. Mild degenerative changes of the first MTP joint. Osseous demineralization. Degenerative changes of the tibiotalar joint.      No definite acute fracture. Questionable nondisplaced fracture of the great toe proximal phalangeal distal shaft or artifact. Soft tissue swelling. Degenerative changes.  Images personally reviewed, interpreted and dictated by GRISELDA Bettencourt.       This report was signed and finalized on 6/6/2023 1:49 PM by GRISELDA Bettencourt.    CT Head Without Contrast    Result Date: 6/6/2023  HEAD CT     6/6/2023 1:05 PM  HISTORY: Head trauma, minor (Age >= 65y)  TECHNIQUE: Multiple axial CT images were performed from the foramen magnum to the vertex. Coronal reformatted images were reconstructed from axial data set. This study was performed with techniques to keep radiation doses as low as reasonably achievable (ALARA). Individualized dose  reduction techniques using automated exposure control or adjustment of mA and/or kV according to the patient size were employed. 843.84 mGy.cm  COMPARISON: None  FINDINGS: No acute intracranial hemorrhage or large acute cortical infarct. Chronic small vessel ischemic white matter changes and generalized cerebral volume loss are present. Ventricles are prominent. No midline shift. The basal cisterns are patent. Intracranial arterial atherosclerotic calcifications.  No skull fracture. Heterogeneously increased density of the bilateral occipital and posterior parietal bones The visualized paranasal sinuses and mastoid air cells are clear.      No acute intracranial hemorrhage or large acute cortical infarct. Chronic microvascular ischemic white matter changes with global volume loss and mild ventriculomegaly. Heterogeneous appearance of the posterior skull, concerning for sclerotic osseous metastasis. Correlate clinically.   CRITICAL RESULT: No.  COMMUNICATION: Per this written report.  Images personally reviewed, interpreted, and dictated by GRISELDA Bettencourt.          This report was signed and finalized on 6/6/2023 1:40 PM by GRISELDA Bettencourt.    CT Cervical Spine Without Contrast    Result Date: 6/6/2023  CT CERVICAL SPINE     6/6/2023 1:05 PM  HISTORY: Status post fall with neck pain.Neck trauma (Age >= 65y)  COMPARISON:  None.  PROCEDURE: Axial images were obtained from the skull base to the thoracic inlet by computed tomography. 3 D reconstruction images were performed. This study was performed with techniques to keep radiation doses as low as reasonably achievable, (ALARA). Individualized dose reduction techniques using automated exposure control or adjustment of mA and/or kV according to the patient size were employed.   FINDINGS: There is no acute fracture or malalignment of the cervical spine. The facets are normally aligned. The cervical lordosis is maintained. Multilevel degenerative changes are  present with disc space loss, endplate sclerosis, and marginal osteophytosis and associated mild to moderate uncovertebral and facet joint hypertrophy, and disc calcifications resulting in varying degree of mild to moderate spinal canal and neural foramina stenosis, predominantly involving C5-C6 and C6-C7. Moderate acute intra-axial joint degenerative changes.. No acute paraspinal abnormality. Limited images of the lung apices are unremarkable. There are multiple sclerotic lesions noted throughout the cervical spine and upper thoracic spine.      No acute fracture of the cervical spine. Moderate multilevel degenerative changes.  Multiple sclerotic lesions throughout the spine, compatible with blastic osseous metastasis, correlate clinically.     Images personally reviewed, interpreted and dictated by GRISELDA Bettencourt.  This report was signed and finalized on 6/6/2023 1:35 PM by GRISELDA Bettencourt.     Assessment:    Generalized weakness, POA  Possible acute gout, POA  Hypokalemia, POA  Frequent falls, prior to arrival  Recent diagnosis of pancreatic cancer  S/p ERCP and biliary stents  Asthma without exacerbation  Hypertension  Debility    Plan:    Patient is admitted for further management and treatment.    Gout arthritis  -Patient likely with acute gout arthritis in multiple joints, she has a history of gout in the past, not currently taking medicines for gout.   -Will start patient on p.o. prednisone taper as steroids have helped in the past with her gout attacks.    -Pain control as needed with [place] as patient has not been able to tolerate oxycodone at home.    Hypokalemia  -Replace per protocol.    Pancreatic cancer  Known sclerotic lesions throughout thoracic spine   -Following up with [facility].  - CT head showed Heterogeneous appearance of the posterior skull, concerning for sclerotic osseous metastasis. CT C-spine showed multiple sclerotic lesions throughout the spine, compatible with  blastic osseous metastasis.    -We will update the patient and family on those CT findings in a.m. and make sure they know about them.   -Patient and family were considering palliative treatment.    Generalized weakness/debility  Frequent falls  -Multifactorial including advanced age, pancreatic cancer, multiple joints pain/gout.  -Will check urinalysis  -PT/OT consulted.  -Case management consulted, patient will need placement, she lives alone and unable to take care of herself.    -Continue home meds as warranted.  -Further orders as indicated per clinical course.  -Patient declines resuscitation, CPR or intubation.  Patient with full decision-making capacity.  Patient understands about her wishes and was able to verbalize that back. CODE STATUS was ordered as DNR/DNI per her wishes.    -Discussed with the patient and her family at bedside about management and plan, all questions were answered to their satisfaction..    Risk Assessment: Moderate  DVT Prophylaxis: Lovenox prophylaxis (benefit> risk)  Code Status: DNR/DNI  Diet: Cardiac    Advance Care Planning   ACP discussion was held with the patient during this visit. Patient does not have an advance directive, information provided.       Niki Choe MD  06/06/23  20:06 EDT    Dictated utilizing Dragon dictation.    Electronically signed by Niki Choe MD at 06/07/23 0587       Current Facility-Administered Medications   Medication Dose Route Frequency Provider Last Rate Last Admin    acetaminophen (TYLENOL) tablet 650 mg  650 mg Oral Q4H PRN Niki Choe MD        Or    acetaminophen (TYLENOL) 160 MG/5ML solution 650 mg  650 mg Oral Q4H PRN Niki Choe MD        Or    acetaminophen (TYLENOL) suppository 650 mg  650 mg Rectal Q4H PRN Niki Choe MD        sennosides-docusate (PERICOLACE) 8.6-50 MG per tablet 2 tablet  2 tablet Oral BID Niki Choe MD   2 tablet at 06/06/23 2042    And    polyethylene glycol (MIRALAX) packet 17 g  17 g  Oral Daily PRN Niki Choe MD        And    bisacodyl (DULCOLAX) EC tablet 5 mg  5 mg Oral Daily PRN Niki Choe MD        And    bisacodyl (DULCOLAX) suppository 10 mg  10 mg Rectal Daily PRN Niki Choe MD        Diclofenac Sodium (VOLTAREN) 1 % gel 4 g  4 g Topical TID PRN Niki Choe MD        Enoxaparin Sodium (LOVENOX) syringe 30 mg  30 mg Subcutaneous Daily Niki Choe MD   30 mg at 06/07/23 0856    HYDROcodone-acetaminophen (NORCO) 5-325 MG per tablet 1 tablet  1 tablet Oral Q6H PRN Niki Choe MD        Magnesium Standard Dose Replacement - Follow Nurse / BPA Driven Protocol   Does not apply PRN Niik Choe MD        melatonin tablet 5 mg  5 mg Oral Nightly PRN Niki Choe MD        mirtazapine (REMERON) tablet 15 mg  15 mg Oral Nightly Niki Choe MD   15 mg at 06/06/23 2042    ondansetron (ZOFRAN) injection 4 mg  4 mg Intravenous Q6H PRN Niki Choe MD        pantoprazole (PROTONIX) EC tablet 40 mg  40 mg Oral Q AM Niki Choe MD   40 mg at 06/07/23 0610    Pharmacy to Dose enoxaparin (LOVENOX)   Does not apply Continuous PRN Niki Choe MD        Potassium Replacement - Follow Nurse / BPA Driven Protocol   Does not apply PRN Niki Choe MD        predniSONE (DELTASONE) tablet 40 mg  40 mg Oral Daily Niki Choe MD   40 mg at 06/07/23 0856    Followed by    [START ON 6/10/2023] predniSONE (DELTASONE) tablet 20 mg  20 mg Oral Daily Niki Choe MD        Followed by    [START ON 6/13/2023] predniSONE (DELTASONE) tablet 10 mg  10 mg Oral Daily Niki Choe MD        prochlorperazine (COMPAZINE) tablet 10 mg  10 mg Oral Q6H PRN Niki Choe MD        sodium chloride 0.9 % flush 10 mL  10 mL Intravenous PRN Niki Choe MD        sodium chloride 0.9 % flush 10 mL  10 mL Intravenous Q12H Niki Choe MD   10 mL at 06/06/23 2046    sodium chloride 0.9 % flush 10 mL  10 mL Intravenous PRN Niki Choe MD        sodium  chloride 0.9 % infusion 40 mL  40 mL Intravenous PRN Niki Choe MD        sodium chloride 0.9 % infusion  50 mL/hr Intravenous Continuous Niki Choe MD 50 mL/hr at 23 0611 50 mL/hr at 23 06    traMADol (ULTRAM) tablet 50 mg  50 mg Oral Q8H PRN Niki Choe MD   50 mg at 23     Physician Progress Notes (last 24 hours)  Notes from 23 1226 through 23 122   No notes of this type exist for this encounter.          Physical Therapy Notes (last 24 hours)        Checo De Oliveira PT at 23  Version 1 of          Goal Outcome Evaluation:  Plan of Care Reviewed With: patient        Progress: no change  Outcome Evaluation: Pt participated in PT eval this date. Pt received in supine. Reports R LE and UE pain, R wrist swollen and erythematous. Pt transferred to EOB with [place]ios, very little use from R UE due to pain. Pt stood with Rios and ambulated with HHA 12 ft around the bed to the chair. pt is expected to benefit from skilled PTx during this inpatient stay to address deficits in strength, gait and mobilty.          Electronically signed by Checo De Oliveira PT at 23       Checo De Oliveira PT at 23  Version 1 of          Patient Name: Nicole Garcia  : 1940    MRN: [id number]                              Today's Date: 2023       Admit Date: 2023    Visit Dx:     ICD-10-CM ICD-9-CM   1. Weakness  R53.1 780.79     Patient Active Problem List   Diagnosis    Allergic rhinitis due to pollen    Bilateral sensorineural hearing loss    Impacted cerumen    Otitis externa of left ear    Hypertension    Painless jaundice    Asthma    Hypokalemia    Elevated liver enzymes    Acute UTI    Hyponatremia    Pancreatic mass    Hyperbilirubinemia    Elevated CA 19-9 level    Biliary obstruction due to cancer    Pancreas malignancy    Debility     Past Medical History:   Diagnosis Date    Arthritis     Asthma     Hypertension      Past Surgical  History:   Procedure Laterality Date    APPENDECTOMY      BREAST SURGERY      cyst removed    ERCP N/A 1/8/2023    Procedure: ENDOSCOPIC RETROGRADE CHOLANGIOPANCREATOGRAPHY WITH STENT;  Surgeon: Darius Obrien MD;  Location: [facility] ENDOSCOPY;  Service: Gastroenterology;  Laterality: N/A;  Sphincterotomy made. Common bile duct (CBD) bushed for non-gyne cyto. Wall stent 10x 60 placed in CBD.     SKIN CANCER EXCISION      TONSILLECTOMY      TUBAL ABDOMINAL LIGATION        General Information       Row Name 06/07/23 1155          Physical Therapy Time and Intention    Document Type evaluation  -MS     Mode of Treatment physical therapy  -MS       Row Name 06/07/23 1155          General Information    Patient Profile Reviewed yes  -MS     Prior Level of Function independent:;all household mobility  -MS     Existing Precautions/Restrictions fall  -MS     Barriers to Rehab medically complex  -MS       Row Name 06/07/23 1155          Living Environment    People in Home alone  -MS       Row Name 06/07/23 1155          Home Main Entrance    Number of Stairs, Main Entrance none  -MS       Row Name 06/07/23 1155          Stairs Within Home, Primary    Number of Stairs, Within Home, Primary none  -MS       Row Name 06/07/23 1155          Cognition    Orientation Status (Cognition) oriented x 4  -MS       Row Name 06/07/23 1155          Safety Issues, Functional Mobility    Impairments Affecting Function (Mobility) balance;endurance/activity tolerance;strength;pain  -MS               User Key  (r) = Recorded By, (t) = Taken By, (c) = Cosigned By      Initials Name Provider Type    MS Checo De Oliveira PT Physical Therapist                   Mobility       Row Name 06/07/23 1157          Bed Mobility    Bed Mobility supine-sit  -MS     Supine-Sit [place] (Bed Mobility) minimum assist (75% patient effort)  -MS     Assistive Device (Bed Mobility) bed rails;draw sheet;head of bed elevated  -MS       Row Name 06/07/23 115           Sit-Stand Transfer    Sit-Stand [place] (Transfers) minimum assist (75% patient effort)  -MS     Assistive Device (Sit-Stand Transfers) other (see comments)  gait belt, HHA  -MS       Row Name 06/07/23 1157          Gait/Stairs (Locomotion)    [place] Level (Gait) minimum assist (75% patient effort)  -MS     Assistive Device (Gait) other (see comments)  HHA, gait belt  -MS     Distance in Feet (Gait) 12  -MS     Deviations/Abnormal Patterns (Gait) festinating/shuffling  -MS     Right Sided Gait Deviations knee buckling, right side  -MS               User Key  (r) = Recorded By, (t) = Taken By, (c) = Cosigned By      Initials Name Provider Type    Checo Black PT Physical Therapist                   Obj/Interventions       Row Name 06/07/23 1159          Range of Motion Comprehensive    General Range of Motion bilateral lower extremity ROM WFL  -MS     Comment, General Range of Motion R knee ext ROM limited  -MS       Row Name 06/07/23 1159          Strength Comprehensive (MMT)    General Manual Muscle Testing (MMT) Assessment lower extremity strength deficits identified  -MS     Comment, General Manual Muscle Testing (MMT) Assessment R LE 3+/5, L LE 4-/5  -MS       Row Name 06/07/23 1159          Sensory Assessment (Somatosensory)    Sensory Assessment (Somatosensory) sensation intact  -MS               User Key  (r) = Recorded By, (t) = Taken By, (c) = Cosigned By      Initials Name Provider Type    Checo Black, HINA Physical Therapist                   Goals/Plan       Row Name 06/07/23 1206          Bed Mobility Goal 1 (PT)    Activity/Assistive Device (Bed Mobility Goal 1, PT) bed mobility activities, all  -MS     [place] Level/Cues Needed (Bed Mobility Goal 1, PT) modified independence  -MS     Time Frame (Bed Mobility Goal 1, PT) long term goal (LTG);2 weeks  -MS       Row Name 06/07/23 1206          Transfer Goal 1 (PT)    Activity/Assistive Device (Transfer Goal 1, PT)  transfers, all;sit-to-stand/stand-to-sit  -MS     [place] Level/Cues Needed (Transfer Goal 1, PT) standby assist  -MS     Time Frame (Transfer Goal 1, PT) long term goal (LTG);2 weeks  -MS       Row Name 06/07/23 1206          Gait Training Goal 1 (PT)    Activity/Assistive Device (Gait Training Goal 1, PT) gait (walking locomotion);assistive device use;walker, rolling platform  -MS     Distance (Gait Training Goal 1, PT) 150 ft  -MS     Time Frame (Gait Training Goal 1, PT) long term goal (LTG);2 weeks  -MS       Row Name 06/07/23 1206          Therapy Assessment/Plan (PT)    Planned Therapy Interventions (PT) balance training;bed mobility training;gait training;home exercise program;postural re-education;ROM (range of motion);strengthening;stair training;stretching;patient/family education;transfer training  -MS               User Key  (r) = Recorded By, (t) = Taken By, (c) = Cosigned By      Initials Name Provider Type    MS Checo De Oliveira, PT Physical Therapist                   Clinical Impression       Row Name 06/07/23 1156          Pain    Pretreatment Pain Rating 9/10  -MS     Posttreatment Pain Rating 9/10  -MS     Pain Location - Side/Orientation Right  -MS     Pain Location upper  -MS     Pain Location - extremity  -MS     Pre/Posttreatment Pain Comment R LE 5/10 then 4/10 post tx  -MS     Pain Intervention(s) Repositioned  -MS       Row Name 06/07/23 9826          Plan of Care Review    Plan of Care Reviewed With patient  -MS     Progress no change  -MS     Outcome Evaluation Pt participated in PT eval this date. Pt received in supine. Reports R LE and UE pain, R wrist swollen and erythematous. Pt transferred to EOB with Rios, very little use from R UE due to pain. Pt stood with Rios and ambulated with HHA 12 ft around the bed to the chair. pt is expected to benefit from skilled PTx during this inpatient stay to address deficits in strength, gait and mobilty.  -MS       Row Name 06/07/23 6600           Therapy Assessment/Plan (PT)    Patient/Family Therapy Goals Statement (PT) to go home  -MS     Rehab Potential (PT) good, to achieve stated therapy goals  -MS     Criteria for Skilled Interventions Met (PT) yes;meets criteria  -MS     Therapy Frequency (PT) 5 times/wk  -MS       Row Name 06/07/23 1159          Vital Signs    O2 Delivery Pre Treatment room air  -MS     O2 Delivery Intra Treatment room air  -MS     O2 Delivery Post Treatment room air  -MS     Pre Patient Position Supine  -MS     Intra Patient Position Standing  -MS     Post Patient Position Sitting  -MS       Row Name 06/07/23 1159          Positioning and Restraints    Pre-Treatment Position in bed  -MS     Post Treatment Position chair  -MS     In Chair sitting;call light within reach;encouraged to call for assist;exit alarm on  -MS               User Key  (r) = Recorded By, (t) = Taken By, (c) = Cosigned By      Initials Name Provider Type    Checo Black, HINA Physical Therapist                   Outcome Measures       Row Name 06/07/23 1208          How much help from another person do you currently need...    Turning from your back to your side while in flat bed without using bedrails? 3  -MS     Moving from lying on back to sitting on the side of a flat bed without bedrails? 3  -MS     Moving to and from a bed to a chair (including a wheelchair)? 3  -MS     Standing up from a chair using your arms (e.g., wheelchair, bedside chair)? 3  -MS     Climbing 3-5 steps with a railing? 2  -MS     To walk in hospital room? 2  -MS     AM-PAC 6 Clicks Score (PT) 16  -MS     Highest level of mobility 5 --> Static standing  -MS       Row Name 06/07/23 1208          Functional Assessment    Outcome Measure Options AM-PAC 6 Clicks Basic Mobility (PT)  -MS               User Key  (r) = Recorded By, (t) = Taken By, (c) = Cosigned By      Initials Name Provider Type    Checo Black PT Physical Therapist                                 Physical  Therapy Education       Title: PT OT SLP Therapies (In Progress)       Topic: Physical Therapy (In Progress)       Point: Mobility training (Done)       Learning Progress Summary             Patient Acceptance, E, VU by MS at 6/7/2023 1209    Comment: importance of mobility                         Point: Home exercise program (Done)       Learning Progress Summary             Patient Acceptance, E, VU by MS at 6/7/2023 1209    Comment: importance of mobility                         Point: Body mechanics (Not Started)       Learner Progress:  Not documented in this visit.              Point: Precautions (Not Started)       Learner Progress:  Not documented in this visit.                              User Key       Initials Effective Dates Name Provider Type Discipline    MS 07/01/22 -  Checo De Oliveira, PT Physical Therapist PT                  PT Recommendation and Plan  Planned Therapy Interventions (PT): balance training, bed mobility training, gait training, home exercise program, postural re-education, ROM (range of motion), strengthening, stair training, stretching, patient/family education, transfer training  Plan of Care Reviewed With: patient  Progress: no change  Outcome Evaluation: Pt participated in PT eval this date. Pt received in supine. Reports R LE and UE pain, R wrist swollen and erythematous. Pt transferred to EOB with Rios, very little use from R UE due to pain. Pt stood with Rios and ambulated with HHA 12 ft around the bed to the chair. pt is expected to benefit from skilled PTx during this inpatient stay to address deficits in strength, gait and mobilty.     Time Calculation:    PT Charges       Row Name 06/07/23 1209             Time Calculation    Start Time 1118  -MS      PT Received On 06/07/23  -MS      PT Goal Re-Cert Due Date 06/17/23  -MS         Untimed Charges    PT Eval/Re-eval Minutes 45  -MS         Total Minutes    Untimed Charges Total Minutes 45  -MS       Total Minutes 45  -MS                 User Key  (r) = Recorded By, (t) = Taken By, (c) = Cosigned By      Initials Name Provider Type    MS Checo De Oliveira, PT Physical Therapist                  Therapy Charges for Today       Code Description Service Date Service Provider Modifiers Qty    36105560976 HC PT EVAL LOW COMPLEXITY 3 6/7/2023 Checo De Oliveira, PT GP 1            PT G-Codes  Outcome Measure Options: AM-PAC 6 Clicks Basic Mobility (PT)  AM-PAC 6 Clicks Score (PT): 16       Checo De Oliveira PT  6/7/2023      Electronically signed by Checo De Oliveira PT at 06/07/23 1212       Occupational Therapy Notes (last 24 hours)  Notes from 06/06/23 1226 through 06/07/23 1226   No notes exist for this encounter.

## 2023-06-07 NOTE — PROGRESS NOTES
[facility]    Name:  Nicole Garcia   Age:  82 y.o.  Sex:  female  :  1940  MRN:  [id number]   Visit Number:  [id number]  Admission Date:  2023  Date Of Service:  23  Primary Care Physician:  Zaida Marrero APRN     LOS: 0 days :    Chief Complaint:      Weakness, pain, cancer    Subjective:    Patient seen at bedside this morning.  Friend and granddaughter at bedside.  She was apparently unaware that she had had evidence of sclerotic/metastatic bone disease, discussed this was present on PET scan dating back to May, as well as CT scans done yesterday.  She does note she is considering doing palliative chemo, but wants to see if she will get any stronger prior to making any decision.  She is agreeable to rehab placement.    Hospital Course:    Patient is a 82-year-old female with history of metastatic pancreatic cancer followed by [facility], had recent hospitalization and discharge due to acute cholangeitis and was discharged on 29 May.  She has underlying history of asthma and hypertension in addition to gout.  She is retired teacher.  She presented to the emergency room with complaints of weakness, generalized pain, thought that she may have had a gout flare.    In the ER, she was hemodynamically stable and labs were overall stable.  She had imaging performed.  A CT scan of the head showed no hemorrhage or infarct, there were is chronic microvascular changes as well as heterogeneous appearance of the posterior school concerning for sclerotic osseous metastasis.  CT scan of cervical spine with multiple sclerotic appearing lesions throughout the spine compatible with blastic osseous metastasis.  X-rays of the right wrist the right forearm with no fractures and degenerative changes.  X-ray of the right knee with no acute fracture.  X-ray of the right foot with questionable nondisplaced fracture of the great toe proximal phalangeal  distal shaft or artifact noted.  Due to her significant weakness and debility we are asked to admit.    Patient was updated on her findings from her prior PET scan as well as CT scans performed this hospitalization.  Patient is willing to proceed with short-term rehab, they are working on placement at [facility].    Review of Systems:     All systems were reviewed and negative except as mentioned in subjective, assessment and plan.    Vital Signs:    Temp:  [97.5 °F (36.4 °C)-98.7 °F (37.1 °C)] 97.5 °F (36.4 °C)  Heart Rate:  [71-89] 79  Resp:  [16-18] 18  BP: (123-147)/(52-91) 140/85    Intake and output:    I/O last 3 completed shifts:  In: 789 [P.O.:360; I.V.:429]  Out: -   I/O this shift:  In: 240 [P.O.:240]  Out: -     Physical Examination:    General Appearance:  Alert and cooperative.  NAD   Head:  Atraumatic and normocephalic.   Eyes: Conjunctivae and sclerae normal, no icterus. No pallor.   Throat: No oral lesions, no thrush, oral mucosa moist.   Neck: Supple, trachea midline, no thyromegaly.   Lungs:   Breath sounds heard bilaterally equally.  No wheezing or crackles. No Pleural rub or bronchial breathing.   Heart:  Normal S1 and S2, no murmur, no gallop, no rub. No JVD.   Abdomen:   Normal bowel sounds, no masses, no organomegaly. Soft, nontender, nondistended, no rebound tenderness.   Extremities: Appears to be some mild edema of the right wrist and upper extremity compared to the left.  Range of motion appears to be intact.  Of the right lower extremity, there is some mild edema around the great toe but no severe erythema.   Skin: No bleeding or rash.   Neurologic: Alert and oriented x 3. No facial asymmetry. Moves all four limbs. No tremors.      Laboratory results:    Results from last 7 days   Lab Units 06/07/23  0730 06/06/23  1622   SODIUM mmol/L 132* 137   POTASSIUM mmol/L 3.9 3.4*   CHLORIDE mmol/L 98 97*   CO2 mmol/L 22.7 26.0   BUN mg/dL 11 13   CREATININE mg/dL 0.56* 0.59   CALCIUM mg/dL  8.9 9.2   BILIRUBIN mg/dL  --  1.1   ALK PHOS U/L  --  349*   ALT (SGPT) U/L  --  14   AST (SGOT) U/L  --  20   GLUCOSE mg/dL 123* 123*     Results from last 7 days   Lab Units 06/07/23  0730 06/06/23  1622   WBC 10*3/mm3 8.90 11.18*   HEMOGLOBIN g/dL 10.1* 11.3*   HEMATOCRIT % 30.2* 34.1   PLATELETS 10*3/mm3 189 208                 No results for input(s): PHART, AAT7CEA, PO2ART, WON8CHW, BASEEXCESS in the last 8760 hours.   I have reviewed the patient's laboratory results.    Radiology results:    XR Forearm 2 View Right    Result Date: 6/6/2023  CLINICAL INDICATION:  fall  EXAMINATION TECHNIQUE: XR FOREARM 2 VW RIGHT-  COMPARISON: None.  FINDINGS: No acute fracture or malalignment. No acute soft tissue abnormality. No radiodense foreign bodies. Osseous demineralization. Degenerative changes of the wrist joint and elbow joint.      Impression: No acute osseous findings.    Images personally reviewed, interpreted and dictated by GRISELDA Bettencourt.       This report was signed and finalized on 6/6/2023 1:46 PM by GRISELDA Bettencourt.    XR Wrist 3+ View Right    Result Date: 6/6/2023  CLINICAL INDICATION:  fall  EXAMINATION TECHNIQUE: XR WRIST 3+ VW RIGHT-  COMPARISON: None.  FINDINGS: No acute fracture or malalignment. Moderate to severe degenerative changes of the radiocarpal, intercarpal, and carpometacarpal joints. Periarticular calcifications. Chondrocalcinosis. Osseous demineralization. Soft tissue swelling.      Impression: Degenerative changes without acute osseous findings. Soft tissue swelling.    Images personally reviewed, interpreted and dictated by GRISELDA Bettencourt.       This report was signed and finalized on 6/6/2023 1:45 PM by GRISELDA Bettencourt.    XR Knee 3 View Right    Result Date: 6/6/2023  CLINICAL INDICATION:  fall  EXAMINATION TECHNIQUE: XR KNEE 3 VW RIGHT-  COMPARISON: None.  FINDINGS: No acute fracture or malalignment. Moderate tricompartmental osteoarthrosis. Extensive  chondrocalcinosis. Medial tilt of the joint line with a depressed medial tibial condyle, chronic. No suprapatellar joint effusion. No acute soft tissue abnormality. No radiodense foreign bodies.      Impression: No acute osseous findings. Advanced degenerative changes. Chondrocalcinosis.  Images personally reviewed, interpreted and dictated by GRISELDA Bettencourt.       This report was signed and finalized on 6/6/2023 1:52 PM by GRISELDA Bettencourt.    XR Foot 3+ View Right    Result Date: 6/6/2023  CLINICAL INDICATION:  trauma  EXAMINATION TECHNIQUE: XR FOOT 3+ VW RIGHT-  COMPARISON: None.  FINDINGS: Questionable nondisplaced fractures of the great toe proximal phalangeal distal shaft or artifact. Mild soft tissue swelling of the medial aspect of the forefoot. Mild degenerative changes of the first MTP joint. Osseous demineralization. Degenerative changes of the tibiotalar joint.      Impression: No definite acute fracture. Questionable nondisplaced fracture of the great toe proximal phalangeal distal shaft or artifact. Soft tissue swelling. Degenerative changes.  Images personally reviewed, interpreted and dictated by GRISELDA Bettencourt.       This report was signed and finalized on 6/6/2023 1:49 PM by GRISELAD Bettencourt.    CT Head Without Contrast    Result Date: 6/6/2023  HEAD CT     6/6/2023 1:05 PM  HISTORY: Head trauma, minor (Age >= 65y)  TECHNIQUE: Multiple axial CT images were performed from the foramen magnum to the vertex. Coronal reformatted images were reconstructed from axial data set. This study was performed with techniques to keep radiation doses as low as reasonably achievable (ALARA). Individualized dose reduction techniques using automated exposure control or adjustment of mA and/or kV according to the patient size were employed. 843.84 mGy.cm  COMPARISON: None  FINDINGS: No acute intracranial hemorrhage or large acute cortical infarct. Chronic small vessel ischemic white matter  changes and generalized cerebral volume loss are present. Ventricles are prominent. No midline shift. The basal cisterns are patent. Intracranial arterial atherosclerotic calcifications.  No skull fracture. Heterogeneously increased density of the bilateral occipital and posterior parietal bones The visualized paranasal sinuses and mastoid air cells are clear.      Impression: No acute intracranial hemorrhage or large acute cortical infarct. Chronic microvascular ischemic white matter changes with global volume loss and mild ventriculomegaly. Heterogeneous appearance of the posterior skull, concerning for sclerotic osseous metastasis. Correlate clinically.   CRITICAL RESULT: No.  COMMUNICATION: Per this written report.  Images personally reviewed, interpreted, and dictated by GRISELDA Bettencourt.          This report was signed and finalized on 6/6/2023 1:40 PM by GRISELDA Bettencourt.    CT Cervical Spine Without Contrast    Result Date: 6/6/2023  CT CERVICAL SPINE     6/6/2023 1:05 PM  HISTORY: Status post fall with neck pain.Neck trauma (Age >= 65y)  COMPARISON:  None.  PROCEDURE: Axial images were obtained from the skull base to the thoracic inlet by computed tomography. 3 D reconstruction images were performed. This study was performed with techniques to keep radiation doses as low as reasonably achievable, (ALARA). Individualized dose reduction techniques using automated exposure control or adjustment of mA and/or kV according to the patient size were employed.   FINDINGS: There is no acute fracture or malalignment of the cervical spine. The facets are normally aligned. The cervical lordosis is maintained. Multilevel degenerative changes are present with disc space loss, endplate sclerosis, and marginal osteophytosis and associated mild to moderate uncovertebral and facet joint hypertrophy, and disc calcifications resulting in varying degree of mild to moderate spinal canal and neural foramina stenosis,  predominantly involving C5-C6 and C6-C7. Moderate acute intra-axial joint degenerative changes.. No acute paraspinal abnormality. Limited images of the lung apices are unremarkable. There are multiple sclerotic lesions noted throughout the cervical spine and upper thoracic spine.      Impression: No acute fracture of the cervical spine. Moderate multilevel degenerative changes.  Multiple sclerotic lesions throughout the spine, compatible with blastic osseous metastasis, correlate clinically.     Images personally reviewed, interpreted and dictated by GRISELDA Bettencourt.  This report was signed and finalized on 6/6/2023 1:35 PM by GRISELDA Bettencourt.   I have reviewed the patient's radiology reports.    Medication Review:     I have reviewed the patient's active and prn medications.     Problem List:      Pancreas malignancy    Debility      Assessment:    Generalized weakness, POA  Metastatic pancreatic cancer, POA  Recent cholangitis  Asthma without exacerbation  History of gout  Impaired mobility and ADLs    Plan:    Patient was started on prednisone upon presentation due to concern for possible gout versus metastatic disease leading to pain.  She is very hesitant to take narcotics, discussed may have to consider if Tylenol and steroids do not improve symptoms.  Patient will need to be seen by PT and OT, but recommendations will likely be for short-term rehab.  After talking with the patient and her granddaughter at bedside, patient is unsure if she will be moving forward with palliative chemotherapy, has yet to have a port placed or followed up with oncology.  Overall prognosis is very poor due to the above.    DVT Prophylaxis: Lovenox  Code Status: DNR  Diet: As tolerated  Discharge Plan: Short-term rehab    Christina Palmer DO  06/07/23  13:56 EDT    Dictated utilizing Dragon dictation.

## 2023-06-07 NOTE — THERAPY EVALUATION
Patient Name: Nicole Garcia  : 1940    MRN: [id number]                              Today's Date: 2023       Admit Date: 2023    Visit Dx:     ICD-10-CM ICD-9-CM   1. Weakness  R53.1 780.79     Patient Active Problem List   Diagnosis    Allergic rhinitis due to pollen    Bilateral sensorineural hearing loss    Impacted cerumen    Otitis externa of left ear    Hypertension    Painless jaundice    Asthma    Hypokalemia    Elevated liver enzymes    Acute UTI    Hyponatremia    Pancreatic mass    Hyperbilirubinemia    Elevated CA 19-9 level    Biliary obstruction due to cancer    Pancreas malignancy    Debility     Past Medical History:   Diagnosis Date    Arthritis     Asthma     Hypertension      Past Surgical History:   Procedure Laterality Date    APPENDECTOMY      BREAST SURGERY      cyst removed    ERCP N/A 2023    Procedure: ENDOSCOPIC RETROGRADE CHOLANGIOPANCREATOGRAPHY WITH STENT;  Surgeon: Darius Obrien MD;  Location: [facility] ENDOSCOPY;  Service: Gastroenterology;  Laterality: N/A;  Sphincterotomy made. Common bile duct (CBD) bushed for non-gyne cyto. Wall stent 10x 60 placed in CBD.     SKIN CANCER EXCISION      TONSILLECTOMY      TUBAL ABDOMINAL LIGATION        General Information       Row Name 23 1155          Physical Therapy Time and Intention    Document Type evaluation  -MS     Mode of Treatment physical therapy  -MS       Row Name 23 1155          General Information    Patient Profile Reviewed yes  -MS     Prior Level of Function independent:;all household mobility  -MS     Existing Precautions/Restrictions fall  -MS     Barriers to Rehab medically complex  -MS       Row Name 23 1155          Living Environment    People in Home alone  -MS       Row Name 23 1155          Home Main Entrance    Number of Stairs, Main Entrance none  -MS       Row Name 23 1155          Stairs Within Home, Primary    Number of Stairs, Within Home, Primary none  -MS        Row Name 06/07/23 1155          Cognition    Orientation Status (Cognition) oriented x 4  -MS       Row Name 06/07/23 1155          Safety Issues, Functional Mobility    Impairments Affecting Function (Mobility) balance;endurance/activity tolerance;strength;pain  -MS               User Key  (r) = Recorded By, (t) = Taken By, (c) = Cosigned By      Initials Name Provider Type    MS Checo De Oliveira, PT Physical Therapist                   Mobility       Row Name 06/07/23 1157          Bed Mobility    Bed Mobility supine-sit  -MS     Supine-Sit [place] (Bed Mobility) minimum assist (75% patient effort)  -MS     Assistive Device (Bed Mobility) bed rails;draw sheet;head of bed elevated  -MS       Row Name 06/07/23 1157          Sit-Stand Transfer    Sit-Stand [place] (Transfers) minimum assist (75% patient effort)  -MS     Assistive Device (Sit-Stand Transfers) other (see comments)  gait belt, HHA  -MS       Row Name 06/07/23 1157          Gait/Stairs (Locomotion)    [place] Level (Gait) minimum assist (75% patient effort)  -MS     Assistive Device (Gait) other (see comments)  HHA, gait belt  -MS     Distance in Feet (Gait) 12  -MS     Deviations/Abnormal Patterns (Gait) festinating/shuffling  -MS     Right Sided Gait Deviations knee buckling, right side  -MS               User Key  (r) = Recorded By, (t) = Taken By, (c) = Cosigned By      Initials Name Provider Type    MS Checo De Oliveira, PT Physical Therapist                   Obj/Interventions       Row Name 06/07/23 1159          Range of Motion Comprehensive    General Range of Motion bilateral lower extremity ROM WFL  -MS     Comment, General Range of Motion R knee ext ROM limited  -MS       Row Name 06/07/23 1159          Strength Comprehensive (MMT)    General Manual Muscle Testing (MMT) Assessment lower extremity strength deficits identified  -MS     Comment, General Manual Muscle Testing (MMT) Assessment R LE 3+/5, L LE 4-/5  -MS       Row Name  06/07/23 1159          Sensory Assessment (Somatosensory)    Sensory Assessment (Somatosensory) sensation intact  -MS               User Key  (r) = Recorded By, (t) = Taken By, (c) = Cosigned By      Initials Name Provider Type    Checo Black, PT Physical Therapist                   Goals/Plan       Row Name 06/07/23 1206          Bed Mobility Goal 1 (PT)    Activity/Assistive Device (Bed Mobility Goal 1, PT) bed mobility activities, all  -MS     [place] Level/Cues Needed (Bed Mobility Goal 1, PT) modified independence  -MS     Time Frame (Bed Mobility Goal 1, PT) long term goal (LTG);2 weeks  -MS       [facility] Name 06/07/23 1206          Transfer Goal 1 (PT)    Activity/Assistive Device (Transfer Goal 1, PT) transfers, all;sit-to-stand/stand-to-sit  -MS     [place] Level/Cues Needed (Transfer Goal 1, PT) standby assist  -MS     Time Frame (Transfer Goal 1, PT) long term goal (LTG);2 weeks  -MS       [facility] Name 06/07/23 1206          Gait Training Goal 1 (PT)    Activity/Assistive Device (Gait Training Goal 1, PT) gait (walking locomotion);assistive device use;walker, rolling platform  -MS     Distance (Gait Training Goal 1, PT) 150 ft  -MS     Time Frame (Gait Training Goal 1, PT) long term goal (LTG);2 weeks  -MS       [facility] Name 06/07/23 1206          Therapy Assessment/Plan (PT)    Planned Therapy Interventions (PT) balance training;bed mobility training;gait training;home exercise program;postural re-education;ROM (range of motion);strengthening;stair training;stretching;patient/family education;transfer training  -MS               User Key  (r) = Recorded By, (t) = Taken By, (c) = Cosigned By      Initials Name Provider Type    Checo Black, PT Physical Therapist                   Clinical Impression       Row Name 06/07/23 1159          Pain    Pretreatment Pain Rating 9/10  -MS     Posttreatment Pain Rating 9/10  -MS     Pain Location - Side/Orientation Right  -MS     Pain Location upper   -MS     Pain Location - extremity  -MS     Pre/Posttreatment Pain Comment R LE 5/10 then 4/10 post tx  -MS     Pain Intervention(s) Repositioned  -MS       Row Name 06/07/23 1159          Plan of Care Review    Plan of Care Reviewed With patient  -MS     Progress no change  -MS     Outcome Evaluation Pt participated in PT eval this date. Pt received in supine. Reports R LE and UE pain, R wrist swollen and erythematous. Pt transferred to EOB with Rios, very little use from R UE due to pain. Pt stood with Rios and ambulated with HHA 12 ft around the bed to the chair. pt is expected to benefit from skilled PTx during this inpatient stay to address deficits in strength, gait and mobilty.  -MS       Row Name 06/07/23 1157          Therapy Assessment/Plan (PT)    Patient/Family Therapy Goals Statement (PT) to go home  -MS     Rehab Potential (PT) good, to achieve stated therapy goals  -MS     Criteria for Skilled Interventions Met (PT) yes;meets criteria  -MS     Therapy Frequency (PT) 5 times/wk  -MS       Row Name 06/07/23 1158          Vital Signs    O2 Delivery Pre Treatment room air  -MS     O2 Delivery Intra Treatment room air  -MS     O2 Delivery Post Treatment room air  -MS     Pre Patient Position Supine  -MS     Intra Patient Position Standing  -MS     Post Patient Position Sitting  -MS       Row Name 06/07/23 1151          Positioning and Restraints    Pre-Treatment Position in bed  -MS     Post Treatment Position chair  -MS     In Chair sitting;call light within reach;encouraged to call for assist;exit alarm on  -MS               User Key  (r) = Recorded By, (t) = Taken By, (c) = Cosigned By      Initials Name Provider Type    hCeco Black, PT Physical Therapist                   Outcome Measures       Row Name 06/07/23 1206          How much help from another person do you currently need...    Turning from your back to your side while in flat bed without using bedrails? 3  -MS     Moving from lying on  back to sitting on the side of a flat bed without bedrails? 3  -MS     Moving to and from a bed to a chair (including a wheelchair)? 3  -MS     Standing up from a chair using your arms (e.g., wheelchair, bedside chair)? 3  -MS     Climbing 3-5 steps with a railing? 2  -MS     To walk in hospital room? 2  -MS     AM-PAC 6 Clicks Score (PT) 16  -MS     Highest level of mobility 5 --> Static standing  -MS       Row Name 06/07/23 1208          Functional Assessment    Outcome Measure Options AM-PAC 6 Clicks Basic Mobility (PT)  -MS               User Key  (r) = Recorded By, (t) = Taken By, (c) = Cosigned By      Initials Name Provider Type    MS Checo De Oliveira PT Physical Therapist                                 Physical Therapy Education       Title: PT OT SLP Therapies (In Progress)       Topic: Physical Therapy (In Progress)       Point: Mobility training (Done)       Learning Progress Summary             Patient Acceptance, E, VU by MS at 6/7/2023 1209    Comment: importance of mobility                         Point: Home exercise program (Done)       Learning Progress Summary             Patient Acceptance, E, VU by MS at 6/7/2023 1209    Comment: importance of mobility                         Point: Body mechanics (Not Started)       Learner Progress:  Not documented in this visit.              Point: Precautions (Not Started)       Learner Progress:  Not documented in this visit.                              User Key       Initials Effective Dates Name Provider Type Discipline    MS 07/01/22 -  Checo De Oliveira PT Physical Therapist PT                  PT Recommendation and Plan  Planned Therapy Interventions (PT): balance training, bed mobility training, gait training, home exercise program, postural re-education, ROM (range of motion), strengthening, stair training, stretching, patient/family education, transfer training  Plan of Care Reviewed With: patient  Progress: no change  Outcome Evaluation: Pt  participated in PT eval this date. Pt received in supine. Reports R LE and UE pain, R wrist swollen and erythematous. Pt transferred to EOB with Rios, very little use from R UE due to pain. Pt stood with Rios and ambulated with HHA 12 ft around the bed to the chair. pt is expected to benefit from skilled PTx during this inpatient stay to address deficits in strength, gait and mobilty.     Time Calculation:    PT Charges       Row Name 06/07/23 1209             Time Calculation    Start Time 1118  -MS      PT Received On 06/07/23  -MS      PT Goal Re-Cert Due Date 06/17/23  -MS         Untimed Charges    PT Eval/Re-eval Minutes 45  -MS         Total Minutes    Untimed Charges Total Minutes 45  -MS       Total Minutes 45  -MS                User Key  (r) = Recorded By, (t) = Taken By, (c) = Cosigned By      Initials Name Provider Type    Checo Black, PT Physical Therapist                  Therapy Charges for Today       Code Description Service Date Service Provider Modifiers Qty    [id number] HC PT EVAL LOW COMPLEXITY 3 6/7/2023 Checo De Oliveira PT GP 1            PT G-Codes  Outcome Measure Options: AM-PAC 6 Clicks Basic Mobility (PT)  AM-PAC 6 Clicks Score (PT): 16       Checo De Oliveira PT  6/7/2023

## 2023-06-07 NOTE — CASE MANAGEMENT/SOCIAL WORK
Discharge Planning Assessment  [facility]     Patient Name: Nicole Garcia  MRN: [id number]  Today's Date: 6/7/2023    Admit Date: 6/6/2023    Plan: home with home health versus rehab   Discharge Needs Assessment       Row Name 06/07/23 0920       Living Environment    Current Living Arrangements condominium    Duration at Residence [facility]    Potentially Unsafe Housing Conditions --  none    Primary Care Provided by self    Family Caregiver if Needed child(xin), adult    Family Caregiver Names Fernando garcia son  and daughter in law       Resource/Environmental Concerns    Transportation Concerns none       Food Insecurity    Within the past 12 months, you worried that your food would run out before you got the money to buy more. Never true    Within the past 12 months, the food you bought just didn't last and you didn't have money to get more. Never true       Transition Planning    Patient/Family Anticipates Transition to inpatient rehabilitation facility;home    Patient/Family Anticipated Services at Transition rehabilitation services    Transportation Anticipated family or friend will provide       Discharge Needs Assessment    Readmission Within the Last 30 Days no previous admission in last 30 days    Current Outpatient/Agency/Support Group homecare agency    Equipment Currently Used at Home rollator    Anticipated Changes Related to Illness inability to care for self    Equipment Needed After Discharge none    Discharge Facility/Level of Care Needs rehabilitation facility                   Discharge Plan       Row Name 06/07/23 0936       Plan    Plan home with home health versus rehab    Roadmap to Recovery Yes    Plan Comments up unitl Novmember of last year had been independent of ADL's  including driving.   Lives at [facility].   Son  fernando Garcia  and duaghter  in law  and  a friend  has been  assisting as needed.   Has [facility] health  [facility] not know [facility]  [facility]  [facility].  Was discharged  from [facility]  last month.  No oxygen,    has a rollator.   Patient states she wishes to return home  at  discharge with   home health.     Due to frequent falls  family wishers her to go to [place] to rehab .                  Continued Care and Services - Admitted Since 6/6/2023    Coordination has not been started for this encounter.       Expected Discharge Date and Time       Expected Discharge Date Expected Discharge Time    Jun 9, 2023            Demographic Summary       Row Name 06/07/23 0914       General Information    Admission Type observation    Required Notices Provided Observation Status Notice    Referral Source admission list    Reason for Consult discharge planning    Preferred Language English                   Functional Status       Row Name 06/07/23 0914       Functional Status    Usual Activity Tolerance fair    Current Activity Tolerance poor       Assessment of Health Literacy    How often do you have someone help you read hospital materials? Occasionally    How often do you have problems learning about your medical condition because of difficulty understanding written information? Occasionally    How often do you have a problem understanding what is told to you about your medical condition? Occasionally       Functional Status, IADL    Medications independent    Meal Preparation independent    Housekeeping independent    Laundry independent    Shopping assistive person       Employment/    Employment Status retired                   Psychosocial    No documentation.                  Abuse/Neglect    No documentation.                  Legal       Row Name 06/07/23 0919       Financial/Legal    Source of Income social security    Financial/Environmental Concerns --  no issues                   Substance Abuse    No documentation.                  Patient Forms    No documentation.                     Sharon Jacob RN

## 2023-06-07 NOTE — THERAPY EVALUATION
Patient Name: Nicole Garcia  : 1940    MRN: [id number]                              Today's Date: 2023       Admit Date: 2023    Visit Dx:     ICD-10-CM ICD-9-CM   1. 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 Common bile duct (CBD) bushed for non-gyne cyto. Wall stent 10x 60 placed in CBD.     SKIN CANCER EXCISION      TONSILLECTOMY      TUBAL ABDOMINAL LIGATION        General Information       Row Name 23 1314          OT Time and Intention    Document Type evaluation  -     Mode of Treatment occupational therapy  -       Row Name 23 1314          General Information    Patient Profile Reviewed yes  -     Prior Level of Function min assist:;ADL's;all household mobility  since coming home from  a couple of weeks ago  -     Existing Precautions/Restrictions fall  -     Barriers to Rehab medically complex;previous functional deficit  -       Row Name 23 1314          Occupational Profile    Reason for Services/Referral (Occupational Profile) ADL decline  -       Row Name 23 1314          Living Environment    People in Home alone  [facility]        [facility] Name 06/07/23 1314          Home Main Entrance    Number of Stairs, Main Entrance none  -[facility] Name 06/07/23 1314          Stairs Within Home, Primary    Number of Stairs, Within Home, Primary none  -[facility] Name 06/07/23 1314          Cognition    Orientation Status (Cognition) oriented x 4  -[facility] Name 06/07/23 1314          Safety Issues, Functional Mobility    Safety Issues Affecting Function (Mobility) safety precaution awareness;safety precautions follow-through/compliance  -     Impairments Affecting Function (Mobility) balance;endurance/activity tolerance;strength;pain  -               User Key  (r) = Recorded By, (t) = Taken By, (c) = Cosigned By      Initials Name Provider Type     Dee Arzola Occupational Therapist                     Mobility/ADL's       [facility] Name 06/07/23 1318          Bed Mobility    Bed Mobility supine-sit  -     Supine-Sit [employer] (Bed Mobility) minimum assist (75% patient effort)  -     Assistive Device (Bed Mobility) bed rails;draw sheet;head of bed elevated  -[facility] Name 06/07/23 1318          Sit-Stand Transfer    Sit-Stand [employer] (Transfers) minimum assist (75% patient effort)  -     Assistive Device (Sit-Stand Transfers) other (see comments)  -[facility] Name 06/07/23 1318          Functional Mobility    Functional Mobility- Ind. Level minimum assist (75% patient effort)  -     Functional Mobility- Device other (see comments)  HHA and gait belt  -     Functional Mobility-Distance (Feet) 12  -[facility] Name 06/07/23 1318          Activities of Daily Living    BADL Assessment/Intervention bathing;upper body dressing;lower body dressing;grooming;feeding;toileting  -[facility] Name 06/07/23 1318          Bathing Assessment/Intervention    [employer] Level (Bathing) moderate assist (50% patient effort)  -[facility] Name 06/07/23 1318          Upper Body Dressing Assessment/Training    [employer] Level (Upper Body  Dressing) minimum assist (75% patient effort)  -[facility] Name 06/07/23 1318          Lower Body Dressing Assessment/Training    [place] Level (Lower Body Dressing) moderate assist (50% patient effort)  -[facility] Name 06/07/23 1318          Grooming Assessment/Training    [place] Level (Grooming) minimum assist (75% patient effort)  -AH       Row Name 06/07/23 1318          Self-Feeding Assessment/Training    [place] Level (Feeding) set up  -AH       Row Name 06/07/23 1318          Toileting Assessment/Training    [place] Level (Toileting) moderate assist (50% patient effort)  -               User Key  (r) = Recorded By, (t) = Taken By, (c) = Cosigned By      Initials Name Provider Type     Dee Arzola Occupational Therapist                   Obj/Interventions       [facility] Name 06/07/23 1321          Sensory Assessment (Somatosensory)    Sensory Assessment (Somatosensory) sensation intact  -AH       Row Name 06/07/23 1321          Vision Assessment/Intervention    Visual Impairment/Limitations WFL;corrective lenses full-time  -AH       Row Name 06/07/23 1321          Range of Motion Comprehensive    Comment, General Range of Motion BUE WFL except R wrist and hand  -AH       Row Name 06/07/23 1321          Strength Comprehensive (MMT)    Comment, General Manual Muscle Testing (MMT) Assessment LUE4-/5, RUE 3+/5  -               User Key  (r) = Recorded By, (t) = Taken By, (c) = Cosigned By      Initials Name Provider Type     Dee Arzola Occupational Therapist                   Goals/Plan       Row Name 06/07/23 1329          Bed Mobility Goal 1 (OT)    Activity/Assistive Device (Bed Mobility Goal 1, OT) bed mobility activities, all  -     [place] Level/Cues Needed (Bed Mobility Goal 1, OT) supervision required  -     Time Frame (Bed Mobility Goal 1, OT) by discharge  -     Progress/Outcomes (Bed Mobility Goal 1, OT) goal ongoing  -[facility] Name 06/07/23 1323           Transfer Goal 1 (OT)    Activity/Assistive Device (Transfer Goal 1, OT) sit-to-stand/stand-to-sit;walker, rolling platform  -     [place] Level/Cues Needed (Transfer Goal 1, OT) supervision required  -     Time Frame (Transfer Goal 1, OT) long term goal (LTG);5 days  -     Progress/Outcome (Transfer Goal 1, OT) goal ongoing  -       Row Name 06/07/23 1329          Bathing Goal 1 (OT)    Activity/Device (Bathing Goal 1, OT) bathing skills, all  -     [place] Level/Cues Needed (Bathing Goal 1, OT) minimum assist (75% or more patient effort)  -     Time Frame (Bathing Goal 1, OT) long term goal (LTG);5 days  -     Progress/Outcomes (Bathing Goal 1, OT) goal ongoing  -       Row Name 06/07/23 1329          Dressing Goal 1 (OT)    Activity/Device (Dressing Goal 1, OT) lower body dressing  -     [place]/Cues Needed (Dressing Goal 1, OT) minimum assist (75% or more patient effort)  -     Time Frame (Dressing Goal 1, OT) by discharge  -     Progress/Outcome (Dressing Goal 1, OT) goal ongoing  -       Row Name 06/07/23 1329          Toileting Goal 1 (OT)    Activity/Device (Toileting Goal 1, OT) adjust/manage clothing;perform perineal hygiene  -     [place] Level/Cues Needed (Toileting Goal 1, OT) contact guard required  -     Time Frame (Toileting Goal 1, OT) by discharge  -     Progress/Outcome (Toileting Goal 1, OT) goal ongoing  -       Row Name 06/07/23 1329          ROM Goal 1 (OT)    ROM Goal 1 (OT) Pt will perform BUE AROM ex to improve her functional ROM for ADL tasks.  -     Time Frame (ROM Goal 1, OT) by discharge  -     Progress/Outcome (ROM Goal 1, OT) goal ongoing  -       Row Name 06/07/23 1329          Therapy Assessment/Plan (OT)    Planned Therapy Interventions (OT) activity tolerance training;BADL retraining;patient/caregiver education/training;transfer/mobility retraining;strengthening exercise  -               User Key  (r) = Recorded By, (t)  = Taken By, (c) = Cosigned By      Initials Name Provider Type     Dee Arzola Occupational Therapist                   Clinical Impression       [facility] Name 06/07/23 1322          Pain Assessment    Pretreatment Pain Rating 9/10  -     Posttreatment Pain Rating 9/10  [facility]     Pain Location - Side/Orientation Right  -     Pain Location upper  -     Pain Location - extremity  -     Pre/Posttreatment Pain Comment RLE 5/10 pre-tx, 4/10 post tx  -     Pain Intervention(s) Repositioned;Ambulation/increased activity  -[facility] Name 06/07/23 1322          Plan of Care Review    Plan of Care Reviewed With patient  -     Progress no change  -     Outcome Evaluation Pt seen for OT evaluation today.  Pt presents with weakness and decreased independence with self care and functional mobility tasks.  Pt is min to mod assist for ADL tasks d/t decreased endurance and pain in her R wrist/hand.  Pt noted with swelling and redness R wrist area.  Pt able to sit eob with min assist, stood with min assist and walked 12' with min assist and HHA/gait belt.  Pt is expected to benefit from skilled OT to improve her strength and independence with ADL tasks.  -[facility] Name 06/07/23 1322          Therapy Assessment/Plan (OT)    Patient/Family Therapy Goal Statement (OT) d/c rehab  -     Rehab Potential (OT) good, to achieve stated therapy goals  -     Criteria for Skilled Therapeutic Interventions Met (OT) yes;skilled treatment is necessary  -     Therapy Frequency (OT) 3 times/wk  -[facility] Name 06/07/23 1322          Therapy Plan Review/Discharge Plan (OT)    Anticipated Discharge Disposition (OT) inpatient rehabilitation facility  -[facility] Name 06/07/23 1322          Positioning and Restraints    Pre-Treatment Position in bed  -     Post Treatment Position chair  -     In Chair sitting;call light within reach;encouraged to call for assist;exit alarm on  -               User Key  (r) = Recorded  By, (t) = Taken By, (c) = Cosigned By      Initials Name Provider Type    Dee Christianson Occupational Therapist                   Outcome Measures       Row Name 06/07/23 1331          How much help from another is currently needed...    Putting on and taking off regular lower body clothing? 2  -AH     Bathing (including washing, rinsing, and drying) 2  -AH     Toileting (which includes using toilet bed pan or urinal) 3  -AH     Putting on and taking off regular upper body clothing 3  -AH     Taking care of personal grooming (such as brushing teeth) 3  -AH     Eating meals 3  -AH     AM-PAC 6 Clicks Score (OT) 16  -AH       Row Name 06/07/23 1208          How much help from another person do you currently need...    Turning from your back to your side while in flat bed without using bedrails? 3  -MS     Moving from lying on back to sitting on the side of a flat bed without bedrails? 3  -MS     Moving to and from a bed to a chair (including a wheelchair)? 3  -MS     Standing up from a chair using your arms (e.g., wheelchair, bedside chair)? 3  -MS     Climbing 3-5 steps with a railing? 2  -MS     To walk in hospital room? 2  -MS     AM-PAC 6 Clicks Score (PT) 16  -MS     Highest level of mobility 5 --> Static standing  -MS       Row Name 06/07/23 1331 06/07/23 1208       Functional Assessment    Outcome Measure Options AM-PAC 6 Clicks Daily Activity (OT)  - AM-PAC 6 Clicks Basic Mobility (PT)  -MS              User Key  (r) = Recorded By, (t) = Taken By, (c) = Cosigned By      Initials Name Provider Type    Dee Christianson Occupational Therapist    Checo Black, PT Physical Therapist                    Occupational Therapy Education       Title: PT OT SLP Therapies (In Progress)       Topic: Occupational Therapy (In Progress)       Point: ADL training (Done)       Description:   Instruct learner(s) on proper safety adaptation and remediation techniques during self care or transfers.   Instruct in proper  use of assistive devices.                  Learning Progress Summary             Patient Acceptance, E,TB, VU by  at 6/7/2023 [id number]    Comment: Role of OT/POC                         Point: Home exercise program (Not Started)       Description:   Instruct learner(s) on appropriate technique for monitoring, assisting and/or progressing therapeutic exercises/activities.                  Learner Progress:  Not documented in this visit.              Point: Precautions (Not Started)       Description:   Instruct learner(s) on prescribed precautions during self-care and functional transfers.                  Learner Progress:  Not documented in this visit.              Point: Body mechanics (Not Started)       Description:   Instruct learner(s) on proper positioning and spine alignment during self-care, functional mobility activities and/or exercises.                  Learner Progress:  Not documented in this visit.                              User Key       Initials Effective Dates Name Provider Type Discipline     06/16/21 -  Dee Arzola Occupational Therapist OT                  OT Recommendation and Plan  Planned Therapy Interventions (OT): activity tolerance training, BADL retraining, patient/caregiver education/training, transfer/mobility retraining, strengthening exercise  Therapy Frequency (OT): 3 times/wk  Plan of Care Review  Plan of Care Reviewed With: patient  Progress: no change  Outcome Evaluation: Pt seen for OT evaluation today.  Pt presents with weakness and decreased independence with self care and functional mobility tasks.  Pt is min to mod assist for ADL tasks d/t decreased endurance and pain in her R wrist/hand.  Pt noted with swelling and redness R wrist area.  Pt able to sit eob with min assist, stood with min assist and walked 12' with min assist and HHA/gait belt.  Pt is expected to benefit from skilled OT to improve her strength and independence with ADL tasks.     Time Calculation:     Time Calculation- OT       Row Name 06/07/23 1332             Time Calculation- OT    OT Start Time 1119  -      OT Received On 06/07/23  -      OT Goal Re-Cert Due Date 06/17/23  -         Untimed Charges    OT Eval/Re-eval Minutes 42  -         Total Minutes    Untimed Charges Total Minutes 42  -       Total Minutes 42  -                User Key  (r) = Recorded By, (t) = Taken By, (c) = Cosigned By      Initials Name Provider Type    Dee Christianson Occupational Therapist                  Therapy Charges for Today       Code Description Service Date Service Provider Modifiers Qty    [id number]  OT EVAL MOD COMPLEXITY 3 6/7/2023 Dee Arzola GO 1                 Dee Arzola  6/7/2023
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
## 2023-06-08 PROBLEM — E44.0 MODERATE MALNUTRITION: Status: ACTIVE | Noted: 2023-06-08

## 2023-06-08 PROCEDURE — 97530 THERAPEUTIC ACTIVITIES: CPT

## 2023-06-08 PROCEDURE — G0378 HOSPITAL OBSERVATION PER HR: HCPCS

## 2023-06-08 PROCEDURE — 25010000002 ENOXAPARIN PER 10 MG: Performed by: FAMILY MEDICINE

## 2023-06-08 PROCEDURE — 96365 THER/PROPH/DIAG IV INF INIT: CPT

## 2023-06-08 PROCEDURE — 96366 THER/PROPH/DIAG IV INF ADDON: CPT

## 2023-06-08 PROCEDURE — 97535 SELF CARE MNGMENT TRAINING: CPT

## 2023-06-08 PROCEDURE — 97116 GAIT TRAINING THERAPY: CPT

## 2023-06-08 PROCEDURE — 63710000001 PREDNISONE PER 1 MG: Performed by: FAMILY MEDICINE

## 2023-06-08 PROCEDURE — 99232 SBSQ HOSP IP/OBS MODERATE 35: CPT | Performed by: FAMILY MEDICINE

## 2023-06-08 PROCEDURE — 25010000002 MAGNESIUM SULFATE 2 GM/50ML SOLUTION: Performed by: FAMILY MEDICINE

## 2023-06-08 PROCEDURE — 96372 THER/PROPH/DIAG INJ SC/IM: CPT

## 2023-06-08 RX ORDER — MAGNESIUM SULFATE HEPTAHYDRATE 40 MG/ML
2 INJECTION, SOLUTION INTRAVENOUS
Status: COMPLETED | OUTPATIENT
Start: 2023-06-08 | End: 2023-06-08

## 2023-06-08 RX ADMIN — Medication 10 ML: at 21:37

## 2023-06-08 RX ADMIN — MAGNESIUM SULFATE HEPTAHYDRATE 2 G: 40 INJECTION, SOLUTION INTRAVENOUS at 10:50

## 2023-06-08 RX ADMIN — ENOXAPARIN SODIUM 30 MG: 30 INJECTION SUBCUTANEOUS at 08:53

## 2023-06-08 RX ADMIN — PANTOPRAZOLE SODIUM 40 MG: 40 TABLET, DELAYED RELEASE ORAL at 06:21

## 2023-06-08 RX ADMIN — MAGNESIUM SULFATE HEPTAHYDRATE 2 G: 40 INJECTION, SOLUTION INTRAVENOUS at 16:16

## 2023-06-08 RX ADMIN — MIRTAZAPINE 15 MG: 15 TABLET, FILM COATED ORAL at 21:37

## 2023-06-08 RX ADMIN — MAGNESIUM SULFATE HEPTAHYDRATE 2 G: 40 INJECTION, SOLUTION INTRAVENOUS at 13:25

## 2023-06-08 RX ADMIN — PREDNISONE 40 MG: 20 TABLET ORAL at 08:52

## 2023-06-08 RX ADMIN — SODIUM CHLORIDE 50 ML/HR: 9 INJECTION, SOLUTION INTRAVENOUS at 21:37

## 2023-06-08 NOTE — PROGRESS NOTES
[facility]    Name:  Nicole Garcia   Age:  82 y.o.  Sex:  female  :  1940  MRN:  [id number]   Visit Number:  [id number]  Admission Date:  2023  Date Of Service:  23  Primary Care Physician:  Zaida Marrero APRN     LOS: 0 days :    Chief Complaint:      Weakness, pain, cancer    Subjective:    Patient feeling better today.  More use of her right hand.  Still some soreness.  Appetite okay.  Discussed magnesium replacement.  She is still agreeable to placement.    Hospital Course:    Patient is a 82-year-old female with history of metastatic pancreatic cancer followed by [facility], had recent hospitalization and discharge due to acute cholangeitis and was discharged on 29 May.  She has underlying history of asthma and hypertension in addition to gout.  She is retired teacher.  She presented to the emergency room with complaints of weakness, generalized pain, thought that she may have had a gout flare.    In the ER, she was hemodynamically stable and labs were overall stable.  She had imaging performed.  A CT scan of the head showed no hemorrhage or infarct, there were is chronic microvascular changes as well as heterogeneous appearance of the posterior skull concerning for sclerotic osseous metastasis.  CT scan of cervical spine with multiple sclerotic appearing lesions throughout the spine compatible with blastic osseous metastasis.  X-rays of the right wrist the right forearm with no fractures and degenerative changes.  X-ray of the right knee with no acute fracture.  X-ray of the right foot with questionable nondisplaced fracture of the great toe proximal phalangeal distal shaft or artifact noted.  Due to her significant weakness and debility we are asked to admit.    Patient was updated on her findings from her prior PET scan as well as CT scans performed this hospitalization.  Patient is willing to proceed with short-term rehab,  they are working on placement at [facility].    Review of Systems:     All systems were reviewed and negative except as mentioned in subjective, assessment and plan.    Vital Signs:    Temp:  [97.5 °F (36.4 °C)-98.2 °F (36.8 °C)] 98.2 °F (36.8 °C)  Heart Rate:  [69-79] 79  Resp:  [16-18] 16  BP: (127-152)/(61-85) 152/77    Intake and output:    I/O last 3 completed shifts:  In: 1629 [P.O.:1200; I.V.:429]  Out: -   No intake/output data recorded.    Physical Examination:    General Appearance:  Alert and cooperative.  NAD   Head:  Atraumatic and normocephalic.   Eyes: Conjunctivae and sclerae normal, no icterus. No pallor.   Throat: No oral lesions, no thrush, oral mucosa moist.   Neck: Supple, trachea midline, no thyromegaly.   Lungs:   Breath sounds heard bilaterally equally.  No wheezing or crackles. No Pleural rub or bronchial breathing.   Heart:  Normal S1 and S2, no murmur, no gallop, no rub. No JVD.   Abdomen:   Normal bowel sounds, no masses, no organomegaly. Soft, nontender, nondistended, no rebound tenderness.   Extremities: Appears to be some mild edema of the right wrist and upper extremity compared to the left but improved today.  Range of motion appears to be intact.  Of the right lower extremity, there is some mild edema around the great toe but no severe erythema.   Skin: No bleeding or rash.   Neurologic: Alert and oriented x 3. No facial asymmetry. Moves all four limbs. No tremors.      Laboratory results:    Results from last 7 days   Lab Units 06/07/23  0730 06/06/23  1622   SODIUM mmol/L 132* 137   POTASSIUM mmol/L 3.9 3.4*   CHLORIDE mmol/L 98 97*   CO2 mmol/L 22.7 26.0   BUN mg/dL 11 13   CREATININE mg/dL 0.56* 0.59   CALCIUM mg/dL 8.9 9.2   BILIRUBIN mg/dL  --  1.1   ALK PHOS U/L  --  349*   ALT (SGPT) U/L  --  14   AST (SGOT) U/L  --  20   GLUCOSE mg/dL 123* 123*       Results from last 7 days   Lab Units 06/07/23  0730 06/06/23  1622   WBC 10*3/mm3 8.90 11.18*   HEMOGLOBIN g/dL 10.1*  11.3*   HEMATOCRIT % 30.2* 34.1   PLATELETS 10*3/mm3 189 208                   No results for input(s): PHART, SXW1EQB, PO2ART, GXU4DAZ, BASEEXCESS in the last 8760 hours.   I have reviewed the patient's laboratory results.    Radiology results:    XR Forearm 2 View Right    Result Date: 6/6/2023  CLINICAL INDICATION:  fall  EXAMINATION TECHNIQUE: XR FOREARM 2 VW RIGHT-  COMPARISON: None.  FINDINGS: No acute fracture or malalignment. No acute soft tissue abnormality. No radiodense foreign bodies. Osseous demineralization. Degenerative changes of the wrist joint and elbow joint.      Impression: No acute osseous findings.    Images personally reviewed, interpreted and dictated by GRISELDA Bettencourt.       This report was signed and finalized on 6/6/2023 1:46 PM by GRISELDA Bettencourt.    XR Wrist 3+ View Right    Result Date: 6/6/2023  CLINICAL INDICATION:  fall  EXAMINATION TECHNIQUE: XR WRIST 3+ VW RIGHT-  COMPARISON: None.  FINDINGS: No acute fracture or malalignment. Moderate to severe degenerative changes of the radiocarpal, intercarpal, and carpometacarpal joints. Periarticular calcifications. Chondrocalcinosis. Osseous demineralization. Soft tissue swelling.      Impression: Degenerative changes without acute osseous findings. Soft tissue swelling.    Images personally reviewed, interpreted and dictated by GRISELDA Bettencourt.       This report was signed and finalized on 6/6/2023 1:45 PM by GRISELDA Bettencourt.    XR Knee 3 View Right    Result Date: 6/6/2023  CLINICAL INDICATION:  fall  EXAMINATION TECHNIQUE: XR KNEE 3 VW RIGHT-  COMPARISON: None.  FINDINGS: No acute fracture or malalignment. Moderate tricompartmental osteoarthrosis. Extensive chondrocalcinosis. Medial tilt of the joint line with a depressed medial tibial condyle, chronic. No suprapatellar joint effusion. No acute soft tissue abnormality. No radiodense foreign bodies.      Impression: No acute osseous findings. Advanced degenerative  changes. Chondrocalcinosis.  Images personally reviewed, interpreted and dictated by GRISELDA Bettencourt.       This report was signed and finalized on 6/6/2023 1:52 PM by GRISELDA Bettencourt.    XR Foot 3+ View Right    Result Date: 6/6/2023  CLINICAL INDICATION:  trauma  EXAMINATION TECHNIQUE: XR FOOT 3+ VW RIGHT-  COMPARISON: None.  FINDINGS: Questionable nondisplaced fractures of the great toe proximal phalangeal distal shaft or artifact. Mild soft tissue swelling of the medial aspect of the forefoot. Mild degenerative changes of the first MTP joint. Osseous demineralization. Degenerative changes of the tibiotalar joint.      Impression: No definite acute fracture. Questionable nondisplaced fracture of the great toe proximal phalangeal distal shaft or artifact. Soft tissue swelling. Degenerative changes.  Images personally reviewed, interpreted and dictated by GRISELDA Bettencourt.       This report was signed and finalized on 6/6/2023 1:49 PM by GRISELDA Bettencourt.    CT Head Without Contrast    Result Date: 6/6/2023  HEAD CT     6/6/2023 1:05 PM  HISTORY: Head trauma, minor (Age >= 65y)  TECHNIQUE: Multiple axial CT images were performed from the foramen magnum to the vertex. Coronal reformatted images were reconstructed from axial data set. This study was performed with techniques to keep radiation doses as low as reasonably achievable (ALARA). Individualized dose reduction techniques using automated exposure control or adjustment of mA and/or kV according to the patient size were employed. 843.84 mGy.cm  COMPARISON: None  FINDINGS: No acute intracranial hemorrhage or large acute cortical infarct. Chronic small vessel ischemic white matter changes and generalized cerebral volume loss are present. Ventricles are prominent. No midline shift. The basal cisterns are patent. Intracranial arterial atherosclerotic calcifications.  No skull fracture. Heterogeneously increased density of the bilateral  occipital and posterior parietal bones The visualized paranasal sinuses and mastoid air cells are clear.      Impression: No acute intracranial hemorrhage or large acute cortical infarct. Chronic microvascular ischemic white matter changes with global volume loss and mild ventriculomegaly. Heterogeneous appearance of the posterior skull, concerning for sclerotic osseous metastasis. Correlate clinically.   CRITICAL RESULT: No.  COMMUNICATION: Per this written report.  Images personally reviewed, interpreted, and dictated by GRISELDA Bettencourt.          This report was signed and finalized on 6/6/2023 1:40 PM by GRISELDA Bettencourt.    CT Cervical Spine Without Contrast    Result Date: 6/6/2023  CT CERVICAL SPINE     6/6/2023 1:05 PM  HISTORY: Status post fall with neck pain.Neck trauma (Age >= 65y)  COMPARISON:  None.  PROCEDURE: Axial images were obtained from the skull base to the thoracic inlet by computed tomography. 3 D reconstruction images were performed. This study was performed with techniques to keep radiation doses as low as reasonably achievable, (ALARA). Individualized dose reduction techniques using automated exposure control or adjustment of mA and/or kV according to the patient size were employed.   FINDINGS: There is no acute fracture or malalignment of the cervical spine. The facets are normally aligned. The cervical lordosis is maintained. Multilevel degenerative changes are present with disc space loss, endplate sclerosis, and marginal osteophytosis and associated mild to moderate uncovertebral and facet joint hypertrophy, and disc calcifications resulting in varying degree of mild to moderate spinal canal and neural foramina stenosis, predominantly involving C5-C6 and C6-C7. Moderate acute intra-axial joint degenerative changes.. No acute paraspinal abnormality. Limited images of the lung apices are unremarkable. There are multiple sclerotic lesions noted throughout the cervical spine and  upper thoracic spine.      Impression: No acute fracture of the cervical spine. Moderate multilevel degenerative changes.  Multiple sclerotic lesions throughout the spine, compatible with blastic osseous metastasis, correlate clinically.     Images personally reviewed, interpreted and dictated by GRISELDA Bettencourt.  This report was signed and finalized on 6/6/2023 1:35 PM by GRISELDA Bettencourt.   I have reviewed the patient's radiology reports.    Medication Review:     I have reviewed the patient's active and prn medications.     Problem List:      Pancreas malignancy    Debility    Moderate Malnutrition (HCC)      Assessment:    Generalized weakness, POA  Metastatic pancreatic cancer, POA  Recent cholangitis  Asthma without exacerbation  History of gout  Impaired mobility and ADLs    Plan:    Patient is hemodynamically stable.  Vital signs are stable replace electrolyte.  Continue with current dose of prednisone.  PT and OT.  Waiting to hear about short-term rehab.  Palliative care yesterday.  Appreciate their recommendations.    DVT Prophylaxis: Lovenox  Code Status: DNR  Diet: As tolerated  Discharge Plan: Short-term rehab    Christina Palmer DO  06/08/23  11:16 EDT    Dictated utilizing Dragon dictation.

## 2023-06-08 NOTE — CASE MANAGEMENT/SOCIAL WORK
Case Management/Social Work    Patient Name:  Nicole Garcia  YOB: 1940  MRN: [id number]  Admit Date:  6/6/2023        SABINE called and spoke with Sarai at [facility] and she stated that Pt. maria l is still pending. SABINE/MIKE will continue to follow for discharge planning.       Electronically signed by:  Jurgen Benoit  06/08/23 10:30 EDT

## 2023-06-08 NOTE — PLAN OF CARE
Goal Outcome Evaluation:  Plan of Care Reviewed With: patient        Progress: improving  Outcome Evaluation: Pt received supine in bed, reports she is feeling much better today.  Pt able to sit eob with supervision, stood with cga and walked to the bathroom with cga using RW.  Pt able to perform toileting tasks with supervision.  Pt stood at sink to perform grooming tasks, able to stand ~10 min without fatigue.  Pt walked to her chair with RW and was left sitting up with call light within reach.  Cont OT per POC.      Evaluation Complexity (OT)  Review Occupational Profile/Medical/Therapy History Complexity: expanded/moderate complexity  Assessment, Occupational Performance/Identification of Deficit Complexity: 3-5 performance deficits  Clinical Decision Making Complexity (OT): detailed assessment/moderate complexity  Overall Complexity of Evaluation (OT): moderate complexity

## 2023-06-08 NOTE — PLAN OF CARE
Goal Outcome Evaluation:  Plan of Care Reviewed With: patient        Progress: improving     VSS. Nontele. Pt maintaining o2 >90% on RA. Medications administered per orders/MAR. Magnesium replaced during shift per orders. Discharge is pending.        Problem: Adult Inpatient Plan of Care  Goal: Plan of Care Review  Outcome: Ongoing, Progressing  Flowsheets (Taken 6/8/2023 1726)  Progress: improving  Plan of Care Reviewed With: patient  Goal: Patient-Specific Goal (Individualized)  Outcome: Ongoing, Progressing  Goal: Absence of Hospital-Acquired Illness or Injury  Outcome: Ongoing, Progressing  Intervention: Identify and Manage Fall Risk  Recent Flowsheet Documentation  Taken 6/8/2023 1613 by Marlyn Gu RN  Safety Promotion/Fall Prevention:   activity supervised   assistive device/personal items within reach   clutter free environment maintained   toileting scheduled   safety round/check completed   room organization consistent   nonskid shoes/slippers when out of bed   lighting adjusted   fall prevention program maintained  Taken 6/8/2023 1331 by Marlyn Gu, STONEY  Safety Promotion/Fall Prevention:   activity supervised   clutter free environment maintained   assistive device/personal items within reach   toileting scheduled   safety round/check completed   room organization consistent   nonskid shoes/slippers when out of bed   lighting adjusted   fall prevention program maintained  Taken 6/8/2023 1230 by Marlyn Gu, STONEY  Safety Promotion/Fall Prevention:   activity supervised   assistive device/personal items within reach   clutter free environment maintained   toileting scheduled   safety round/check completed   room organization consistent   lighting adjusted   fall prevention program maintained  Taken 6/8/2023 0939 by Marlyn Gu, RN  Safety Promotion/Fall Prevention:   activity supervised   assistive device/personal items within reach   clutter free environment maintained   toileting scheduled   safety  round/check completed   room organization consistent   lighting adjusted   fall prevention program maintained  Taken 6/8/2023 0845 by Marlyn Gu RN  Safety Promotion/Fall Prevention:   activity supervised   assistive device/personal items within reach   clutter free environment maintained   toileting scheduled   safety round/check completed   room organization consistent   nonskid shoes/slippers when out of bed   lighting adjusted   fall prevention program maintained  Intervention: Prevent Skin Injury  Recent Flowsheet Documentation  Taken 6/8/2023 1331 by Marlyn Gu RN  Body Position: legs elevated  Skin Protection:   adhesive use limited   tubing/devices free from skin contact   incontinence pads utilized  Taken 6/8/2023 1230 by Marlyn Gu RN  Body Position: legs elevated  Taken 6/8/2023 0939 by Marlyn Gu RN  Skin Protection:   adhesive use limited   incontinence pads utilized   tubing/devices free from skin contact  Taken 6/8/2023 0845 by Marlyn Gu RN  Body Position:   position changed independently   sitting up in bed  Intervention: Prevent and Manage VTE (Venous Thromboembolism) Risk  Recent Flowsheet Documentation  Taken 6/8/2023 1613 by Marlyn Gu RN  Activity Management: up in chair  Taken 6/8/2023 1331 by Marlyn Gu RN  Activity Management: ambulated to bathroom  Taken 6/8/2023 0939 by Marlyn Gu RN  Activity Management: activity encouraged  Taken 6/8/2023 0845 by Marlyn Gu RN  Activity Management: activity encouraged  VTE Prevention/Management: (See MAR) other (see comments)  Range of Motion: active ROM (range of motion) encouraged  Intervention: Prevent Infection  Recent Flowsheet Documentation  Taken 6/8/2023 1613 by Marlyn Gu RN  Infection Prevention:   single patient room provided   rest/sleep promoted   hand hygiene promoted   equipment surfaces disinfected   environmental surveillance performed  Taken 6/8/2023 1331 by Marlyn Gu RN  Infection  Prevention:   rest/sleep promoted   single patient room provided   hand hygiene promoted   equipment surfaces disinfected   environmental surveillance performed  Taken 6/8/2023 1230 by Marlyn Gu RN  Infection Prevention:   single patient room provided   rest/sleep promoted   hand hygiene promoted   equipment surfaces disinfected   environmental surveillance performed  Taken 6/8/2023 0939 by Marlyn Gu RN  Infection Prevention:   single patient room provided   rest/sleep promoted   hand hygiene promoted   equipment surfaces disinfected   environmental surveillance performed  Taken 6/8/2023 0845 by Marlyn Gu RN  Infection Prevention:   single patient room provided   rest/sleep promoted   hand hygiene promoted   environmental surveillance performed   equipment surfaces disinfected  Goal: Optimal Comfort and Wellbeing  Outcome: Ongoing, Progressing  Intervention: Monitor Pain and Promote Comfort  Recent Flowsheet Documentation  Taken 6/8/2023 1613 by Marlyn Gu RN  Pain Management Interventions:   position adjusted   pillow support provided  Taken 6/8/2023 1230 by Marlyn Gu RN  Pain Management Interventions:   position adjusted   pillow support provided  Taken 6/8/2023 0845 by Marlyn Gu RN  Pain Management Interventions:   pillow support provided   position adjusted  Intervention: Provide Person-Centered Care  Recent Flowsheet Documentation  Taken 6/8/2023 0845 by Marlyn Gu RN  Trust Relationship/Rapport:   care explained   choices provided   emotional support provided   questions answered   empathic listening provided   questions encouraged   reassurance provided   thoughts/feelings acknowledged  Goal: Readiness for Transition of Care  Outcome: Ongoing, Progressing     Problem: Fall Injury Risk  Goal: Absence of Fall and Fall-Related Injury  Outcome: Ongoing, Progressing  Intervention: Identify and Manage Contributors  Recent Flowsheet Documentation  Taken 6/8/2023 1613 by Riccardo  STONEY Cline  Medication Review/Management: medications reviewed  Self-Care Promotion:   independence encouraged   BADL personal routines maintained   BADL personal objects within reach   meal set-up provided  Taken 6/8/2023 1331 by Marlyn Gu RN  Medication Review/Management: medications reviewed  Taken 6/8/2023 1230 by Marlyn Gu RN  Medication Review/Management: medications reviewed  Self-Care Promotion:   independence encouraged   BADL personal objects within reach   BADL personal routines maintained   meal set-up provided  Taken 6/8/2023 0939 by Marlyn Gu RN  Medication Review/Management: medications reviewed  Taken 6/8/2023 0845 by Marlyn Gu RN  Medication Review/Management: medications reviewed  Self-Care Promotion:   independence encouraged   BADL personal objects within reach   meal set-up provided   BADL personal routines maintained  Intervention: Promote Injury-Free Environment  Recent Flowsheet Documentation  Taken 6/8/2023 1613 by Marlyn Gu RN  Safety Promotion/Fall Prevention:   activity supervised   assistive device/personal items within reach   clutter free environment maintained   toileting scheduled   safety round/check completed   room organization consistent   nonskid shoes/slippers when out of bed   lighting adjusted   fall prevention program maintained  Taken 6/8/2023 1331 by Marlyn Gu RN  Safety Promotion/Fall Prevention:   activity supervised   clutter free environment maintained   assistive device/personal items within reach   toileting scheduled   safety round/check completed   room organization consistent   nonskid shoes/slippers when out of bed   lighting adjusted   fall prevention program maintained  Taken 6/8/2023 1230 by Marlyn Gu RN  Safety Promotion/Fall Prevention:   activity supervised   assistive device/personal items within reach   clutter free environment maintained   toileting scheduled   safety round/check completed   room organization  consistent   lighting adjusted   fall prevention program maintained  Taken 6/8/2023 0939 by Marlyn Gu, RN  Safety Promotion/Fall Prevention:   activity supervised   assistive device/personal items within reach   clutter free environment maintained   toileting scheduled   safety round/check completed   room organization consistent   lighting adjusted   fall prevention program maintained  Taken 6/8/2023 0845 by Marlyn Gu, RN  Safety Promotion/Fall Prevention:   activity supervised   assistive device/personal items within reach   clutter free environment maintained   toileting scheduled   safety round/check completed   room organization consistent   nonskid shoes/slippers when out of bed   lighting adjusted   fall prevention program maintained

## 2023-06-08 NOTE — THERAPY TREATMENT NOTE
Patient Name: Nicole Garcia  : 1940    MRN: [id number]                              Today's Date: 2023       Admit Date: 2023    Visit Dx:     ICD-10-CM ICD-9-CM   1. Weakness  R53.1 780.79     Patient Active Problem List   Diagnosis    Allergic rhinitis due to pollen    Bilateral sensorineural hearing loss    Impacted cerumen    Otitis externa of left ear    Hypertension    Painless jaundice    Asthma    Hypokalemia    Elevated liver enzymes    Acute UTI    Hyponatremia    Pancreatic mass    Hyperbilirubinemia    Elevated CA 19-9 level    Biliary obstruction due to cancer    Pancreas malignancy    Debility    Moderate Malnutrition (HCC)     Past Medical History:   Diagnosis Date    Arthritis     Asthma     Hypertension      Past Surgical History:   Procedure Laterality Date    APPENDECTOMY      BREAST SURGERY      cyst removed    ERCP N/A 2023    Procedure: ENDOSCOPIC RETROGRADE CHOLANGIOPANCREATOGRAPHY WITH STENT;  Surgeon: Darius Obrien MD;  Location: [facility] ENDOSCOPY;  Service: Gastroenterology;  Laterality: N/A;  Sphincterotomy made. Common bile duct (CBD) bushed for non-gyne cyto. Wall stent 10x 60 placed in CBD.     SKIN CANCER EXCISION      TONSILLECTOMY      TUBAL ABDOMINAL LIGATION        General Information       Row Name 23 1245          OT Time and Intention    Document Type therapy note (daily note)  -     Mode of Treatment occupational therapy  -       Row Name 23 1245          General Information    Patient Profile Reviewed yes  -     Existing Precautions/Restrictions fall  -               User Key  (r) = Recorded By, (t) = Taken By, (c) = Cosigned By      Initials Name Provider Type     Dee Arzola Occupational Therapist                     Mobility/ADL's       Row Name 23 1245          Bed Mobility    Bed Mobility supine-sit  -     Supine-Sit [place] (Bed Mobility) contact guard  -     Assistive Device (Bed Mobility) head of bed  elevated;bed rails  -[facility] Name 06/08/23 1245          Sit-Stand Transfer    Sit-Stand [place] (Transfers) contact guard  -     Assistive Device (Sit-Stand Transfers) walker, front-wheeled  -[facility] Name 06/08/23 1245          Functional Mobility    Functional Mobility- Ind. Level contact guard assist  -     Functional Mobility- Device walker, front-wheeled  -     Functional Mobility-Distance (Feet) 24  -     Functional Mobility- Comment Pt walked to the bathroom 16' and back to her chair 24'  -[facility] Name 06/08/23 1245          Grooming Assessment/Training    [place] Level (Grooming) oral care regimen;wash face, hands;standby assist  -     Position (Grooming) sink side;unsupported standing  -[facility] Name 06/08/23 1245          Toileting Assessment/Training    [place] Level (Toileting) adjust/manage clothing;perform perineal hygiene;supervision  -               User Key  (r) = Recorded By, (t) = Taken By, (c) = Cosigned By      Initials Name Provider Type     Dee Arzola Occupational Therapist                   Obj/Interventions    No documentation.                  Goals/Plan    No documentation.                  Clinical Impression       Row Name 06/08/23 1252          Pain Assessment    Pretreatment Pain Rating 8/10  -     Posttreatment Pain Rating 8/10  -     Pain Location - Side/Orientation Right  -     Pain Location - wrist  -     Pain Intervention(s) Repositioned;Ambulation/increased activity  -AH       Row Name 06/08/23 1252          Plan of Care Review    Plan of Care Reviewed With patient  -     Progress improving  -     Outcome Evaluation Pt received supine in bed, reports she is feeling much better today.  Pt able to sit eob with supervision, stood with cga and walked to the bathroom with cga using RW.  Pt able to perform toileting tasks with supervision.  Pt stood at sink to perform grooming tasks, able to stand ~10 min without fatigue.   Pt walked to her chair with RW and was left sitting up with call light within reach.  Cont OT per POC.  -AH       Row Name 06/08/23 1252          Positioning and Restraints    Pre-Treatment Position in bed  -AH     Post Treatment Position chair  -AH     In Chair sitting;call light within reach;encouraged to call for assist;exit alarm on  -AH               User Key  (r) = Recorded By, (t) = Taken By, (c) = Cosigned By      Initials Name Provider Type    Dee Christianson Occupational Therapist                   Outcome Measures       Row Name 06/08/23 1259          How much help from another is currently needed...    Putting on and taking off regular lower body clothing? 3  -AH     Bathing (including washing, rinsing, and drying) 2  -AH     Toileting (which includes using toilet bed pan or urinal) 3  -AH     Putting on and taking off regular upper body clothing 3  -AH     Taking care of personal grooming (such as brushing teeth) 4  -AH     Eating meals 4  -AH     AM-PAC 6 Clicks Score (OT) 19  -AH       Row Name 06/08/23 0845          How much help from another person do you currently need...    Turning from your back to your side while in flat bed without using bedrails? 3  -KJ     Moving from lying on back to sitting on the side of a flat bed without bedrails? 3  -KJ     Moving to and from a bed to a chair (including a wheelchair)? 3  -KJ     Standing up from a chair using your arms (e.g., wheelchair, bedside chair)? 3  -KJ     Climbing 3-5 steps with a railing? 2  -KJ     To walk in hospital room? 2  -KJ     AM-PAC 6 Clicks Score (PT) 16  -KJ     Highest level of mobility 5 --> Static standing  -KJ               User Key  (r) = Recorded By, (t) = Taken By, (c) = Cosigned By      Initials Name Provider Type    Dee Christianson Occupational Therapist    Marlyn Mackenzie, RN Registered Nurse                    Occupational Therapy Education       Title: PT OT SLP Therapies (In Progress)       Topic: Occupational  Therapy (In Progress)       Point: ADL training (Done)       Description:   Instruct learner(s) on proper safety adaptation and remediation techniques during self care or transfers.   Instruct in proper use of assistive devices.                  Learning Progress Summary             Patient Acceptance, E,TB, VU by  at 6/8/2023 1300    Comment: benefit of working with therapy    Acceptance, E,TB, VU by  at 6/7/2023 1331    Comment: Role of OT/POC                         Point: Home exercise program (Not Started)       Description:   Instruct learner(s) on appropriate technique for monitoring, assisting and/or progressing therapeutic exercises/activities.                  Learner Progress:  Not documented in this visit.              Point: Precautions (Not Started)       Description:   Instruct learner(s) on prescribed precautions during self-care and functional transfers.                  Learner Progress:  Not documented in this visit.              Point: Body mechanics (Not Started)       Description:   Instruct learner(s) on proper positioning and spine alignment during self-care, functional mobility activities and/or exercises.                  Learner Progress:  Not documented in this visit.                              User Key       Initials Effective Dates Name Provider Type Discipline     06/16/21 -  Dee Arzola Occupational Therapist OT                  OT Recommendation and Plan  Planned Therapy Interventions (OT): activity tolerance training, BADL retraining, patient/caregiver education/training, transfer/mobility retraining, strengthening exercise  Therapy Frequency (OT): 3 times/wk  Plan of Care Review  Plan of Care Reviewed With: patient  Progress: improving  Outcome Evaluation: Pt received supine in bed, reports she is feeling much better today.  Pt able to sit eob with supervision, stood with cga and walked to the bathroom with cga using RW.  Pt able to perform toileting tasks with  supervision.  Pt stood at sink to perform grooming tasks, able to stand ~10 min without fatigue.  Pt walked to her chair with RW and was left sitting up with call light within reach.  Cont OT per POC.     Time Calculation:    Time Calculation- OT       Row Name 06/08/23 1300             Time Calculation- OT    OT Start Time 1022  -      OT Stop Time 1050  -      OT Time Calculation (min) 28 min  -      OT Received On 06/08/23  -      OT Goal Re-Cert Due Date 06/17/23  -         Timed Charges    30323 - OT Therapeutic Activity Minutes 8  -      29478 - OT Self Care/Mgmt Minutes 20  -         Total Minutes    Timed Charges Total Minutes 28  -       Total Minutes 28  -AH                User Key  (r) = Recorded By, (t) = Taken By, (c) = Cosigned By      Initials Name Provider Type    Dee Christianson Occupational Therapist                  Therapy Charges for Today       Code Description Service Date Service Provider Modifiers Qty    55016761927 HC OT EVAL MOD COMPLEXITY 3 6/7/2023 Dee Arzola 1    88437269516 HC OT THERAPEUTIC ACT EA 15 MIN 6/8/2023 Dee Arzola 1    64258900380 HC OT SELF CARE/MGMT/TRAIN EA 15 MIN 6/8/2023 Dee Arzola 1                 Dee Arzola  6/8/2023

## 2023-06-08 NOTE — THERAPY TREATMENT NOTE
Patient Name: Nicole Garcia  : 1940    MRN: [id number]                              Today's Date: 2023       Admit Date: 2023    Visit Dx:     ICD-10-CM ICD-9-CM   1. Weakness  R53.1 780.79     Patient Active Problem List   Diagnosis    Allergic rhinitis due to pollen    Bilateral sensorineural hearing loss    Impacted cerumen    Otitis externa of left ear    Hypertension    Painless jaundice    Asthma    Hypokalemia    Elevated liver enzymes    Acute UTI    Hyponatremia    Pancreatic mass    Hyperbilirubinemia    Elevated CA 19-9 level    Biliary obstruction due to cancer    Pancreas malignancy    Debility    Moderate Malnutrition (HCC)     Past Medical History:   Diagnosis Date    Arthritis     Asthma     Hypertension      Past Surgical History:   Procedure Laterality Date    APPENDECTOMY      BREAST SURGERY      cyst removed    ERCP N/A 2023    Procedure: ENDOSCOPIC RETROGRADE CHOLANGIOPANCREATOGRAPHY WITH STENT;  Surgeon: Darius Obrien MD;  Location: [facility] ENDOSCOPY;  Service: Gastroenterology;  Laterality: N/A;  Sphincterotomy made. Common bile duct (CBD) bushed for non-gyne cyto. Wall stent 10x 60 placed in CBD.     SKIN CANCER EXCISION      TONSILLECTOMY      TUBAL ABDOMINAL LIGATION        General Information       Row Name 23 1254          Physical Therapy Time and Intention    Document Type therapy note (daily note)  -RM     Mode of Treatment physical therapy  -RM       Row Name 23 1254          General Information    Patient Profile Reviewed yes  -RM     Existing Precautions/Restrictions fall  -RM       Row Name 23 1254          Cognition    Orientation Status (Cognition) oriented x 4  -RM       Row Name 23 1254          Safety Issues, Functional Mobility    Safety Issues Affecting Function (Mobility) safety precautions follow-through/compliance;safety precaution awareness  posture  -RM     Impairments Affecting Function (Mobility) balance;endurance/activity  tolerance;strength;pain  -RM               User Key  (r) = Recorded By, (t) = Taken By, (c) = Cosigned By      Initials Name Provider Type    Kali Mei PTA Physical Therapist Assistant                   Mobility       Row Name 06/08/23 1253          Bed Mobility    Comment, (Bed Mobility) Pt sitting uic  -RM       Row Name 06/08/23 1255          Sit-Stand Transfer    Sit-Stand [place] (Transfers) standby assist;verbal cues  -RM     Assistive Device (Sit-Stand Transfers) other (see comments)  gait belt  -RM       Row Name 06/08/23 1255          Gait/Stairs (Locomotion)    [place] Level (Gait) contact guard;minimum assist (75% patient effort);verbal cues  -RM     Assistive Device (Gait) other (see comments)  gait belt  -RM     Distance in Feet (Gait) 40' x 2 with standing rest and one episode of LOB  -RM     Deviations/Abnormal Patterns (Gait) stride length decreased;base of support, narrow  -RM     Right Sided Gait Deviations weight shift ability decreased  -RM               User Key  (r) = Recorded By, (t) = Taken By, (c) = Cosigned By      Initials Name Provider Type    Kali Mei, MARIFER Physical Therapist Assistant                   Obj/Interventions    No documentation.                  Goals/Plan    No documentation.                  Clinical Impression       Row Name 06/08/23 1258          Pain    Pretreatment Pain Rating 9/10  -RM     Posttreatment Pain Rating 7/10  -RM     Pain Location - Side/Orientation Right  -RM     Pain Location - wrist  -RM     Pre/Posttreatment Pain Comment R LE 3/10 and 1/10 post  -RM       Row Name 06/08/23 125          Plan of Care Review    Plan of Care Reviewed With patient  -RM     Progress improving  -RM     Outcome Evaluation Pt  sitting uic and willing to participate with treatment. Pt transfered to stand from bedside recliner with sba.  Pt advanced gait distance to 40' x 2 cga/min a. Pt reported fatigue post gait. Pt was able to perform gait  without use of AAD but did have one episode of LOB. Pt began gait training with significant head foreward position.  Pt was able to correct with vc's and had improved balance once COG was positioned more over hips.  See flowsheet for details  -RM       Row Name 06/08/23 1258          Positioning and Restraints    Pre-Treatment Position sitting in chair/recliner  -RM     Post Treatment Position chair  -RM     In Chair reclined;call light within reach;encouraged to call for assist;exit alarm on;notified nsg  -RM               User Key  (r) = Recorded By, (t) = Taken By, (c) = Cosigned By      Initials Name Provider Type    Kali Mei, PTA Physical Therapist Assistant                   Outcome Measures       Row Name 06/08/23 1303 06/08/23 0845       How much help from another person do you currently need...    Turning from your back to your side while in flat bed without using bedrails? 3  -RM 3  -KJ    Moving from lying on back to sitting on the side of a flat bed without bedrails? 3  -RM 3  -KJ    Moving to and from a bed to a chair (including a wheelchair)? 3  -RM 3  -KJ    Standing up from a chair using your arms (e.g., wheelchair, bedside chair)? 4  -RM 3  -KJ    Climbing 3-5 steps with a railing? 2  -RM 2  -KJ    To walk in hospital room? 3  -RM 2  -KJ    AM-PAC 6 Clicks Score (PT) 18  -RM 16  -KJ    Highest level of mobility 6 --> Walked 10 steps or more  -RM 5 --> Static standing  -KJ      Row Name 06/08/23 1303          Functional Assessment    Outcome Measure Options AM-PAC 6 Clicks Basic Mobility (PT)  -RM               User Key  (r) = Recorded By, (t) = Taken By, (c) = Cosigned By      Initials Name Provider Type    Kali Mei, PTA Physical Therapist Assistant    Marlyn Mackenzie, RN Registered Nurse                                 Physical Therapy Education       Title: PT OT SLP Therapies (In Progress)       Topic: Physical Therapy (In Progress)       Point: Mobility training (Done)        Learning Progress Summary             Patient Acceptance, E,TB,D, VU,NR by RM at 6/8/2023 1303    Comment: posture and gait speed during mobility    Acceptance, E, VU by MS at 6/7/2023 1209    Comment: importance of mobility                         Point: Home exercise program (Done)       Learning Progress Summary             Patient Acceptance, E, VU by MS at 6/7/2023 1209    Comment: importance of mobility                         Point: Body mechanics (Not Started)       Learner Progress:  Not documented in this visit.              Point: Precautions (Not Started)       Learner Progress:  Not documented in this visit.                              User Key       Initials Effective Dates Name Provider Type Discipline     06/16/21 -  Kail Olivarez, PTA Physical Therapist Assistant PT    MS 07/01/22 -  Checo De Oliveira, HINA Physical Therapist PT                  PT Recommendation and Plan     Plan of Care Reviewed With: patient  Progress: improving  Outcome Evaluation: Pt  sitting uic and willing to participate with treatment. Pt transfered to stand from bedside recliner with sba.  Pt advanced gait distance to 40' x 2 cga/min a. Pt reported fatigue post gait. Pt was able to perform gait without use of AAD but did have one episode of LOB. Pt began gait training with significant head foreward position.  Pt was able to correct with vc's and had improved balance once COG was positioned more over hips.  See flowsheet for details     Time Calculation:    PT Charges       Row Name 06/08/23 1304             Time Calculation    Start Time 1100  -RM      Stop Time 1126  -RM      Time Calculation (min) 26 min  -RM      PT Received On 06/08/23  -RM      PT Goal Re-Cert Due Date 06/17/23  -RM         Time Calculation- PT    Total Timed Code Minutes- PT 26 minute(s)  -RM         Timed Charges    50002 - Gait Training Minutes  16  -RM      24888 - PT Therapeutic Activity Minutes 10  -RM         Total Minutes    Timed  Charges Total Minutes 26  -RM       Total Minutes 26  -RM                User Key  (r) = Recorded By, (t) = Taken By, (c) = Cosigned By      Initials Name Provider Type    Kali Mei, MARIFER Physical Therapist Assistant                  Therapy Charges for Today       Code Description Service Date Service Provider Modifiers Qty    96125404193 HC GAIT TRAINING EA 15 MIN 6/8/2023 Kali Olivarez, PTA GP 1    87577977744 HC PT THERAPEUTIC ACT EA 15 MIN 6/8/2023 Kali Olivarez, PTA GP 1            PT G-Codes  Outcome Measure Options: AM-PAC 6 Clicks Basic Mobility (PT)  AM-PAC 6 Clicks Score (PT): 18  AM-PAC 6 Clicks Score (OT): 19       Kali Olivarez PTA  6/8/2023

## 2023-06-08 NOTE — PLAN OF CARE
Goal Outcome Evaluation:  Plan of Care Reviewed With: patient        Progress: improving  Outcome Evaluation: Pt  sitting uic and willing to participate with treatment. Pt transfered to stand from bedside recliner with sba.  Pt advanced gait distance to 40' x 2 cga/min a. Pt reported fatigue post gait. Pt was able to perform gait without use of AAD but did have one episode of LOB. Pt began gait training with significant head foreward position.  Pt was able to correct with vc's and had improved balance once COG was positioned more over hips.  See flowsheet for details

## 2023-06-09 ENCOUNTER — TELEPHONE (OUTPATIENT)
Dept: INTERNAL MEDICINE | Facility: CLINIC | Age: 83
End: 2023-06-09
Payer: MEDICARE

## 2023-06-09 VITALS
TEMPERATURE: 98 F | BODY MASS INDEX: 21.87 KG/M2 | SYSTOLIC BLOOD PRESSURE: 140 MMHG | WEIGHT: 118.83 LBS | OXYGEN SATURATION: 95 % | HEIGHT: 62 IN | HEART RATE: 80 BPM | DIASTOLIC BLOOD PRESSURE: 71 MMHG | RESPIRATION RATE: 18 BRPM

## 2023-06-09 LAB
ANION GAP SERPL CALCULATED.3IONS-SCNC: 10.6 MMOL/L (ref 5–15)
BILIRUB UR QL STRIP: NEGATIVE
BUN SERPL-MCNC: 11 MG/DL (ref 8–23)
BUN/CREAT SERPL: 18.6 (ref 7–25)
CALCIUM SPEC-SCNC: 8.7 MG/DL (ref 8.6–10.5)
CHLORIDE SERPL-SCNC: 103 MMOL/L (ref 98–107)
CLARITY UR: CLEAR
CO2 SERPL-SCNC: 22.4 MMOL/L (ref 22–29)
COLOR UR: YELLOW
CREAT SERPL-MCNC: 0.59 MG/DL (ref 0.57–1)
EGFRCR SERPLBLD CKD-EPI 2021: 89 ML/MIN/1.73
GLUCOSE SERPL-MCNC: 126 MG/DL (ref 65–99)
GLUCOSE UR STRIP-MCNC: ABNORMAL MG/DL
HGB UR QL STRIP.AUTO: NEGATIVE
KETONES UR QL STRIP: NEGATIVE
LEUKOCYTE ESTERASE UR QL STRIP.AUTO: NEGATIVE
MAGNESIUM SERPL-MCNC: 2.1 MG/DL (ref 1.6–2.4)
NITRITE UR QL STRIP: NEGATIVE
PH UR STRIP.AUTO: <=5 [PH] (ref 5–8)
POTASSIUM SERPL-SCNC: 3.8 MMOL/L (ref 3.5–5.2)
PROT UR QL STRIP: NEGATIVE
SARS-COV-2 RNA RESP QL NAA+PROBE: NOT DETECTED
SODIUM SERPL-SCNC: 136 MMOL/L (ref 136–145)
SP GR UR STRIP: 1.01 (ref 1–1.03)
UROBILINOGEN UR QL STRIP: ABNORMAL

## 2023-06-09 PROCEDURE — G0378 HOSPITAL OBSERVATION PER HR: HCPCS

## 2023-06-09 PROCEDURE — 83735 ASSAY OF MAGNESIUM: CPT | Performed by: FAMILY MEDICINE

## 2023-06-09 PROCEDURE — 25010000002 ENOXAPARIN PER 10 MG: Performed by: FAMILY MEDICINE

## 2023-06-09 PROCEDURE — 96372 THER/PROPH/DIAG INJ SC/IM: CPT

## 2023-06-09 PROCEDURE — 87635 SARS-COV-2 COVID-19 AMP PRB: CPT | Performed by: FAMILY MEDICINE

## 2023-06-09 PROCEDURE — 81003 URINALYSIS AUTO W/O SCOPE: CPT | Performed by: FAMILY MEDICINE

## 2023-06-09 PROCEDURE — 63710000001 PREDNISONE PER 1 MG: Performed by: FAMILY MEDICINE

## 2023-06-09 PROCEDURE — 99238 HOSP IP/OBS DSCHRG MGMT 30/<: CPT | Performed by: FAMILY MEDICINE

## 2023-06-09 PROCEDURE — 80048 BASIC METABOLIC PNL TOTAL CA: CPT | Performed by: FAMILY MEDICINE

## 2023-06-09 RX ORDER — TRAMADOL HYDROCHLORIDE 50 MG/1
50 TABLET ORAL EVERY 8 HOURS PRN
Qty: 9 TABLET | Refills: 0 | Status: SHIPPED | OUTPATIENT
Start: 2023-06-09 | End: 2023-06-12

## 2023-06-09 RX ORDER — MAGNESIUM OXIDE 400 MG/1
400 TABLET ORAL DAILY
Start: 2023-06-09

## 2023-06-09 RX ORDER — PREDNISONE 10 MG/1
10 TABLET ORAL DAILY
Qty: 3 TABLET | Refills: 0
Start: 2023-06-13 | End: 2023-06-16

## 2023-06-09 RX ORDER — PREDNISONE 20 MG/1
20 TABLET ORAL DAILY
Qty: 3 TABLET | Refills: 0
Start: 2023-06-10 | End: 2023-06-13

## 2023-06-09 RX ORDER — ACETAMINOPHEN 325 MG/1
650 TABLET ORAL EVERY 4 HOURS PRN
Start: 2023-06-09

## 2023-06-09 RX ADMIN — PANTOPRAZOLE SODIUM 40 MG: 40 TABLET, DELAYED RELEASE ORAL at 05:20

## 2023-06-09 RX ADMIN — ENOXAPARIN SODIUM 30 MG: 30 INJECTION SUBCUTANEOUS at 08:27

## 2023-06-09 RX ADMIN — SENNOSIDES AND DOCUSATE SODIUM 2 TABLET: 50; 8.6 TABLET ORAL at 08:28

## 2023-06-09 RX ADMIN — Medication 10 ML: at 08:31

## 2023-06-09 RX ADMIN — PREDNISONE 40 MG: 20 TABLET ORAL at 08:28

## 2023-06-09 NOTE — DISCHARGE SUMMARY
[facility] [place]   DISCHARGE SUMMARY      Name:  Nicole Garcia   Age:  82 y.o.  Sex:  female  :  1940  MRN:  [id number]   Visit Number:  [id number]    Admission Date:  2023  Date of Discharge:  2023  Primary Care Physician:  Zaida Marrero APRN    Important issues to note:    Discharge to [facility]  F/u with palliative care  F/u with [facility]    Discharge Diagnoses:     Generalized weakness, POA  Metastatic pancreatic cancer, POA  Recent cholangitis  Asthma without exacerbation  History of gout  Impaired mobility and ADLs    Problem List:     Active Hospital Problems    Diagnosis  POA    **Pancreas malignancy [C25.9]  Yes    Moderate Malnutrition (HCC) [E44.0]  Yes    Debility [R53.81]  Yes      Resolved Hospital Problems   No resolved problems to display.     Presenting Problem:    Chief Complaint   Patient presents with    Weakness - Generalized    Fall     Family states pt fell last night, pt denies falling last night, fell . Pt has right wrist pain, swelling to the right foot. Pt states she has hx of gout. Pt recent dx of pancreatic CA with treatments starting next Tuesday. Pt was in  for a week released a week ago. Unsure why she was in.       Consults:     Consulting Physician(s)               None            Procedures Performed:        History of presenting illness/Hospital Course:    Patient is a 82-year-old female with history of metastatic pancreatic cancer followed by [facility], had recent hospitalization and discharge due to acute cholangeitis and was discharged on 29 May.  She has underlying history of asthma and hypertension in addition to gout.  She is retired teacher.  She presented to the emergency room with complaints of weakness, generalized pain, thought that she may have had a gout flare.     In the ER, she was hemodynamically stable and labs were overall stable.  She had imaging performed.  A CT scan of the head  showed no hemorrhage or infarct, there were is chronic microvascular changes as well as heterogeneous appearance of the posterior skull concerning for sclerotic osseous metastasis.  CT scan of cervical spine with multiple sclerotic appearing lesions throughout the spine compatible with blastic osseous metastasis.  X-rays of the right wrist the right forearm with no fractures and degenerative changes.  X-ray of the right knee with no acute fracture.  X-ray of the right foot with questionable nondisplaced fracture of the great toe proximal phalangeal distal shaft or artifact noted.  Due to her significant weakness and debility we are asked to admit.     Patient was updated on her findings from her prior PET scan as well as CT scans performed this hospitalization.  Patient is willing to proceed with short-term rehab, has been approved for STR. She was started on a prednisone taper for possible gout flare and to help with bone pain. Has been working with PT/OT. Has been arranged for placement today. Palliative care will follow up with her moving forward. Can f/u with [facility] if decides on moving forward with chemo, but patient doesn't seem to keen on idea.     Vital Signs:    Temp:  [97.8 °F (36.6 °C)-98.1 °F (36.7 °C)] 98 °F (36.7 °C)  Heart Rate:  [70-82] 80  Resp:  [16-18] 18  BP: (126-150)/(63-72) 140/71    Physical Exam:    General Appearance:  Alert and cooperative.    Head:  Atraumatic and normocephalic.   Eyes: Conjunctivae and sclerae normal, no icterus. No pallor.   Ears:  Ears with no abnormalities noted.   Throat: No oral lesions, no thrush, oral mucosa moist.   Neck: Supple, trachea midline, no thyromegaly.   Back:   No kyphoscoliosis present. No tenderness to palpation.   Lungs:   Breath sounds heard bilaterally equally.  No crackles or wheezing. No Pleural rub or bronchial breathing.   Heart:  Normal S1 and S2, no murmur, no gallop, no rub. No JVD.   Abdomen:   Normal bowel sounds, no masses, no  organomegaly. Soft, nontender, nondistended, no rebound tenderness.   Extremities: Supple, no edema, no cyanosis, no clubbing.   Pulses: Pulses palpable bilaterally.   Skin: No bleeding or rash.   Neurologic: Alert and oriented x 3. No facial asymmetry. Moves all four limbs. No tremors.     Pertinent Lab Results:     Results from last 7 days   Lab Units 06/09/23  0819 06/07/23  0730 06/06/23  1622   SODIUM mmol/L 136 132* 137   POTASSIUM mmol/L 3.8 3.9 3.4*   CHLORIDE mmol/L 103 98 97*   CO2 mmol/L 22.4 22.7 26.0   BUN mg/dL 11 11 13   CREATININE mg/dL 0.59 0.56* 0.59   CALCIUM mg/dL 8.7 8.9 9.2   BILIRUBIN mg/dL  --   --  1.1   ALK PHOS U/L  --   --  349*   ALT (SGPT) U/L  --   --  14   AST (SGOT) U/L  --   --  20   GLUCOSE mg/dL 126* 123* 123*     Results from last 7 days   Lab Units 06/07/23  0730 06/06/23  1622   WBC 10*3/mm3 8.90 11.18*   HEMOGLOBIN g/dL 10.1* 11.3*   HEMATOCRIT % 30.2* 34.1   PLATELETS 10*3/mm3 189 208                                   Pertinent Radiology Results:    Imaging Results (All)       Procedure Component Value Units Date/Time    XR Knee 3 View Right [[id number]] Collected: 06/06/23 1349     Updated: 06/06/23 1354    Narrative:      CLINICAL INDICATION:    fall     EXAMINATION TECHNIQUE:   XR KNEE 3 VW RIGHT-     COMPARISON:  None.     FINDINGS:  No acute fracture or malalignment. Moderate tricompartmental  osteoarthrosis. Extensive chondrocalcinosis. Medial tilt of the joint  line with a depressed medial tibial condyle, chronic. No suprapatellar  joint effusion. No acute soft tissue abnormality. No radiodense foreign  bodies.       Impression:      No acute osseous findings. Advanced degenerative changes.  Chondrocalcinosis.     Images personally reviewed, interpreted and dictated by GRISELDA Bettencourt.                This report was signed and finalized on 6/6/2023 1:52 PM by GRISELDA Bettencourt.    XR Foot 3+ View Right [[id number]] Collected: 06/06/23 1346     Updated:  06/06/23 1351    Narrative:      CLINICAL INDICATION:    trauma     EXAMINATION TECHNIQUE:   XR FOOT 3+ VW RIGHT-     COMPARISON:  None.     FINDINGS:  Questionable nondisplaced fractures of the great toe proximal phalangeal  distal shaft or artifact. Mild soft tissue swelling of the medial aspect  of the forefoot. Mild degenerative changes of the first MTP joint.  Osseous demineralization. Degenerative changes of the tibiotalar joint.       Impression:      No definite acute fracture. Questionable nondisplaced fracture of the  great toe proximal phalangeal distal shaft or artifact. Soft tissue  swelling. Degenerative changes.     Images personally reviewed, interpreted and dictated by GRISELDA Bettencourt.                This report was signed and finalized on 6/6/2023 1:49 PM by GRISELDA Bettencourt.    XR Forearm 2 View Right [[id number]] Collected: 06/06/23 1345     Updated: 06/06/23 1348    Narrative:      CLINICAL INDICATION:    fall     EXAMINATION TECHNIQUE:   XR FOREARM 2 VW RIGHT-     COMPARISON:  None.     FINDINGS:  No acute fracture or malalignment. No acute soft tissue abnormality. No  radiodense foreign bodies. Osseous demineralization. Degenerative  changes of the wrist joint and elbow joint.       Impression:      No acute osseous findings.           Images personally reviewed, interpreted and dictated by GRISELDA Bettencourt.                This report was signed and finalized on 6/6/2023 1:46 PM by GRISELDA Bettencourt.    XR Wrist 3+ View Right [[id number]] Collected: 06/06/23 1344     Updated: 06/06/23 1347    Narrative:      CLINICAL INDICATION:    fall     EXAMINATION TECHNIQUE:   XR WRIST 3+ VW RIGHT-     COMPARISON:  None.     FINDINGS:  No acute fracture or malalignment. Moderate to severe degenerative  changes of the radiocarpal, intercarpal, and carpometacarpal joints.  Periarticular calcifications. Chondrocalcinosis. Osseous  demineralization. Soft tissue swelling.        Impression:      Degenerative changes without acute osseous findings. Soft tissue  swelling.           Images personally reviewed, interpreted and dictated by GRISELDA Bettencourt.                This report was signed and finalized on 6/6/2023 1:45 PM by GRISELDA Bettencourt.    CT Head Without Contrast [[id number]] Collected: 06/06/23 1335     Updated: 06/06/23 1342    Narrative:      HEAD CT     6/6/2023 1:05 PM      HISTORY:   Head trauma, minor (Age >= 65y)     TECHNIQUE:   Multiple axial CT images were performed from the foramen magnum to the  vertex. Coronal reformatted images were reconstructed from axial data  set. This study was performed with techniques to keep radiation doses as  low as reasonably achievable (ALARA). Individualized dose reduction  techniques using automated exposure control or adjustment of mA and/or  kV according to the patient size were employed. 843.84 mGy.cm     COMPARISON:   None     FINDINGS:   No acute intracranial hemorrhage or large acute cortical infarct.  Chronic small vessel ischemic white matter changes and generalized  cerebral volume loss are present. Ventricles are prominent. No midline  shift. The basal cisterns are patent. Intracranial arterial  atherosclerotic calcifications.     No skull fracture. Heterogeneously increased density of the bilateral  occipital and posterior parietal bones The visualized paranasal sinuses  and mastoid air cells are clear.       Impression:      No acute intracranial hemorrhage or large acute cortical infarct.  Chronic microvascular ischemic white matter changes with global volume  loss and mild ventriculomegaly.  Heterogeneous appearance of the posterior skull, concerning for  sclerotic osseous metastasis. Correlate clinically.        CRITICAL RESULT:  No.     COMMUNICATION:  Per this written report.     Images personally reviewed, interpreted, and dictated by GRISELDA Bettencourt.                             This report was signed  and finalized on 6/6/2023 1:40 PM by GRISELDA Bettencourt.    CT Cervical Spine Without Contrast [[id number]] Collected: 06/06/23 1331     Updated: 06/06/23 1337    Narrative:      CT CERVICAL SPINE     6/6/2023 1:05 PM      HISTORY: Status post fall with neck pain.Neck trauma (Age >= 65y)     COMPARISON:  None.     PROCEDURE: Axial images were obtained from the skull base to the  thoracic inlet by computed tomography. 3 D reconstruction images were  performed. This study was performed with techniques to keep radiation  doses as low as reasonably achievable, (ALARA). Individualized dose  reduction techniques using automated exposure control or adjustment of  mA and/or kV according to the patient size were employed.        FINDINGS:   There is no acute fracture or malalignment of the cervical spine. The  facets are normally aligned. The cervical lordosis is maintained.  Multilevel degenerative changes are present with disc space loss,  endplate sclerosis, and marginal osteophytosis and associated mild to  moderate uncovertebral and facet joint hypertrophy, and disc  calcifications resulting in varying degree of mild to moderate spinal  canal and neural foramina stenosis, predominantly involving C5-C6 and  C6-C7. Moderate acute intra-axial joint degenerative changes.. No acute  paraspinal abnormality. Limited images of the lung apices are  unremarkable. There are multiple sclerotic lesions noted throughout the  cervical spine and upper thoracic spine.       Impression:      No acute fracture of the cervical spine. Moderate multilevel  degenerative changes.      Multiple sclerotic lesions throughout the spine, compatible with blastic  osseous metastasis, correlate clinically.              Images personally reviewed, interpreted and dictated by GRISELDA Bettencourt.     This report was signed and finalized on 6/6/2023 1:35 PM by GRISELDA Bettencourt.            Echo:    Results for orders placed during the hospital  encounter of 01/07/23    Adult Transthoracic Echo Complete W/ Cont if Necessary Per Protocol    Interpretation Summary    Left ventricular wall thickness is consistent with mild concentric hypertrophy.    There is calcification of the aortic valve.    Moderate tricuspid valve regurgitation is present.    Estimated right ventricular systolic pressure from tricuspid regurgitation is mildly elevated (35-45 mmHg).    Normal LV systolic function    Mild AI; no AS    Condition on Discharge:      Stable.    Code status during the hospital stay:    Code Status and Medical Interventions:   Ordered at: 06/06/23 1950     Medical Intervention Limits:    NO intubation (DNI)     Level Of Support Discussed With:    Patient     Code Status (Patient has no pulse and is not breathing):    No CPR (Do Not Attempt to Resuscitate)     Medical Interventions (Patient has pulse or is breathing):    Limited Support     Discharge Disposition:    Rehab Facility or Unit (DC - External)    Discharge Medications:       Discharge Medications        New Medications        Instructions Start Date   acetaminophen 325 MG tablet  Commonly known as: TYLENOL   650 mg, Oral, Every 4 Hours PRN      magnesium oxide 400 MG tablet  Commonly known as: MAG-OX   400 mg, Oral, Daily      predniSONE 20 MG tablet  Commonly known as: DELTASONE   20 mg, Oral, Daily   Start Date: Earnestine 10, 2023     predniSONE 10 MG tablet  Commonly known as: DELTASONE   10 mg, Oral, Daily   Start Date: June 13, 2023            Continue These Medications        Instructions Start Date   Lactaid 3000 units tablet  Generic drug: lactase   3,000 Units, Oral, 3 Times Daily With Meals      mirtazapine 15 MG tablet  Commonly known as: REMERON   15 mg, Oral, Nightly      omeprazole 20 MG capsule  Commonly known as: priLOSEC   20 mg, Oral, Daily      ondansetron 4 MG tablet  Commonly known as: Zofran   4 mg, Oral, Every 8 Hours PRN      traMADol 50 MG tablet  Commonly known as: ULTRAM   50 mg,  Oral, Every 8 Hours PRN      triamterene-hydrochlorothiazide 37.5-25 MG per tablet  Commonly known as: MAXZIDE-25   TAKE ONE TABLET BY MOUTH EVERY DAY      Xiidra 5 % ophthalmic solution  Generic drug: Lifitegrast   Xiidra 5 % eye drops in a dropperette             Stop These Medications      oxyCODONE-acetaminophen 5-325 MG per tablet  Commonly known as: PERCOCET     Oyster Shell Calcium w/D 500-5 MG-MCG tablet     prochlorperazine 10 MG tablet  Commonly known as: COMPAZINE            Discharge Diet:   As tolerated    Activity at Discharge:   As tolerated    Follow-up Appointments:     Contact information for follow-up providers       Zaida Marrero APRN Follow up in 1 week(s).    Specialties: Family Medicine, Nurse Practitioner, Family Medicine  Contact information:  [address]  [facility] [place] [facility] [address] [phone]                       Contact information for after-discharge care       Destination       [facility]    Service: Skilled Nursing  Contact information:  [address] [facility] [address] [phone]                                 Future Appointments   Date Time Provider Department Center   10/18/2023  1:30 PM Zaida Marrero APRN [facility]     Test Results Pending at Discharge:           Christina Palmer DO  06/09/23  09:32 EDT    Time: I spent 24 minutes on this discharge activity which included: face-to-face encounter with the patient, reviewing the data in the system, coordination of the care with the nursing staff as well as consultants, documentation, and entering orders.     Dictated utilizing Dragon dictation.

## 2023-06-09 NOTE — ACP (ADVANCE CARE PLANNING)
Consult received for advanced care planning.  Per conversation with palliative services on 6-7-23, pt reported having these documents.  A request made to bring a copy to the hospital.  Upon speaking with pt's granddaughter, she confirmed pt had advanced directives and pt's son brought copies to the hospital.  Upon review of pt's paper chart, Power of  paperwork is present but had not been sent to scan into EMR at this time.

## 2023-06-09 NOTE — CASE MANAGEMENT/SOCIAL WORK
Case Management Discharge Note      Final Note: Pt. will be discharging to [facility] today. Pt. daughter will provide discharge transport.     SW met with Pt. at bedside to discuss discharge. Pt. did not have any questions or concerns about discharging to [facility] today.     Selected Continued Care - Admitted Since 6/6/2023       Destination Coordination complete.      Service Provider Selected Services Address Phone Fax Patient Preferred    [facility] [address], [facility] [address] [phone] --              Durable Medical Equipment    No services have been selected for the patient.                Dialysis/Infusion    No services have been selected for the patient.                Home Medical Care    No services have been selected for the patient.                Therapy    No services have been selected for the patient.                Community Resources    No services have been selected for the patient.                Community & DME    No services have been selected for the patient.                    Transportation Services  Private: Car    Final Discharge Disposition Code: 03 - skilled nursing facility (SNF)

## 2023-06-09 NOTE — NURSING NOTE
Report given to Dee Kellogg, Nurse at [facility]. Plan is for pt to be discharged via family transport around noon.

## 2023-06-09 NOTE — TELEPHONE ENCOUNTER
Pt son brought in papers for FMLA for oncology appts, he is not a pt here, he will need to have oncology do that for him, or bring mother in for appt, left pt detailed message about this

## 2023-06-09 NOTE — PLAN OF CARE
Goal Outcome Evaluation:           Progress: no change  Outcome Evaluation: VSS. No acute changes during this shift. No distress noted at this time. Patient rested well this shift.

## 2023-06-15 ENCOUNTER — NURSING HOME (OUTPATIENT)
Dept: FAMILY MEDICINE CLINIC | Facility: CLINIC | Age: 83
End: 2023-06-15
Payer: MEDICARE

## 2023-06-15 VITALS
OXYGEN SATURATION: 96 % | HEART RATE: 64 BPM | BODY MASS INDEX: 21.62 KG/M2 | RESPIRATION RATE: 18 BRPM | DIASTOLIC BLOOD PRESSURE: 62 MMHG | WEIGHT: 118.2 LBS | TEMPERATURE: 98.3 F | SYSTOLIC BLOOD PRESSURE: 133 MMHG

## 2023-06-15 DIAGNOSIS — C80.1 BILIARY OBSTRUCTION DUE TO CANCER: ICD-10-CM

## 2023-06-15 DIAGNOSIS — C25.9 MALIGNANT NEOPLASM OF PANCREAS, UNSPECIFIED LOCATION OF MALIGNANCY: ICD-10-CM

## 2023-06-15 DIAGNOSIS — K83.1 BILIARY OBSTRUCTION DUE TO CANCER: ICD-10-CM

## 2023-06-15 DIAGNOSIS — R53.81 PHYSICAL DECONDITIONING: ICD-10-CM

## 2023-06-15 DIAGNOSIS — Z78.9 IMPAIRED MOBILITY AND ADLS: Primary | ICD-10-CM

## 2023-06-15 DIAGNOSIS — I10 PRIMARY HYPERTENSION: Chronic | ICD-10-CM

## 2023-06-15 DIAGNOSIS — Z74.09 IMPAIRED MOBILITY AND ADLS: Primary | ICD-10-CM

## 2023-06-16 PROBLEM — Z74.09 IMPAIRED MOBILITY AND ADLS: Status: ACTIVE | Noted: 2023-06-16

## 2023-06-16 PROBLEM — Z78.9 IMPAIRED MOBILITY AND ADLS: Status: ACTIVE | Noted: 2023-06-16

## 2023-06-17 NOTE — PROGRESS NOTES
Nursing Home History/Physical        Louie DO Beverly [x]   JAYDEN Cuellar []  [address], [place] [address]  Phone: [phone]  Fax: [phone] Ankit Fuller MD []  Leonidas Tran DO []  [address] [place] 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    Admission Date:  6/6/2023  Date of Discharge:  6/9/2023  Primary Care Physician:  Zaida Marrero APRN     Important issues to note:     Discharge to [facility]  F/u with palliative care  F/u with [facility]     Discharge Diagnoses:      Generalized weakness, POA  Metastatic pancreatic cancer, POA  Recent cholangitis  Asthma without exacerbation  History of gout  Impaired mobility and ADLs     Problem List:            Active Hospital Problems     Diagnosis   POA    **Pancreas malignancy [C25.9]   Yes    Moderate Malnutrition (HCC) [E44.0]   Yes    Debility [R53.81]   Yes       Resolved Hospital Problems   No resolved problems to display.      Presenting Problem:          Chief Complaint   Patient presents with    Weakness - Generalized    Fall       Family states pt fell last night, pt denies falling last night, fell Sunday. Pt has right wrist pain, swelling to the right foot. Pt states she has hx of gout. Pt recent dx of pancreatic CA with treatments starting next Tuesday. Pt was  in  for a week released a week ago. Unsure why she was in.                  Consults:      Consulting Physician(s)                          None                 Procedures Performed:           History of presenting illness/Hospital Course:     Patient is a 82-year-old female with history of metastatic pancreatic cancer followed by [facility] center, had recent hospitalization and discharge due to acute cholangeitis and was discharged on 29 May.  She has underlying history of asthma and hypertension in addition to gout.  She is retired teacher.  She presented to the emergency room with complaints of weakness, generalized pain, thought that she may have had a gout flare.     In the ER, she was hemodynamically stable and labs were overall stable.  She had imaging performed.  A CT scan of the head showed no hemorrhage or infarct, there were is chronic microvascular changes as well as heterogeneous appearance of the posterior skull concerning for sclerotic osseous metastasis.  CT scan of cervical spine with multiple sclerotic appearing lesions throughout the spine compatible with blastic osseous metastasis.  X-rays of the right wrist the right forearm with no fractures and degenerative changes.  X-ray of the right knee with no acute fracture.  X-ray of the right foot with questionable nondisplaced fracture of the great toe proximal phalangeal distal shaft or artifact noted.  Due to her significant weakness and debility we are asked to admit.     Patient was updated on her findings from her prior PET scan as well as CT scans performed this hospitalization.  Patient is willing to proceed with short-term rehab, has been approved for STR. She was started on a prednisone taper for possible gout flare and to help with bone pain. Has been working with PT/OT. Has been arranged for placement today. Palliative care will follow up with her moving forward. Can f/u with [facility] if decides on moving forward with chemo, but  patient doesn't seem to keen on idea.    Since arrival to facility, patient is tolerating p.o. intake.  She does states she has an allergy to pork, described as intolerance, and would like to discontinue meat intake with meals.    PAST MEDICAL & SURGICAL HISTORY:   Past Medical History:   Diagnosis Date    Arthritis     Asthma     Hypertension       Past Surgical History:   Procedure Laterality Date    APPENDECTOMY      BREAST SURGERY      cyst removed    ERCP N/A 1/8/2023    Procedure: ENDOSCOPIC RETROGRADE CHOLANGIOPANCREATOGRAPHY WITH STENT;  Surgeon: Darius Obrien MD;  Location: [facility] ENDOSCOPY;  Service: Gastroenterology;  Laterality: N/A;  Sphincterotomy made. Common bile duct (CBD) bushed for non-gyne cyto. Wall stent 10x 60 placed in CBD.     SKIN CANCER EXCISION      TONSILLECTOMY      TUBAL ABDOMINAL LIGATION           MEDICATIONS:  I have reviewed and reconciled the patients medication list in the patients chart at the skilled nursing facility today.      ALLERGIES:    Allergies   Allergen Reactions    Lactose Intolerance (Gi) GI Intolerance    Aspirin Hives    Procaine Hives         SOCIAL HISTORY:    Social History     Socioeconomic History    Marital status:    Tobacco Use    Smoking status: Never    Smokeless tobacco: Never   Vaping Use    Vaping Use: Never used   Substance and Sexual Activity    Alcohol use: Never    Drug use: Never    Sexual activity: Defer       FAMILY HISTORY:    Family History   Problem Relation Age of Onset    Kidney disease Mother     Asthma Mother     Kidney disease Father     Kidney disease Sister     Asthma Sister     Kidney disease Brother     Asthma Brother     Asthma Son        REVIEW OF SYSTEMS:    Review of Systems  Appetite: Fair [x]   Good []   Poor []   Weight Loss []  [x]  Weight Stable   Unavoidable Weight Loss []  Tolerating Tube Feeding []    Supplements Provided []   Constitutional: Negative for fever, chills, diaphoresis; malaise/fatigue as per  above.  HENT: No dysphagia; no changes to vision/hearing/smell/taste; no epistaxis  Eyes: Negative for redness and visual disturbance.   Respiratory: Negative for shortness of breath, chest pain, cough or chest tightness.   Cardiovascular: Negative for chest pain and palpitations.   Gastrointestinal: Negative for abdominal distention, abdominal pain and blood in stool.   Endocrine: Negative for cold intolerance and heat intolerance.   Genitourinary: Negative for difficulty urinating, dysuria and frequency.Negative for hematuria   Musculoskeletal: Chronic myalgias and arthralgias  Integumentary: No open wounds, rash or concerning skin lesions  Neurological: Negative for syncope; generalized weakness.  Hematological: Negative for adenopathy. Does not bruise/bleed easily.   Immunological: Negative for reported allergies or immunological disorders  Psychological: No acute behavioral changes    PHYSICAL EXAMINATION:   VITAL SIGNS:   Vitals:    06/15/23 1536   BP: 133/62   Pulse: 64   Resp: 18   Temp: 98.3 °F (36.8 °C)   SpO2: 96%         Physical Exam  General Appearance:  [x]  Alert   [x]  Oriented x person  [x]  No acute distress     []  Confused  []  Disoriented   []  Comatose   Head:  Atraumatic and normocephalic, without obvious abnormality.   Eyes:         PERRLA, conjunctivae and sclerae normal, no Icterus. No pallor. Extra-occular movements are within normal limits.   Ears:  Ears appear intact with no abnormalities noted.   Throat: No oral lesions, no thrush, oral mucosa moist.   Neck: Supple, trachea midline, no thyromegaly, no carotid bruit.   Back:   No kyphoscoliosis. No tenderness to palpation.   Lungs:   Chest shape is normal. Breath sounds heard bilaterally equally.  No wheezing.  Audible air exchange noted all lung fields.   Heart:  Normal S1 and S2, no murmur, no gallop, no rub. No JVD.   Abdomen:   Normal bowel sounds, no masses, no organomegaly. Soft, non-tender, non-distended, no guarding     Extremities: Moves all extremities well, edema, cyanosis or clubbing.  Frail build.    Pulses: Pulses palpable and equal bilaterally.   Skin: No bleeding or rash.  Generalized dry skin noted.  Age-related atrophy of skin.   Neurologic: [x] Normal speech []  Normal mental status    [x] Cranial nerves II through XII intact   [x]  No anosmia [x]  DTR 2+ [x]  Proprioception intact  [x]  No focal motor/sensory deficits      Psych/Mood:                    [x]  No acute changes []  Depressed      Urinary:      [x]  Continent  [x]  Incontinent, at times [x]  Retention  []  F/C     []  UTI w/treatment in progress      ASSESSMENT     Diagnoses and all orders for this visit:    1. Impaired mobility and ADLs (Primary)    2. Physical deconditioning    3. Primary hypertension    4. Malignant neoplasm of pancreas, unspecified location of malignancy    5. Biliary obstruction due to cancer        PLAN  Planned utilization of supportive care services for mobilization/transfer assistance, as well as ADLs of need.  Continue formal therapy.  Continue to monitor overall nutritional/hydration status.  No deficits reported at this time.  Will eliminate meat with meals, as she prefers vegetarian intake.  Continue to follow her overall hydration status.    Pain appears clinically well controlled at this time.    Vital signs stable, appears hemodynamically asymptomatic.  Blood pressure is at goal.    Surveillance labs per routine.        [x]  Discussed Patient in detail with nursing/staff, addressed all needs today.     [x]  Plan of Care Reviewed   [x]  PT/OT Reviewed   []  Order Changes  [x]  Discharge Plans Reviewed  []  Code Status Change        I spent 45 minutes caring for Nicole on this date of service. This time includes time spent by me in the following activities:preparing for the visit, reviewing tests, performing a medically appropriate examination and/or evaluation , counseling and educating the patient/family/caregiver, ordering  medications, tests, or procedures, documenting information in the medical record, and care coordination    Louie Paul   06/15/2023

## 2023-06-24 PROBLEM — N39.0 URINARY TRACT INFECTION WITHOUT HEMATURIA, SITE UNSPECIFIED: Status: ACTIVE | Noted: 2023-01-01

## 2023-07-22 NOTE — ED NOTES
"Called [facility] for update,  states the \"provider has not called back\".  "
Called [facility] for Dr. García at this time. Waiting for call back.   
Pt alert and oriented at time of d/c, pt refuses wheelchair, walks to ED waiting area with tech and family member. Pt daughter given complete transfer packet for [facility] transfer. Oncoming nurse aware of arrangements. End of care per this RN.   
Report to Sarah at [facility].   
decreased ability to use legs for bridging/pushing

## 2023-08-06 RX ORDER — TRIAMTERENE AND HYDROCHLOROTHIAZIDE 37.5; 25 MG/1; MG/1
TABLET ORAL
Qty: 90 TABLET | Refills: 2 | OUTPATIENT
Start: 2023-08-06

## (undated) DEVICE — GW JAG STR .035IN 450CM

## (undated) DEVICE — CONTN GRAD MEAS TRIANG 32OZ BLK

## (undated) DEVICE — CANNULATING SPHINCTEROTOME: Brand: JAGTOME™ REVOLUTION RX

## (undated) DEVICE — TRIPLE LUMEN ERCP CANNULA: Brand: TANDEM XL

## (undated) DEVICE — KT ORCA ORCAPOD DISP STRL

## (undated) DEVICE — TUBING, SUCTION, 1/4" X 10', STRAIGHT: Brand: MEDLINE

## (undated) DEVICE — THE BITE BLOCK MAXI, LATEX FREE STRAP IS USED TO PROTECT THE ENDOSCOPE INSERTION TUBE FROM BEING BITTEN BY THE PATIENT.

## (undated) DEVICE — SAFELINER SUCTION CANISTER 1000CC: Brand: DEROYAL

## (undated) DEVICE — SPNG VERSALON 4X4 4PLY NONSTRL LF BG/200

## (undated) DEVICE — BOWL UTIL STRL 32OZ

## (undated) DEVICE — SOL IRR H2O BTL 1000ML STRL

## (undated) DEVICE — ERBE NESSY®PLATE 170 SPLIT; 168CM²; CABLE 3M: Brand: ERBE

## (undated) DEVICE — SYR LUERLOK 50ML

## (undated) DEVICE — GW HIPRFM BIL NOVAGOLD STR .018IN 480CM

## (undated) DEVICE — DEV LK WIREGUIDE FUSN OLYMP SCP

## (undated) DEVICE — WIREGUIDED CYTOLOGY BRUSH: Brand: RX CYTOLOGY BRUSH

## (undated) DEVICE — HYBRID CO2 TUBING/CAP SET FOR OLYMPUS® SCOPES & CO2 SOURCE: Brand: ERBE

## (undated) DEVICE — INTRO ACCSR BLNT TP

## (undated) DEVICE — TRIPLE LUMEN SPHINCTEROTOME: Brand: ULTRATOME XL

## (undated) DEVICE — LUBE GEL ENDOGLIDE 1.1OZ

## (undated) DEVICE — SYR LL TP 10ML STRL